# Patient Record
Sex: FEMALE | Race: WHITE | NOT HISPANIC OR LATINO | URBAN - METROPOLITAN AREA
[De-identification: names, ages, dates, MRNs, and addresses within clinical notes are randomized per-mention and may not be internally consistent; named-entity substitution may affect disease eponyms.]

---

## 2017-05-01 ENCOUNTER — OFFICE (AMBULATORY)
Dept: URBAN - METROPOLITAN AREA CLINIC 64 | Facility: CLINIC | Age: 67
End: 2017-05-01
Payer: COMMERCIAL

## 2017-05-01 ENCOUNTER — HOSPITAL ENCOUNTER (OUTPATIENT)
Dept: OTHER | Facility: HOSPITAL | Age: 67
Setting detail: SPECIMEN
Discharge: HOME | End: 2017-05-01
Attending: INTERNAL MEDICINE | Admitting: INTERNAL MEDICINE

## 2017-05-01 ENCOUNTER — ON CAMPUS - OUTPATIENT (AMBULATORY)
Dept: URBAN - METROPOLITAN AREA HOSPITAL 2 | Facility: HOSPITAL | Age: 67
End: 2017-05-01
Payer: COMMERCIAL

## 2017-05-01 VITALS
HEART RATE: 81 BPM | DIASTOLIC BLOOD PRESSURE: 83 MMHG | SYSTOLIC BLOOD PRESSURE: 136 MMHG | SYSTOLIC BLOOD PRESSURE: 140 MMHG | SYSTOLIC BLOOD PRESSURE: 112 MMHG | DIASTOLIC BLOOD PRESSURE: 72 MMHG | TEMPERATURE: 97.5 F | WEIGHT: 125 LBS | SYSTOLIC BLOOD PRESSURE: 116 MMHG | DIASTOLIC BLOOD PRESSURE: 63 MMHG | RESPIRATION RATE: 16 BRPM | HEART RATE: 85 BPM | SYSTOLIC BLOOD PRESSURE: 138 MMHG | DIASTOLIC BLOOD PRESSURE: 59 MMHG | HEIGHT: 63 IN | SYSTOLIC BLOOD PRESSURE: 113 MMHG | DIASTOLIC BLOOD PRESSURE: 71 MMHG | OXYGEN SATURATION: 100 % | OXYGEN SATURATION: 97 % | HEART RATE: 84 BPM | OXYGEN SATURATION: 96 % | HEART RATE: 80 BPM | OXYGEN SATURATION: 98 % | HEART RATE: 87 BPM | HEART RATE: 79 BPM | HEART RATE: 76 BPM | HEART RATE: 82 BPM | SYSTOLIC BLOOD PRESSURE: 91 MMHG | SYSTOLIC BLOOD PRESSURE: 115 MMHG | DIASTOLIC BLOOD PRESSURE: 55 MMHG | OXYGEN SATURATION: 99 % | DIASTOLIC BLOOD PRESSURE: 53 MMHG

## 2017-05-01 DIAGNOSIS — Z12.11 ENCOUNTER FOR SCREENING FOR MALIGNANT NEOPLASM OF COLON: ICD-10-CM

## 2017-05-01 DIAGNOSIS — D12.2 BENIGN NEOPLASM OF ASCENDING COLON: ICD-10-CM

## 2017-05-01 LAB
GI HISTOLOGY: A. UNSPECIFIED: (no result)
GI HISTOLOGY: PDF REPORT: (no result)

## 2017-05-01 PROCEDURE — 88305 TISSUE EXAM BY PATHOLOGIST: CPT | Mod: 26 | Performed by: INTERNAL MEDICINE

## 2017-05-01 PROCEDURE — 45380 COLONOSCOPY AND BIOPSY: CPT | Performed by: INTERNAL MEDICINE

## 2017-05-01 RX ADMIN — PROPOFOL: 10 INJECTION, EMULSION INTRAVENOUS at 16:02

## 2017-07-20 ENCOUNTER — HOSPITAL ENCOUNTER (OUTPATIENT)
Dept: FAMILY MEDICINE CLINIC | Facility: CLINIC | Age: 67
Setting detail: SPECIMEN
Discharge: HOME | End: 2017-07-20
Attending: INTERNAL MEDICINE | Admitting: INTERNAL MEDICINE

## 2017-07-20 LAB
BACTERIA SPEC AEROBE CULT: NORMAL
Lab: NORMAL
MICRO REPORT STATUS: NORMAL
SPECIMEN SOURCE: NORMAL

## 2017-11-20 ENCOUNTER — HOSPITAL ENCOUNTER (OUTPATIENT)
Dept: FAMILY MEDICINE CLINIC | Facility: CLINIC | Age: 67
Setting detail: SPECIMEN
Discharge: HOME | End: 2017-11-20
Attending: FAMILY MEDICINE | Admitting: FAMILY MEDICINE

## 2017-11-20 LAB
ALBUMIN SERPL-MCNC: 4.2 G/DL (ref 3.5–4.8)
ALBUMIN/GLOB SERPL: 1.6 {RATIO} (ref 1–1.7)
ALP SERPL-CCNC: 40 IU/L (ref 32–91)
ALT SERPL-CCNC: 27 IU/L (ref 14–54)
ANION GAP SERPL CALC-SCNC: 9.9 MMOL/L (ref 10–20)
AST SERPL-CCNC: 25 IU/L (ref 15–41)
BASOPHILS # BLD AUTO: 0.1 10*3/UL (ref 0–0.2)
BASOPHILS NFR BLD AUTO: 1 % (ref 0–2)
BILIRUB SERPL-MCNC: 0.6 MG/DL (ref 0.3–1.2)
BILIRUB UR QL STRIP: NEGATIVE MG/DL
BUN SERPL-MCNC: 15 MG/DL (ref 8–20)
BUN/CREAT SERPL: 25 (ref 5.4–26.2)
CALCIUM SERPL-MCNC: 9.2 MG/DL (ref 8.9–10.3)
CASTS URNS QL MICRO: ABNORMAL /[LPF]
CHLORIDE SERPL-SCNC: 101 MMOL/L (ref 101–111)
CHOLEST SERPL-MCNC: 158 MG/DL
CHOLEST/HDLC SERPL: 2.7 {RATIO}
COLOR UR: YELLOW
CONV BACTERIA IN URINE MICRO: NEGATIVE
CONV CLARITY OF URINE: CLEAR
CONV CO2: 28 MMOL/L (ref 22–32)
CONV HYALINE CASTS IN URINE MICRO: 0 /[LPF] (ref 0–5)
CONV LDL CHOLESTEROL DIRECT: 85 MG/DL (ref 0–100)
CONV PROTEIN IN URINE BY AUTOMATED TEST STRIP: NEGATIVE MG/DL
CONV SMALL ROUND CELLS: ABNORMAL /[HPF]
CONV TOTAL PROTEIN: 6.8 G/DL (ref 6.1–7.9)
CONV UROBILINOGEN IN URINE BY AUTOMATED TEST STRIP: 0.2 MG/DL
CREAT UR-MCNC: 0.6 MG/DL (ref 0.4–1)
CULTURE INDICATED?: ABNORMAL
DIFFERENTIAL METHOD BLD: (no result)
EOSINOPHIL # BLD AUTO: 0.2 10*3/UL (ref 0–0.3)
EOSINOPHIL # BLD AUTO: 3 % (ref 0–3)
ERYTHROCYTE [DISTWIDTH] IN BLOOD BY AUTOMATED COUNT: 14.4 % (ref 11.5–14.5)
GLOBULIN UR ELPH-MCNC: 2.6 G/DL (ref 2.5–3.8)
GLUCOSE SERPL-MCNC: 130 MG/DL (ref 65–99)
GLUCOSE UR QL: NEGATIVE MG/DL
HCT VFR BLD AUTO: 38 % (ref 35–49)
HDLC SERPL-MCNC: 59 MG/DL
HGB BLD-MCNC: 12.4 G/DL (ref 12–15)
HGB UR QL STRIP: NEGATIVE
KETONES UR QL STRIP: NEGATIVE MG/DL
LDLC/HDLC SERPL: 1.4 {RATIO}
LEUKOCYTE ESTERASE UR QL STRIP: ABNORMAL
LIPID INTERPRETATION: NORMAL
LYMPHOCYTES # BLD AUTO: 2.8 10*3/UL (ref 0.8–4.8)
LYMPHOCYTES NFR BLD AUTO: 37 % (ref 18–42)
MCH RBC QN AUTO: 28.1 PG (ref 26–32)
MCHC RBC AUTO-ENTMCNC: 32.6 G/DL (ref 32–36)
MCV RBC AUTO: 86.1 FL (ref 80–94)
MONOCYTES # BLD AUTO: 0.7 10*3/UL (ref 0.1–1.3)
MONOCYTES NFR BLD AUTO: 10 % (ref 2–11)
NEUTROPHILS # BLD AUTO: 3.6 10*3/UL (ref 2.3–8.6)
NEUTROPHILS NFR BLD AUTO: 49 % (ref 50–75)
NITRITE UR QL STRIP: NEGATIVE
NRBC BLD AUTO-RTO: 0 /100{WBCS}
NRBC/RBC NFR BLD MANUAL: 0 10*3/UL
PH UR STRIP.AUTO: 6.5 [PH] (ref 4.5–8)
PLATELET # BLD AUTO: 291 10*3/UL (ref 150–450)
PMV BLD AUTO: 9 FL (ref 7.4–10.4)
POTASSIUM SERPL-SCNC: 3.9 MMOL/L (ref 3.6–5.1)
RBC # BLD AUTO: 4.42 10*6/UL (ref 4–5.4)
RBC #/AREA URNS HPF: 2 /[HPF] (ref 0–3)
SODIUM SERPL-SCNC: 135 MMOL/L (ref 136–144)
SP GR UR: 1.01 (ref 1–1.03)
SPERM URNS QL MICRO: ABNORMAL /[HPF]
SQUAMOUS SPT QL MICRO: 0 /[HPF] (ref 0–5)
T4 FREE SERPL-MCNC: 1.07 NG/DL (ref 0.58–1.64)
TRIGL SERPL-MCNC: 115 MG/DL
TSH SERPL-ACNC: 1.52 UIU/ML (ref 0.34–5.6)
UNIDENT CRYS URNS QL MICRO: ABNORMAL /[HPF]
VIT B12 SERPL-MCNC: 135 PG/ML (ref 180–914)
VLDLC SERPL CALC-MCNC: 13.3 MG/DL
WBC # BLD AUTO: 7.4 10*3/UL (ref 4.5–11.5)
WBC #/AREA URNS HPF: 1 /[HPF] (ref 0–5)
YEAST SPEC QL WET PREP: ABNORMAL /[HPF]

## 2018-03-19 ENCOUNTER — HOSPITAL ENCOUNTER (OUTPATIENT)
Dept: FAMILY MEDICINE CLINIC | Facility: CLINIC | Age: 68
Setting detail: SPECIMEN
Discharge: HOME | End: 2018-03-19
Attending: FAMILY MEDICINE | Admitting: FAMILY MEDICINE

## 2018-08-07 ENCOUNTER — HOSPITAL ENCOUNTER (OUTPATIENT)
Dept: FAMILY MEDICINE CLINIC | Facility: CLINIC | Age: 68
Setting detail: SPECIMEN
Discharge: HOME | End: 2018-08-07
Attending: FAMILY MEDICINE | Admitting: FAMILY MEDICINE

## 2018-08-07 LAB
BASOPHILS # BLD AUTO: 0 10*3/UL (ref 0–0.2)
BASOPHILS NFR BLD AUTO: 0 % (ref 0–2)
BILIRUB UR QL STRIP: NEGATIVE MG/DL
CASTS URNS QL MICRO: ABNORMAL /[LPF]
COLOR UR: YELLOW
CONV BACTERIA IN URINE MICRO: NEGATIVE
CONV CLARITY OF URINE: CLEAR
CONV HYALINE CASTS IN URINE MICRO: 0 /[LPF] (ref 0–5)
CONV PROTEIN IN URINE BY AUTOMATED TEST STRIP: NEGATIVE MG/DL
CONV SMALL ROUND CELLS: ABNORMAL /[HPF]
CONV UROBILINOGEN IN URINE BY AUTOMATED TEST STRIP: 0.2 MG/DL
CULTURE INDICATED?: ABNORMAL
DIFFERENTIAL METHOD BLD: (no result)
EOSINOPHIL # BLD AUTO: 0.1 10*3/UL (ref 0–0.3)
EOSINOPHIL # BLD AUTO: 1 % (ref 0–3)
ERYTHROCYTE [DISTWIDTH] IN BLOOD BY AUTOMATED COUNT: 14.3 % (ref 11.5–14.5)
GLUCOSE UR QL: >1000 MG/DL
HCT VFR BLD AUTO: 43.7 % (ref 35–49)
HGB BLD-MCNC: 14.1 G/DL (ref 12–15)
HGB UR QL STRIP: NEGATIVE
KETONES UR QL STRIP: NEGATIVE MG/DL
LEUKOCYTE ESTERASE UR QL STRIP: NEGATIVE
LYMPHOCYTES # BLD AUTO: 2.8 10*3/UL (ref 0.8–4.8)
LYMPHOCYTES NFR BLD AUTO: 34 % (ref 18–42)
MCH RBC QN AUTO: 27.5 PG (ref 26–32)
MCHC RBC AUTO-ENTMCNC: 32.2 G/DL (ref 32–36)
MCV RBC AUTO: 85.4 FL (ref 80–94)
MONOCYTES # BLD AUTO: 0.8 10*3/UL (ref 0.1–1.3)
MONOCYTES NFR BLD AUTO: 10 % (ref 2–11)
NEUTROPHILS # BLD AUTO: 4.6 10*3/UL (ref 2.3–8.6)
NEUTROPHILS NFR BLD AUTO: 55 % (ref 50–75)
NITRITE UR QL STRIP: NEGATIVE
NRBC BLD AUTO-RTO: 0 /100{WBCS}
NRBC/RBC NFR BLD MANUAL: 0 10*3/UL
PH UR STRIP.AUTO: 6 [PH] (ref 4.5–8)
PLATELET # BLD AUTO: 346 10*3/UL (ref 150–450)
PMV BLD AUTO: 9.2 FL (ref 7.4–10.4)
RBC # BLD AUTO: 5.11 10*6/UL (ref 4–5.4)
RBC #/AREA URNS HPF: 2 /[HPF] (ref 0–3)
SP GR UR: 1.03 (ref 1–1.03)
SPERM URNS QL MICRO: ABNORMAL /[HPF]
SQUAMOUS SPT QL MICRO: 1 /[HPF] (ref 0–5)
UNIDENT CRYS URNS QL MICRO: ABNORMAL /[HPF]
WBC # BLD AUTO: 8.4 10*3/UL (ref 4.5–11.5)
WBC #/AREA URNS HPF: 2 /[HPF] (ref 0–5)
YEAST SPEC QL WET PREP: ABNORMAL /[HPF]

## 2018-08-09 ENCOUNTER — HOSPITAL ENCOUNTER (OUTPATIENT)
Dept: CT IMAGING | Facility: HOSPITAL | Age: 68
Discharge: HOME | End: 2018-08-09
Attending: FAMILY MEDICINE | Admitting: FAMILY MEDICINE

## 2018-08-09 LAB — CREAT BLDA-MCNC: 0.7 MG/DL (ref 0.6–1.3)

## 2018-09-18 ENCOUNTER — HOSPITAL ENCOUNTER (OUTPATIENT)
Dept: FAMILY MEDICINE CLINIC | Facility: CLINIC | Age: 68
Setting detail: SPECIMEN
Discharge: HOME | End: 2018-09-18
Attending: FAMILY MEDICINE | Admitting: FAMILY MEDICINE

## 2019-01-14 ENCOUNTER — HOSPITAL ENCOUNTER (OUTPATIENT)
Dept: FAMILY MEDICINE CLINIC | Facility: CLINIC | Age: 69
Setting detail: SPECIMEN
Discharge: HOME | End: 2019-01-14
Attending: FAMILY MEDICINE | Admitting: FAMILY MEDICINE

## 2019-01-14 LAB
ALBUMIN SERPL-MCNC: 3.8 G/DL (ref 3.5–4.8)
ALBUMIN/GLOB SERPL: 1.4 {RATIO} (ref 1–1.7)
ALP SERPL-CCNC: 37 IU/L (ref 32–91)
ALT SERPL-CCNC: 16 IU/L (ref 14–54)
ANION GAP SERPL CALC-SCNC: 13.9 MMOL/L (ref 10–20)
AST SERPL-CCNC: 23 IU/L (ref 15–41)
BASOPHILS # BLD AUTO: 0.1 10*3/UL (ref 0–0.2)
BASOPHILS NFR BLD AUTO: 1 % (ref 0–2)
BILIRUB SERPL-MCNC: 0.6 MG/DL (ref 0.3–1.2)
BILIRUB UR QL STRIP: NEGATIVE MG/DL
BUN SERPL-MCNC: 15 MG/DL (ref 8–20)
BUN/CREAT SERPL: 21.4 (ref 5.4–26.2)
CALCIUM SERPL-MCNC: 8.6 MG/DL (ref 8.9–10.3)
CASTS URNS QL MICRO: ABNORMAL /[LPF]
CHLORIDE SERPL-SCNC: 102 MMOL/L (ref 101–111)
CHOLEST SERPL-MCNC: 163 MG/DL
CHOLEST/HDLC SERPL: 2.5 {RATIO}
COLOR UR: YELLOW
CONV BACTERIA IN URINE MICRO: NEGATIVE
CONV CLARITY OF URINE: ABNORMAL
CONV CO2: 25 MMOL/L (ref 22–32)
CONV HYALINE CASTS IN URINE MICRO: 2 /[LPF] (ref 0–5)
CONV LDL CHOLESTEROL DIRECT: 85 MG/DL (ref 0–100)
CONV PROTEIN IN URINE BY AUTOMATED TEST STRIP: NEGATIVE MG/DL
CONV SMALL ROUND CELLS: ABNORMAL /[HPF]
CONV TOTAL PROTEIN: 6.6 G/DL (ref 6.1–7.9)
CONV UROBILINOGEN IN URINE BY AUTOMATED TEST STRIP: 0.2 MG/DL
CREAT UR-MCNC: 0.7 MG/DL (ref 0.4–1)
CULTURE INDICATED?: ABNORMAL
DIFFERENTIAL METHOD BLD: (no result)
EOSINOPHIL # BLD AUTO: 0.1 10*3/UL (ref 0–0.3)
EOSINOPHIL # BLD AUTO: 2 % (ref 0–3)
ERYTHROCYTE [DISTWIDTH] IN BLOOD BY AUTOMATED COUNT: 15.2 % (ref 11.5–14.5)
GLOBULIN UR ELPH-MCNC: 2.8 G/DL (ref 2.5–3.8)
GLUCOSE SERPL-MCNC: 111 MG/DL (ref 65–99)
GLUCOSE UR QL: >1000 MG/DL
HCT VFR BLD AUTO: 39.9 % (ref 35–49)
HDLC SERPL-MCNC: 66 MG/DL
HGB BLD-MCNC: 12.8 G/DL (ref 12–15)
HGB UR QL STRIP: ABNORMAL
KETONES UR QL STRIP: ABNORMAL MG/DL
LDLC/HDLC SERPL: 1.3 {RATIO}
LEUKOCYTE ESTERASE UR QL STRIP: NEGATIVE
LIPID INTERPRETATION: NORMAL
LYMPHOCYTES # BLD AUTO: 2.5 10*3/UL (ref 0.8–4.8)
LYMPHOCYTES NFR BLD AUTO: 36 % (ref 18–42)
MCH RBC QN AUTO: 28.1 PG (ref 26–32)
MCHC RBC AUTO-ENTMCNC: 32.1 G/DL (ref 32–36)
MCV RBC AUTO: 87.4 FL (ref 80–94)
MONOCYTES # BLD AUTO: 0.6 10*3/UL (ref 0.1–1.3)
MONOCYTES NFR BLD AUTO: 9 % (ref 2–11)
NEUTROPHILS # BLD AUTO: 3.8 10*3/UL (ref 2.3–8.6)
NEUTROPHILS NFR BLD AUTO: 52 % (ref 50–75)
NITRITE UR QL STRIP: NEGATIVE
NRBC BLD AUTO-RTO: 0 /100{WBCS}
NRBC/RBC NFR BLD MANUAL: 0 10*3/UL
PH UR STRIP.AUTO: 5.5 [PH] (ref 4.5–8)
PLATELET # BLD AUTO: 329 10*3/UL (ref 150–450)
PMV BLD AUTO: 9.1 FL (ref 7.4–10.4)
POTASSIUM SERPL-SCNC: 3.9 MMOL/L (ref 3.6–5.1)
RBC # BLD AUTO: 4.57 10*6/UL (ref 4–5.4)
RBC #/AREA URNS HPF: 4 /[HPF] (ref 0–3)
SODIUM SERPL-SCNC: 137 MMOL/L (ref 136–144)
SP GR UR: 1.03 (ref 1–1.03)
SPERM URNS QL MICRO: ABNORMAL /[HPF]
SQUAMOUS SPT QL MICRO: 1 /[HPF] (ref 0–5)
TRIGL SERPL-MCNC: 92 MG/DL
UNIDENT CRYS URNS QL MICRO: ABNORMAL /[HPF]
VLDLC SERPL CALC-MCNC: 12.4 MG/DL
WBC # BLD AUTO: 7.1 10*3/UL (ref 4.5–11.5)
WBC #/AREA URNS HPF: 1 /[HPF] (ref 0–5)
YEAST SPEC QL WET PREP: ABNORMAL /[HPF]

## 2019-06-20 ENCOUNTER — TELEPHONE (OUTPATIENT)
Dept: FAMILY MEDICINE CLINIC | Facility: CLINIC | Age: 69
End: 2019-06-20

## 2019-06-28 RX ORDER — BLOOD SUGAR DIAGNOSTIC, DRUM
STRIP MISCELLANEOUS
Qty: 200 EACH | Refills: 2 | Status: SHIPPED | OUTPATIENT
Start: 2019-06-28 | End: 2020-04-23 | Stop reason: RX

## 2019-07-11 DIAGNOSIS — E11.8 TYPE 2 DIABETES MELLITUS WITH COMPLICATION, UNSPECIFIED WHETHER LONG TERM INSULIN USE: Primary | ICD-10-CM

## 2019-07-15 ENCOUNTER — LAB (OUTPATIENT)
Dept: FAMILY MEDICINE CLINIC | Facility: CLINIC | Age: 69
End: 2019-07-15

## 2019-07-15 DIAGNOSIS — E11.8 TYPE 2 DIABETES MELLITUS WITH COMPLICATION, UNSPECIFIED WHETHER LONG TERM INSULIN USE: ICD-10-CM

## 2019-07-15 LAB
ALBUMIN SERPL-MCNC: 4.1 G/DL (ref 3.5–4.8)
ALBUMIN/GLOB SERPL: 1.4 G/DL (ref 1–1.7)
ALP SERPL-CCNC: 35 U/L (ref 32–91)
ALT SERPL W P-5'-P-CCNC: 16 U/L (ref 14–54)
ANION GAP SERPL CALCULATED.3IONS-SCNC: 17.5 MMOL/L (ref 5–15)
AST SERPL-CCNC: 21 U/L (ref 15–41)
BILIRUB SERPL-MCNC: 0.5 MG/DL (ref 0.3–1.2)
BUN BLD-MCNC: 15 MG/DL (ref 8–20)
BUN/CREAT SERPL: 21.4 (ref 5.4–26.2)
CALCIUM SPEC-SCNC: 9.3 MG/DL (ref 8.9–10.3)
CHLORIDE SERPL-SCNC: 100 MMOL/L (ref 101–111)
CO2 SERPL-SCNC: 24 MMOL/L (ref 22–32)
CREAT BLD-MCNC: 0.7 MG/DL (ref 0.4–1)
GFR SERPL CREATININE-BSD FRML MDRD: 83 ML/MIN/1.73
GLOBULIN UR ELPH-MCNC: 2.9 GM/DL (ref 2.5–3.8)
GLUCOSE BLD-MCNC: 94 MG/DL (ref 65–99)
HBA1C MFR BLD: 7.3 % (ref 3.5–5.6)
POTASSIUM BLD-SCNC: 4.5 MMOL/L (ref 3.6–5.1)
PROT SERPL-MCNC: 7 G/DL (ref 6.1–7.9)
SODIUM BLD-SCNC: 137 MMOL/L (ref 136–144)

## 2019-07-15 PROCEDURE — 83036 HEMOGLOBIN GLYCOSYLATED A1C: CPT | Performed by: FAMILY MEDICINE

## 2019-07-15 PROCEDURE — 80053 COMPREHEN METABOLIC PANEL: CPT | Performed by: FAMILY MEDICINE

## 2019-07-17 ENCOUNTER — TELEPHONE (OUTPATIENT)
Dept: FAMILY MEDICINE CLINIC | Facility: CLINIC | Age: 69
End: 2019-07-17

## 2019-07-22 ENCOUNTER — OFFICE VISIT (OUTPATIENT)
Dept: FAMILY MEDICINE CLINIC | Facility: CLINIC | Age: 69
End: 2019-07-22

## 2019-07-22 VITALS
TEMPERATURE: 98.3 F | DIASTOLIC BLOOD PRESSURE: 78 MMHG | BODY MASS INDEX: 21.35 KG/M2 | SYSTOLIC BLOOD PRESSURE: 122 MMHG | RESPIRATION RATE: 16 BRPM | HEIGHT: 62 IN | HEART RATE: 80 BPM | WEIGHT: 116 LBS

## 2019-07-22 DIAGNOSIS — E11.9 TYPE 2 DIABETES MELLITUS WITHOUT COMPLICATION, WITHOUT LONG-TERM CURRENT USE OF INSULIN (HCC): Primary | ICD-10-CM

## 2019-07-22 DIAGNOSIS — J30.89 ENVIRONMENTAL AND SEASONAL ALLERGIES: ICD-10-CM

## 2019-07-22 PROBLEM — E53.9 VITAMIN B DEFICIENCY: Status: ACTIVE | Noted: 2017-11-27

## 2019-07-22 PROBLEM — M19.90 OSTEOARTHRITIS: Status: ACTIVE | Noted: 2019-07-22

## 2019-07-22 PROCEDURE — 99213 OFFICE O/P EST LOW 20 MIN: CPT | Performed by: FAMILY MEDICINE

## 2019-07-22 RX ORDER — GLIPIZIDE 10 MG/1
TABLET ORAL
COMMUNITY
Start: 2019-06-09 | End: 2019-11-29 | Stop reason: SDUPTHER

## 2019-07-22 RX ORDER — LORATADINE AND PSEUDOEPHEDRINE SULFATE 5; 120 MG/1; MG/1
1 TABLET, EXTENDED RELEASE ORAL 2 TIMES DAILY
COMMUNITY
End: 2019-07-22 | Stop reason: SDUPTHER

## 2019-07-22 RX ORDER — AZELASTINE HYDROCHLORIDE 0.5 MG/ML
SOLUTION/ DROPS OPHTHALMIC
COMMUNITY
Start: 2019-07-03 | End: 2023-02-23

## 2019-07-22 RX ORDER — PRAVASTATIN SODIUM 20 MG
TABLET ORAL
COMMUNITY
Start: 2019-06-09 | End: 2019-11-29 | Stop reason: SDUPTHER

## 2019-07-22 RX ORDER — DAPAGLIFLOZIN 5 MG/1
TABLET, FILM COATED ORAL
COMMUNITY
Start: 2019-06-09 | End: 2020-12-08 | Stop reason: SDUPTHER

## 2019-07-22 RX ORDER — LORATADINE AND PSEUDOEPHEDRINE SULFATE 5; 120 MG/1; MG/1
1 TABLET, EXTENDED RELEASE ORAL 2 TIMES DAILY
Qty: 60 TABLET | Refills: 3 | Status: SHIPPED | OUTPATIENT
Start: 2019-07-22 | End: 2020-12-08

## 2019-07-22 RX ORDER — LISINOPRIL 10 MG/1
TABLET ORAL
COMMUNITY
Start: 2019-05-06 | End: 2019-11-02 | Stop reason: SDUPTHER

## 2019-07-22 NOTE — ASSESSMENT & PLAN NOTE
Diabetes is improving with treatment.   Continue current treatment regimen.  Reminded to bring in blood sugar diary at next visit.  Dietary recommendations for ADA diet.  Regular aerobic exercise.  Discussed ways to avoid symptomatic hypoglycemia.  Diabetes will be reassessed in 6 months.      Overall the patient is doing reasonably well.  Her A1c is close to 7% 9 think for her age and her situation we should probably except that is reasonable.  For her to go lower would take insulin and then I think we would have the risk of hypoglycemia.  She stays very active and if we overtreated with insulin she would almost certainly get too low at times.  Because she is up and active and works a full-time job I think we should except 7% as a reasonable goal for her diabetes control.  She feels well and has really no evidence of endorgan injury.

## 2019-07-22 NOTE — PROGRESS NOTES
Rooming Tab(CC,VS,Pt Hx,Fall Screen)  Chief Complaint   Patient presents with   • Hyperlipidemia   • Hypertension   • Diabetes     6 mo f/u       Subjective    Patient is here for checkup on diabetes.   She is on Farxiga, glipizide, victoza and metformin.      A1c is 7.3%  She is really doing all she can as far as diet and exercise.  She feels fine.  She is active and her weight is stable.  She does the best she can with her diet and she is staying very busy with work.  I think we can except an A1c around 7% for her and not pusher to lower levels.  It would take insulin to do that and I just do not think she wants to do that right now    We also discussed her allergies.  She is having some seasonal flareups right now because of the weather.  Over-the-counter Claritin does seem to control her most of the time though and she really is not interested in further evaluation or treatment.  She is not having any difficulty at night resting.    I have reviewed and updated her medications, medical history and problem list during today's office visit.     Patient Care Team:  Donnie Ng MD as PCP - General  oDnnie Ng MD as PCP - Claims Attributed    Problem List Tab  Medications Tab  Synopsis Tab  Chart Review Tab  Care Everywhere Tab  Immunizations Tab  Patient History Tab    Social History     Tobacco Use   • Smoking status: Never Smoker   Substance Use Topics   • Alcohol use: No     Frequency: Never       Review of Systems   Constitutional: Negative.  Negative for appetite change, diaphoresis, fatigue, fever and unexpected weight loss.   HENT: Negative for congestion, sinus pressure and sore throat.    Eyes: Negative for blurred vision, double vision and itching.   Respiratory: Negative for cough and shortness of breath.    Cardiovascular: Negative for chest pain and palpitations.   Genitourinary: Negative for urinary incontinence, difficulty urinating and frequency.   Musculoskeletal: Negative for  "arthralgias, back pain, joint swelling, myalgias and neck pain.   Skin: Negative for dry skin and rash.   Allergic/Immunologic: Negative for environmental allergies.   Neurological: Negative for dizziness, tremors, syncope, headache and memory problem.   Hematological: Does not bruise/bleed easily.   Psychiatric/Behavioral: Negative for depressed mood. The patient is not nervous/anxious.    All other systems reviewed and are negative.      Objective     Rooming Tab(CC,VS,Pt Hx,Fall Screen)  /78 (BP Location: Left arm, Cuff Size: Adult)   Pulse 80   Temp 98.3 °F (36.8 °C) (Oral)   Resp 16   Ht 157.5 cm (62\")   Wt 52.6 kg (116 lb)   BMI 21.22 kg/m²     Body mass index is 21.22 kg/m².    Physical Exam   Constitutional: She is oriented to person, place, and time. She appears well-developed and well-nourished.   HENT:   Head: Normocephalic and atraumatic.   Right Ear: External ear normal.   Left Ear: External ear normal.   Nose: Nose normal.   Mouth/Throat: Oropharynx is clear and moist.   Eyes: Conjunctivae and EOM are normal. Pupils are equal, round, and reactive to light.   Neck: Normal range of motion. Neck supple.   Cardiovascular: Normal rate, regular rhythm, normal heart sounds and intact distal pulses.   Pulmonary/Chest: Effort normal and breath sounds normal.   Abdominal: Soft. Bowel sounds are normal.   Musculoskeletal: Normal range of motion.   Neurological: She is alert and oriented to person, place, and time.   Skin: Skin is warm and dry.   Psychiatric: She has a normal mood and affect. Her behavior is normal. Judgment and thought content normal.   Nursing note and vitals reviewed.       Statin Choice Calculator  Data Reviewed:               Lab Results   Component Value Date    BUN 15 07/15/2019    CREATININE 0.70 07/15/2019    EGFRIFNONA 83 07/15/2019     07/15/2019    K 4.5 07/15/2019     (L) 07/15/2019    CALCIUM 9.3 07/15/2019    ALBUMIN 4.10 07/15/2019    BILITOT 0.5 07/15/2019 "    ALKPHOS 35 07/15/2019    AST 21 07/15/2019    ALT 16 07/15/2019      Assessment/Plan   Order Review Tab  Health Maintenance Tab  Patient Plan/Order Tab  Diagnoses and all orders for this visit:    1. Type 2 diabetes mellitus without complication, without long-term current use of insulin (CMS/[facility]) (Primary)  Assessment & Plan:  Diabetes is improving with treatment.   Continue current treatment regimen.  Reminded to bring in blood sugar diary at next visit.  Dietary recommendations for ADA diet.  Regular aerobic exercise.  Discussed ways to avoid symptomatic hypoglycemia.  Diabetes will be reassessed in 6 months.      Overall the patient is doing reasonably well.  Her A1c is close to 7% 9 think for her age and her situation we should probably except that is reasonable.  For her to go lower would take insulin and then I think we would have the risk of hypoglycemia.  She stays very active and if we overtreated with insulin she would almost certainly get too low at times.  Because she is up and active and works a full-time job I think we should except 7% as a reasonable goal for her diabetes control.  She feels well and has really no evidence of endorgan injury.        2. Environmental and seasonal allergies  Assessment & Plan:  She does have seasonal allergies but they seem to be reasonably well controlled with over-the-counter medications.  She has no interest in being tested or desensitized.  Her symptoms are not that bad.  She will  Just  continue over-the-counter medicines      Other orders  -     CLARITIN-D 12 HOUR 5-120 MG per 12 hr tablet; Take 1 tablet by mouth 2 (Two) Times a Day.  Dispense: 60 tablet; Refill: 3      Wrapup Tab  Return in about 4 months (around 11/22/2019) for Medicare Wellness.

## 2019-07-23 PROBLEM — J30.89 ENVIRONMENTAL AND SEASONAL ALLERGIES: Status: ACTIVE | Noted: 2019-07-23

## 2019-07-23 NOTE — ASSESSMENT & PLAN NOTE
She does have seasonal allergies but they seem to be reasonably well controlled with over-the-counter medications.  She has no interest in being tested or desensitized.  Her symptoms are not that bad.  She will  Just  continue over-the-counter medicines

## 2019-08-15 ENCOUNTER — OFFICE VISIT (OUTPATIENT)
Dept: FAMILY MEDICINE CLINIC | Facility: CLINIC | Age: 69
End: 2019-08-15

## 2019-08-15 VITALS
HEIGHT: 62 IN | HEART RATE: 80 BPM | WEIGHT: 115 LBS | RESPIRATION RATE: 16 BRPM | SYSTOLIC BLOOD PRESSURE: 118 MMHG | BODY MASS INDEX: 21.16 KG/M2 | TEMPERATURE: 98.2 F | DIASTOLIC BLOOD PRESSURE: 70 MMHG

## 2019-08-15 DIAGNOSIS — J02.9 ACUTE PHARYNGITIS, UNSPECIFIED ETIOLOGY: Primary | ICD-10-CM

## 2019-08-15 LAB
EXPIRATION DATE: NORMAL
INTERNAL CONTROL: NORMAL
Lab: NORMAL
S PYO AG THROAT QL: NEGATIVE

## 2019-08-15 PROCEDURE — 99213 OFFICE O/P EST LOW 20 MIN: CPT | Performed by: NURSE PRACTITIONER

## 2019-08-15 PROCEDURE — 87880 STREP A ASSAY W/OPTIC: CPT | Performed by: NURSE PRACTITIONER

## 2019-08-15 NOTE — PROGRESS NOTES
"Subjective   Janna Flowers is a 69 y.o. female.     Chief Complaint   Patient presents with   • Sore Throat       /70 (BP Location: Right arm, Patient Position: Sitting, Cuff Size: Adult)   Pulse 80   Temp 98.2 °F (36.8 °C) (Oral)   Resp 16   Ht 157.5 cm (62\")   Wt 52.2 kg (115 lb)   BMI 21.03 kg/m²     BP Readings from Last 3 Encounters:   08/15/19 118/70   07/22/19 122/78   03/14/19 132/64       Wt Readings from Last 3 Encounters:   08/15/19 52.2 kg (115 lb)   07/22/19 52.6 kg (116 lb)   03/14/19 (!) 508 kg (1118 lb 15.8 oz)       Pt comes in today with c/o sore throat that started on Monday. Woke up with scratchy throat, sneezing, drainage, and cough.   No fever or chills.   Cough is productive at times, but not necessary new. Struggles with allergies.   Taking claritin D and nasocort daily.   Not taking anything else OTC.   Throat is a little better today.   Had some pain last night when swallowing.          The following portions of the patient's history were reviewed and updated as appropriate: allergies, current medications, past family history, past medical history, past social history, past surgical history and problem list.    Review of Systems   Constitutional: Negative for chills and fever.   HENT: Positive for congestion, postnasal drip, rhinorrhea, sinus pressure, sneezing and sore throat. Negative for ear pain and swollen glands.    Respiratory: Positive for cough. Negative for chest tightness, shortness of breath and wheezing.    Gastrointestinal: Negative for nausea and vomiting.   Neurological: Positive for headache.       Objective   Physical Exam   Constitutional: She is oriented to person, place, and time. She appears well-developed and well-nourished.   HENT:   Nose: Mucosal edema present.   Mouth/Throat: Posterior oropharyngeal erythema present. No oropharyngeal exudate.   Eyes: Pupils are equal, round, and reactive to light.   Cardiovascular: Normal rate and regular rhythm. "   Pulmonary/Chest: Effort normal and breath sounds normal. No respiratory distress. She has no wheezes.   Neurological: She is alert and oriented to person, place, and time.          Diagnoses and all orders for this visit:    1. Acute pharyngitis, unspecified etiology (Primary)  -     POCT rapid strep A        Return if symptoms worsen or fail to improve.   During this office visit, we discussed the pertinent aspects of the visit and treatment recommendations. Pt verbalizes understanding. Follow up was discussed. Patient was given the opportunity to ask questions and discuss other concerns.

## 2019-11-04 RX ORDER — LISINOPRIL 10 MG/1
TABLET ORAL
Qty: 90 TABLET | Refills: 1 | Status: SHIPPED | OUTPATIENT
Start: 2019-11-04 | End: 2020-05-04

## 2019-11-14 RX ORDER — LIRAGLUTIDE 6 MG/ML
INJECTION SUBCUTANEOUS
Qty: 1 PEN | Refills: 2 | Status: SHIPPED | OUTPATIENT
Start: 2019-11-14 | End: 2020-02-19 | Stop reason: SDUPTHER

## 2019-11-15 DIAGNOSIS — M19.90 OSTEOARTHRITIS, UNSPECIFIED OSTEOARTHRITIS TYPE, UNSPECIFIED SITE: ICD-10-CM

## 2019-11-15 DIAGNOSIS — I10 ESSENTIAL HYPERTENSION: ICD-10-CM

## 2019-11-15 DIAGNOSIS — E78.5 HYPERLIPIDEMIA, UNSPECIFIED HYPERLIPIDEMIA TYPE: ICD-10-CM

## 2019-11-15 DIAGNOSIS — E53.9 VITAMIN B DEFICIENCY: ICD-10-CM

## 2019-11-15 DIAGNOSIS — M85.80 OTHER SPECIFIED DISORDERS OF BONE DENSITY AND STRUCTURE, UNSPECIFIED SITE: ICD-10-CM

## 2019-11-15 DIAGNOSIS — E11.9 TYPE 2 DIABETES MELLITUS WITHOUT COMPLICATION, WITHOUT LONG-TERM CURRENT USE OF INSULIN (HCC): Primary | ICD-10-CM

## 2019-11-18 ENCOUNTER — LAB (OUTPATIENT)
Dept: FAMILY MEDICINE CLINIC | Facility: CLINIC | Age: 69
End: 2019-11-18

## 2019-11-18 DIAGNOSIS — E53.9 VITAMIN B DEFICIENCY: ICD-10-CM

## 2019-11-18 DIAGNOSIS — E78.5 HYPERLIPIDEMIA, UNSPECIFIED HYPERLIPIDEMIA TYPE: ICD-10-CM

## 2019-11-18 DIAGNOSIS — M85.80 OTHER SPECIFIED DISORDERS OF BONE DENSITY AND STRUCTURE, UNSPECIFIED SITE: ICD-10-CM

## 2019-11-18 DIAGNOSIS — M19.90 OSTEOARTHRITIS, UNSPECIFIED OSTEOARTHRITIS TYPE, UNSPECIFIED SITE: ICD-10-CM

## 2019-11-18 DIAGNOSIS — E11.9 TYPE 2 DIABETES MELLITUS WITHOUT COMPLICATION, WITHOUT LONG-TERM CURRENT USE OF INSULIN (HCC): ICD-10-CM

## 2019-11-18 DIAGNOSIS — I10 ESSENTIAL HYPERTENSION: ICD-10-CM

## 2019-11-18 LAB
25(OH)D3 SERPL-MCNC: 40.7 NG/ML (ref 30–100)
ALBUMIN SERPL-MCNC: 4.5 G/DL (ref 3.5–5.2)
ALBUMIN/GLOB SERPL: 1.5 G/DL
ALP SERPL-CCNC: 45 U/L (ref 39–117)
ALT SERPL W P-5'-P-CCNC: 13 U/L (ref 1–33)
ANION GAP SERPL CALCULATED.3IONS-SCNC: 18.1 MMOL/L (ref 5–15)
AST SERPL-CCNC: 17 U/L (ref 1–32)
BASOPHILS # BLD AUTO: 0.03 10*3/MM3 (ref 0–0.2)
BASOPHILS NFR BLD AUTO: 0.4 % (ref 0–1.5)
BILIRUB SERPL-MCNC: 0.4 MG/DL (ref 0.2–1.2)
BUN BLD-MCNC: 14 MG/DL (ref 8–23)
BUN/CREAT SERPL: 21.9 (ref 7–25)
CALCIUM SPEC-SCNC: 9.5 MG/DL (ref 8.6–10.5)
CHLORIDE SERPL-SCNC: 103 MMOL/L (ref 98–107)
CHOLEST SERPL-MCNC: 179 MG/DL (ref 0–200)
CO2 SERPL-SCNC: 26.9 MMOL/L (ref 22–29)
CREAT BLD-MCNC: 0.64 MG/DL (ref 0.57–1)
DEPRECATED RDW RBC AUTO: 41.2 FL (ref 37–54)
EOSINOPHIL # BLD AUTO: 0.13 10*3/MM3 (ref 0–0.4)
EOSINOPHIL NFR BLD AUTO: 1.7 % (ref 0.3–6.2)
ERYTHROCYTE [DISTWIDTH] IN BLOOD BY AUTOMATED COUNT: 13.2 % (ref 12.3–15.4)
GFR SERPL CREATININE-BSD FRML MDRD: 92 ML/MIN/1.73
GLOBULIN UR ELPH-MCNC: 3.1 GM/DL
GLUCOSE BLD-MCNC: 164 MG/DL (ref 65–99)
HBA1C MFR BLD: 7.4 % (ref 3.5–5.6)
HCT VFR BLD AUTO: 42.7 % (ref 34–46.6)
HDLC SERPL-MCNC: 74 MG/DL (ref 40–60)
HGB BLD-MCNC: 13.2 G/DL (ref 12–15.9)
IMM GRANULOCYTES # BLD AUTO: 0.02 10*3/MM3 (ref 0–0.05)
IMM GRANULOCYTES NFR BLD AUTO: 0.3 % (ref 0–0.5)
LDLC SERPL CALC-MCNC: 80 MG/DL (ref 0–100)
LDLC/HDLC SERPL: 1.09 {RATIO}
LYMPHOCYTES # BLD AUTO: 2.27 10*3/MM3 (ref 0.7–3.1)
LYMPHOCYTES NFR BLD AUTO: 29.9 % (ref 19.6–45.3)
MCH RBC QN AUTO: 26.6 PG (ref 26.6–33)
MCHC RBC AUTO-ENTMCNC: 30.9 G/DL (ref 31.5–35.7)
MCV RBC AUTO: 86.1 FL (ref 79–97)
MONOCYTES # BLD AUTO: 0.66 10*3/MM3 (ref 0.1–0.9)
MONOCYTES NFR BLD AUTO: 8.7 % (ref 5–12)
NEUTROPHILS # BLD AUTO: 4.48 10*3/MM3 (ref 1.7–7)
NEUTROPHILS NFR BLD AUTO: 59 % (ref 42.7–76)
NRBC BLD AUTO-RTO: 0 /100 WBC (ref 0–0.2)
PLATELET # BLD AUTO: 417 10*3/MM3 (ref 140–450)
PMV BLD AUTO: 10.8 FL (ref 6–12)
POTASSIUM BLD-SCNC: 4.5 MMOL/L (ref 3.5–5.2)
PROT SERPL-MCNC: 7.6 G/DL (ref 6–8.5)
RBC # BLD AUTO: 4.96 10*6/MM3 (ref 3.77–5.28)
SODIUM BLD-SCNC: 148 MMOL/L (ref 136–145)
T4 FREE SERPL-MCNC: 1.37 NG/DL (ref 0.93–1.7)
TRIGL SERPL-MCNC: 123 MG/DL (ref 0–150)
VIT B12 BLD-MCNC: 1110 PG/ML (ref 211–946)
VLDLC SERPL-MCNC: 24.6 MG/DL (ref 5–40)
WBC NRBC COR # BLD: 7.59 10*3/MM3 (ref 3.4–10.8)

## 2019-11-18 PROCEDURE — 85025 COMPLETE CBC W/AUTO DIFF WBC: CPT | Performed by: FAMILY MEDICINE

## 2019-11-18 PROCEDURE — 84439 ASSAY OF FREE THYROXINE: CPT | Performed by: FAMILY MEDICINE

## 2019-11-18 PROCEDURE — 80061 LIPID PANEL: CPT | Performed by: FAMILY MEDICINE

## 2019-11-18 PROCEDURE — 82306 VITAMIN D 25 HYDROXY: CPT | Performed by: FAMILY MEDICINE

## 2019-11-18 PROCEDURE — 82607 VITAMIN B-12: CPT | Performed by: FAMILY MEDICINE

## 2019-11-18 PROCEDURE — 83036 HEMOGLOBIN GLYCOSYLATED A1C: CPT | Performed by: FAMILY MEDICINE

## 2019-11-18 PROCEDURE — 80053 COMPREHEN METABOLIC PANEL: CPT | Performed by: FAMILY MEDICINE

## 2019-11-21 ENCOUNTER — TELEPHONE (OUTPATIENT)
Dept: FAMILY MEDICINE CLINIC | Facility: CLINIC | Age: 69
End: 2019-11-21

## 2019-11-22 NOTE — PROGRESS NOTES
Diabetic foot exam:   Left: Filament test absent   Pulses Dorsalis Pedis:  thready   Reflexes 1+    Vibratory sensation diminished   Proprioception normal   Sharp/dull discrimination diminished       Right: Filament test present   Pulses Dorsalis Pedis:  thready   Reflexes 1+    Vibratory sensation normal   Proprioception normal   Sharp/dull discrimination diminished

## 2019-11-25 ENCOUNTER — OFFICE VISIT (OUTPATIENT)
Dept: FAMILY MEDICINE CLINIC | Facility: CLINIC | Age: 69
End: 2019-11-25

## 2019-11-25 VITALS
HEIGHT: 62 IN | TEMPERATURE: 98.3 F | WEIGHT: 118 LBS | BODY MASS INDEX: 21.71 KG/M2 | RESPIRATION RATE: 16 BRPM | DIASTOLIC BLOOD PRESSURE: 80 MMHG | SYSTOLIC BLOOD PRESSURE: 140 MMHG | OXYGEN SATURATION: 98 % | HEART RATE: 80 BPM

## 2019-11-25 DIAGNOSIS — G63 POLYNEUROPATHY ASSOCIATED WITH UNDERLYING DISEASE (HCC): ICD-10-CM

## 2019-11-25 DIAGNOSIS — E11.9 TYPE 2 DIABETES MELLITUS WITHOUT COMPLICATION, WITHOUT LONG-TERM CURRENT USE OF INSULIN (HCC): ICD-10-CM

## 2019-11-25 DIAGNOSIS — E53.9 VITAMIN B DEFICIENCY: ICD-10-CM

## 2019-11-25 DIAGNOSIS — M19.90 OSTEOARTHRITIS, UNSPECIFIED OSTEOARTHRITIS TYPE, UNSPECIFIED SITE: ICD-10-CM

## 2019-11-25 DIAGNOSIS — I10 ESSENTIAL HYPERTENSION: Primary | ICD-10-CM

## 2019-11-25 DIAGNOSIS — E78.5 HYPERLIPIDEMIA, UNSPECIFIED HYPERLIPIDEMIA TYPE: ICD-10-CM

## 2019-11-25 PROBLEM — H25.813 COMBINED FORMS OF AGE-RELATED CATARACT, BILATERAL: Status: ACTIVE | Noted: 2019-11-25

## 2019-11-25 PROBLEM — H40.013 OPEN ANGLE WITH BORDERLINE FINDINGS, LOW RISK, BILATERAL: Status: ACTIVE | Noted: 2019-11-25

## 2019-11-25 PROCEDURE — 99214 OFFICE O/P EST MOD 30 MIN: CPT | Performed by: FAMILY MEDICINE

## 2019-11-25 RX ORDER — EPINASTINE HCL 0.05 %
DROPS OPHTHALMIC (EYE) EVERY 12 HOURS
COMMUNITY
Start: 2019-03-08 | End: 2020-12-14 | Stop reason: ALTCHOICE

## 2019-11-25 NOTE — PROGRESS NOTES
Rooming Tab(CC,VS,Pt Hx,Fall Screen)  Chief Complaint   Patient presents with   • Diabetes     f/u       Subjective    Patient is here for her 6-month checkup on her diabetes.  Her recent A1c was 7.4%.  Her lipid panel was excellent.  Her CMP was excellent.  Her CBC was excellent.  B12 and vitamin D are excellent.  She does have some burning and tingling in her feet at times.  We did a more thorough foot exam today neurologically and she does have evidence of some loss of pinprick sensation and some light touch difficulty.  Her pulses in her feet are weak.  Her reflexes are adequate other than the ankles have weak reflexes.      I have reviewed and updated her medications, medical history and problem list during today's office visit.     Patient Care Team:  Donnie Ng MD as PCP - General  Donnie Ng MD as PCP - Claims Attributed    Problem List Tab  Medications Tab  Synopsis Tab  Chart Review Tab  Care Everywhere Tab  Immunizations Tab  Patient History Tab    Social History     Tobacco Use   • Smoking status: Never Smoker   • Smokeless tobacco: Never Used   Substance Use Topics   • Alcohol use: No     Frequency: Never       Review of Systems   Constitutional: Negative.  Negative for diaphoresis, fatigue and fever.   HENT: Negative.  Negative for congestion, hearing loss, sinus pressure, tinnitus and trouble swallowing.    Eyes: Negative.    Respiratory: Negative.  Negative for cough, choking, chest tightness, shortness of breath and wheezing.    Cardiovascular: Negative.  Negative for chest pain and palpitations.   Gastrointestinal: Negative.  Negative for abdominal distention, abdominal pain and GERD.   Endocrine: Negative.    Genitourinary: Negative.  Negative for frequency.   Musculoskeletal: Negative.  Negative for arthralgias, gait problem, joint swelling, myalgias and neck stiffness.   Skin: Negative.  Negative for rash.   Allergic/Immunologic: Negative.  Negative for environmental allergies.  "  Neurological: Negative.  Negative for syncope and speech difficulty.   Hematological: Negative.  Negative for adenopathy.   Psychiatric/Behavioral: Negative for stress.   All other systems reviewed and are negative.      Objective     Rooming Tab(CC,VS,Pt Hx,Fall Screen)  /80 (BP Location: Right arm, Cuff Size: Adult)   Pulse 80   Temp 98.3 °F (36.8 °C) (Oral)   Resp 16   Ht 157.5 cm (62\")   Wt 53.5 kg (118 lb)   SpO2 98%   BMI 21.58 kg/m²     Body mass index is 21.58 kg/m².    Physical Exam   Constitutional: She is oriented to person, place, and time. She appears well-developed and well-nourished.   HENT:   Head: Normocephalic and atraumatic.   Right Ear: External ear normal.   Left Ear: External ear normal.   Nose: Nose normal.   Mouth/Throat: Oropharynx is clear and moist. No oropharyngeal exudate.   Eyes: Conjunctivae and EOM are normal. Pupils are equal, round, and reactive to light. Right eye exhibits no discharge. Left eye exhibits no discharge. No scleral icterus.   Neck: Normal range of motion. Neck supple. No JVD present. No thyromegaly present.   Cardiovascular: Normal rate, regular rhythm, normal heart sounds and intact distal pulses.   No murmur heard.  Pulmonary/Chest: Effort normal and breath sounds normal. No respiratory distress. She has no wheezes. She has no rales. She exhibits no tenderness.   Abdominal: Soft. Bowel sounds are normal. She exhibits no distension. There is no tenderness.   Genitourinary: Rectal exam shows guaiac negative stool.   Musculoskeletal: Normal range of motion. She exhibits no edema, tenderness or deformity.    Janna had a diabetic foot exam performed today.   During the foot exam she had a monofilament test performed.    Neurological Sensory Findings -  Unaltered sharp/dull right ankle/foot discrimination and unaltered sharp/dull left ankle/foot discrimination. No right ankle/foot altered proprioception and no left ankle/foot altered " proprioception  Vascular Status -  Her right foot exhibits normal foot vasculature  and no edema. Her left foot exhibits normal foot vasculature  and no edema.  Skin Integrity  -  Her right foot skin is intact.Her left foot skin is intact..  Lymphadenopathy:     She has no cervical adenopathy.   Neurological: She is alert and oriented to person, place, and time.   Skin: Skin is warm and dry.   Psychiatric: She has a normal mood and affect. Her behavior is normal. Judgment and thought content normal.   Vitals reviewed.       Statin Choice Calculator  Data Reviewed:               Lab Results   Component Value Date    BUN 14 11/18/2019    CREATININE 0.64 11/18/2019    EGFRIFNONA 92 11/18/2019     (H) 11/18/2019    K 4.5 11/18/2019     11/18/2019    CALCIUM 9.5 11/18/2019    ALBUMIN 4.50 11/18/2019    BILITOT 0.4 11/18/2019    ALKPHOS 45 11/18/2019    AST 17 11/18/2019    ALT 13 11/18/2019    TRIG 123 11/18/2019    HDL 74 (H) 11/18/2019    VLDL 24.6 11/18/2019    LDL 80 11/18/2019    LDLHDL 1.09 11/18/2019    WBC 7.59 11/18/2019    RBC 4.96 11/18/2019    HCT 42.7 11/18/2019    MCV 86.1 11/18/2019    MCH 26.6 11/18/2019    FREET4 1.37 11/18/2019    AMAQ61FD 40.7 11/18/2019      Assessment/Plan   Order Review Tab  Health Maintenance Tab  Patient Plan/Order Tab  Diagnoses and all orders for this visit:    1. Essential hypertension (Primary)  Assessment & Plan:  Hypertension is improving with treatment.  Continue current treatment regimen.  Dietary sodium restriction.  Weight loss.  Regular aerobic exercise.  Stop smoking.  Continue current medications.  Medication changes per orders.  Blood pressure will be reassessed at the next regular appointment.    Blood pressure is actually very good today.  She is to continue her lisinopril 10 mg every day      2. Hyperlipidemia, unspecified hyperlipidemia type  Assessment & Plan:  Lipid abnormalities are improving with treatment.  Nutritional counseling was provided.  and Pharmacotherapy as ordered.  Lipids will be reassessed in 6 months.    Patient follows a low-carb diet and exercise which covers both her high lipids and her diabetes.  She takes pravastatin 20 mg every day.  She is treating her diabetes with Victoza injection weekly and metformin and glipizide and far [employer].  Good control of her sugar helps bring her lipids under control also      3. Vitamin B deficiency  Assessment & Plan:  We will check her vitamin B 12 level again today and replace if needed      4. Type 2 diabetes mellitus without complication, without long-term current use of insulin (CMS/[facility])  Assessment & Plan:  Diabetes is improving with treatment.   Continue current treatment regimen.  Reminded to bring in blood sugar diary at next visit.  Dietary recommendations for ADA diet.  Regular aerobic exercise.  Diabetes will be reassessed in 3 months.    Patient follows a low-carb diet and she exercises.  She is taking for [employer] 5 mg every day, glipizide 10 mg every day, metformin at thousand milligrams twice daily, and Victoza 18 mg injected weekly      5. Polyneuropathy associated with underlying disease (CMS/[facility])  Assessment & Plan:  Patient beginning to show signs and symptoms of a peripheral neuropathy.  She has some burning and stinging and tingling in her hands and feet.  It comes and goes.  We will work with this by trying to control her sugars well and then she can buy some meat takes over-the-counter which is a Methylfolate derivative that has been shown to help early diabetic peripheral neuropathy.      6. Osteoarthritis, unspecified osteoarthritis type, unspecified site  Assessment & Plan:  Patient has osteoarthritic changes mainly noticeable in her hands elbows shoulders back hips and knees.  She also has some ankle and foot arthritis that is bothersome.  Because of her hypertension and her diabetes we have to be careful with her kidneys as far as using NSAIDs.  She basically does not take anything  for her arthritis and less the pain gets severe and then she takes Tylenol and adds in either hydrocodone or tramadol        Wrapup Tab  Return in about 4 months (around 3/25/2020) for Medicare Wellness.

## 2019-11-25 NOTE — PATIENT INSTRUCTIONS
Peripheral neuro  Diabetic Neuropathy  Diabetic neuropathy refers to nerve damage that is caused by diabetes (diabetes mellitus). Over time, people with diabetes can develop nerve damage throughout the body. There are several types of diabetic neuropathy:  · Peripheral neuropathy. This is the most common type of diabetic neuropathy. It causes damage to nerves that carry signals between the spinal cord and other parts of the body (peripheral nerves). This usually affects nerves in the feet and legs first, and may eventually affect the hands and arms. The damage affects the ability to sense touch or temperature.  · Autonomic neuropathy. This type causes damage to nerves that control involuntary functions (autonomic nerves). These nerves carry signals that control:  ? Heartbeat.  ? Body temperature.  ? Blood pressure.  ? Urination.  ? Digestion.  ? Sweating.  ? Sexual function.  ? Response to changing blood sugar (glucose) levels.  · Focal neuropathy. This type of nerve damage affects one area of the body, such as an arm, a leg, or the face. The injury may involve one nerve or a small group of nerves. Focal neuropathy can be painful and unpredictable, and occurs most often in older adults with diabetes. This often develops suddenly, but usually improves over time and does not cause long-term problems.  · Proximal neuropathy. This type of nerve damage affects the nerves of the thighs, hips, buttocks, or legs. It causes severe pain, weakness, and muscle death (atrophy), usually in the thigh muscles. It is more common among older men and people who have type 2 diabetes. The length of recovery time may vary.  What are the causes?  Peripheral, autonomic, and focal neuropathies are caused by diabetes that is not well controlled with treatment. The cause of proximal neuropathy is not known, but it may be caused by inflammation related to uncontrolled blood glucose levels.  What are the signs or symptoms?  Peripheral  neuropathy  Peripheral neuropathy develops slowly over time. When the nerves of the feet and legs no longer work, you may experience:  · Burning, stabbing, or aching pain in the legs or feet.  · Pain or cramping in the legs or feet.  · Loss of feeling (numbness) and inability to feel pressure or pain in the feet. This can lead to:  ? Thick calluses or sores on areas of constant pressure.  ? Ulcers.  ? Reduced ability to feel temperature changes.  · Foot deformities.  · Muscle weakness.  · Loss of balance or coordination.  Autonomic neuropathy  The symptoms of autonomic neuropathy vary depending on which nerves are affected. Symptoms may include:  · Problems with digestion, such as:  ? Nausea or vomiting.  ? Poor appetite.  ? Bloating.  ? Diarrhea or constipation.  ? Trouble swallowing.  ? Losing weight without trying to.  · Problems with the heart, blood and lungs, such as:  ? Dizziness, especially when standing up.  ? Fainting.  ? Shortness of breath.  ? Irregular heartbeat.  · Bladder problems, such as:  ? Trouble starting or stopping urination.  ? Leaking urine.  ? Trouble emptying the bladder.  ? Urinary tract infections (UTIs).  · Problems with other body functions, such as:  ? Sweat. You may sweat too much or too little.  ? Temperature. You might get hot easily. Or, you might feel cold more than usual.  ? Sexual function. Men may not be able to get or maintain an erection. Women may have vaginal dryness and difficulty with arousal.  Focal neuropathy  Symptoms affect only one area of the body. Common symptoms include:  · Numbness.  · Tingling.  · Burning pain.  · Prickling feeling.  · Very sensitive skin.  · Weakness.  · Inability to move (paralysis).  · Muscle twitching.  · Muscles getting smaller (wasting).  · Poor coordination.  · Double or blurred vision.  Proximal neuropathy  · Sudden, severe pain in the hip, thigh, or buttocks. Pain may spread from the back into the legs (sciatica).  · Pain and numbness  in the arms and legs.  · Tingling.  · Loss of bladder control or bowel control.  · Weakness and wasting of thigh muscles.  · Difficulty getting up from a seated position.  · Abdominal swelling.  · Unexplained weight loss.  How is this diagnosed?  Diagnosis usually involves reviewing your medical history and any symptoms you have. Diagnosis varies depending on the type of neuropathy your health care provider suspects.  Peripheral neuropathy  Your health care provider will check areas that are affected by your nervous system (neurologic exam), such as your reflexes, how you move, and what you can feel. You may have other tests, such as:  · Blood tests.  · Removal and examination of fluid that surrounds the spinal cord (lumbar puncture).  · CT scan.  · MRI.  · A test to check the nerves that control muscles (electromyogram, EMG).  · Tests of how quickly messages pass through your nerves (nerve conduction velocity tests).  · Removal of a small piece of nerve to be examined under a microscope (biopsy).  Autonomic neuropathy  You may have tests, such as:  · Tests to measure your blood pressure and heart rate. This may include monitoring you while you are safely secured to an exam table that moves you from a lying position to an upright position (table tilt test).  · Breathing tests to check your lungs.  · Tests to check how food moves through the digestive system (gastric emptying tests).  · Blood, sweat, or urine tests.  · Ultrasound of your bladder.  · Spinal fluid tests.  Focal neuropathy  This condition may be diagnosed with:  · A neurologic exam.  · CT scan.  · MRI.  · EMG.  · Nerve conduction velocity tests.  Proximal neuropathy  There is no test to diagnose this type of neuropathy. You may have tests to rule out other possible causes of this type of neuropathy. Tests may include:  · X-rays of your spine and lumbar region.  · Lumbar puncture.  · MRI.  How is this treated?  The goal of treatment is to keep nerve  damage from getting worse. The most important part of treatment is keeping your blood glucose level and your A1C level within your target range by following your diabetes management plan. Over time, maintaining lower blood glucose levels helps lessen symptoms. In some cases, you may need prescription pain medicine.  Follow these instructions at home:    Lifestyle    · Do not use any products that contain nicotine or tobacco, such as cigarettes and e-cigarettes. If you need help quitting, ask your health care provider.  · Be physically active every day. Include strength training and balance exercises.  · Follow a healthy meal plan.  · Work with your health care provider to manage your blood pressure.  General instructions  · Follow your diabetes management plan as directed.  ? Check your blood glucose levels as directed by your health care provider.  ? Keep your blood glucose in your target range as directed by your health care provider.  ? Have your A1C level checked at least two times a year, or as often as told by your health care provider.  · Take over the counter and prescription medicines only as told by your health care provider. This includes insulin and diabetes medicine.  · Do not drive or use heavy machinery while taking prescription pain medicines.  · Check your skin and feet every day for cuts, bruises, redness, blisters, or sores.  · Keep all follow up visits as told by your health care provider. This is important.  Contact a health care provider if:  · You have burning, stabbing, or aching pain in your legs or feet.  · You are unable to feel pressure or pain in your feet.  · You develop problems with digestion, such as:  ? Nausea.  ? Vomiting.  ? Bloating.  ? Constipation.  ? Diarrhea.  ? Abdominal pain.  · You have difficulty with urination, such as inability:  ? To control when you urinate (incontinence).  ? To completely empty the bladder (retention).  · You have palpitations.  · You feel dizzy,  weak, or faint when you stand up.  Get help right away if:  · You cannot urinate.  · You have sudden weakness or loss of coordination.  · You have trouble speaking.  · You have pain or pressure in your chest.  · You have an irregular heart beat.  · You have sudden inability to move a part of your body.  Summary  · Diabetic neuropathy refers to nerve damage that is caused by diabetes. It can affect nerves throughout the entire body, causing numbness and pain in the arms, legs, digestive tract, heart, and other body systems.  · Keep your blood glucose level and your blood pressure in your target range, as directed by your health care provider. This can help prevent neuropathy from getting worse.  · Check your skin and feet every day for cuts, bruises, redness, blisters, or sores.  · Do not use any products that contain nicotine or tobacco, such as cigarettes and e-cigarettes. If you need help quitting, ask your health care provider.  This information is not intended to replace advice given to you by your health care provider. Make sure you discuss any questions you have with your health care provider.  Document Released: 02/26/2003 Document Revised: 01/30/2019 Document Reviewed: 01/22/2018  [employer] Patient Education © 2019 [employer]

## 2019-11-28 NOTE — ASSESSMENT & PLAN NOTE
Patient has osteoarthritic changes mainly noticeable in her hands elbows shoulders back hips and knees.  She also has some ankle and foot arthritis that is bothersome.  Because of her hypertension and her diabetes we have to be careful with her kidneys as far as using NSAIDs.  She basically does not take anything for her arthritis and less the pain gets severe and then she takes Tylenol and adds in either hydrocodone or tramadol

## 2019-11-28 NOTE — ASSESSMENT & PLAN NOTE
Hypertension is improving with treatment.  Continue current treatment regimen.  Dietary sodium restriction.  Weight loss.  Regular aerobic exercise.  Stop smoking.  Continue current medications.  Medication changes per orders.  Blood pressure will be reassessed at the next regular appointment.    Blood pressure is actually very good today.  She is to continue her lisinopril 10 mg every day

## 2019-11-28 NOTE — ASSESSMENT & PLAN NOTE
Diabetes is improving with treatment.   Continue current treatment regimen.  Reminded to bring in blood sugar diary at next visit.  Dietary recommendations for ADA diet.  Regular aerobic exercise.  Diabetes will be reassessed in 3 months.    Patient follows a low-carb diet and she exercises.  She is taking for CIGA 5 mg every day, glipizide 10 mg every day, metformin at thousand milligrams twice daily, and Victoza 18 mg injected weekly

## 2019-11-28 NOTE — ASSESSMENT & PLAN NOTE
Lipid abnormalities are improving with treatment.  Nutritional counseling was provided. and Pharmacotherapy as ordered.  Lipids will be reassessed in 6 months.    Patient follows a low-carb diet and exercise which covers both her high lipids and her diabetes.  She takes pravastatin 20 mg every day.  She is treating her diabetes with Victoza injection weekly and metformin and glipizide and far CIGA.  Good control of her sugar helps bring her lipids under control also

## 2019-11-28 NOTE — ASSESSMENT & PLAN NOTE
Patient beginning to show signs and symptoms of a peripheral neuropathy.  She has some burning and stinging and tingling in her hands and feet.  It comes and goes.  We will work with this by trying to control her sugars well and then she can buy some meat takes over-the-counter which is a Methylfolate derivative that has been shown to help early diabetic peripheral neuropathy.

## 2019-12-02 RX ORDER — PRAVASTATIN SODIUM 20 MG
TABLET ORAL
Qty: 90 TABLET | Refills: 2 | Status: SHIPPED | OUTPATIENT
Start: 2019-12-02 | End: 2020-08-24

## 2019-12-02 RX ORDER — GLIPIZIDE 10 MG/1
TABLET ORAL
Qty: 180 TABLET | Refills: 2 | Status: SHIPPED | OUTPATIENT
Start: 2019-12-02 | End: 2020-08-24

## 2020-01-08 DIAGNOSIS — Z12.39 SCREENING FOR BREAST CANCER: Primary | ICD-10-CM

## 2020-02-19 ENCOUNTER — TELEPHONE (OUTPATIENT)
Dept: FAMILY MEDICINE CLINIC | Facility: CLINIC | Age: 70
End: 2020-02-19

## 2020-02-19 DIAGNOSIS — E11.9 TYPE 2 DIABETES MELLITUS WITHOUT COMPLICATION, WITHOUT LONG-TERM CURRENT USE OF INSULIN (HCC): Primary | ICD-10-CM

## 2020-02-21 DIAGNOSIS — E11.9 TYPE 2 DIABETES MELLITUS WITHOUT COMPLICATION, WITHOUT LONG-TERM CURRENT USE OF INSULIN (HCC): ICD-10-CM

## 2020-02-21 RX ORDER — LIRAGLUTIDE 6 MG/ML
INJECTION SUBCUTANEOUS
Qty: 9 PEN | Refills: 1 | Status: SHIPPED | OUTPATIENT
Start: 2020-02-21 | End: 2020-07-20

## 2020-02-27 DIAGNOSIS — Z12.39 SCREENING FOR BREAST CANCER: ICD-10-CM

## 2020-04-23 ENCOUNTER — TELEPHONE (OUTPATIENT)
Dept: FAMILY MEDICINE CLINIC | Facility: CLINIC | Age: 70
End: 2020-04-23

## 2020-05-04 RX ORDER — LISINOPRIL 10 MG/1
TABLET ORAL
Qty: 90 TABLET | Refills: 0 | Status: SHIPPED | OUTPATIENT
Start: 2020-05-04 | End: 2020-07-20

## 2020-06-01 ENCOUNTER — LAB (OUTPATIENT)
Dept: FAMILY MEDICINE CLINIC | Facility: CLINIC | Age: 70
End: 2020-06-01

## 2020-06-01 DIAGNOSIS — E11.9 TYPE 2 DIABETES MELLITUS WITHOUT COMPLICATION, WITHOUT LONG-TERM CURRENT USE OF INSULIN (HCC): ICD-10-CM

## 2020-06-01 DIAGNOSIS — E78.5 HYPERLIPIDEMIA, UNSPECIFIED HYPERLIPIDEMIA TYPE: ICD-10-CM

## 2020-06-01 DIAGNOSIS — E78.5 HYPERLIPIDEMIA, UNSPECIFIED HYPERLIPIDEMIA TYPE: Primary | ICD-10-CM

## 2020-06-01 LAB
ALBUMIN SERPL-MCNC: 4.4 G/DL (ref 3.5–5.2)
ALBUMIN/GLOB SERPL: 1.9 G/DL
ALP SERPL-CCNC: 35 U/L (ref 39–117)
ALT SERPL W P-5'-P-CCNC: 10 U/L (ref 1–33)
ANION GAP SERPL CALCULATED.3IONS-SCNC: 13 MMOL/L (ref 5–15)
AST SERPL-CCNC: 17 U/L (ref 1–32)
BASOPHILS # BLD AUTO: 0.03 10*3/MM3 (ref 0–0.2)
BASOPHILS NFR BLD AUTO: 0.5 % (ref 0–1.5)
BILIRUB SERPL-MCNC: 0.3 MG/DL (ref 0.2–1.2)
BUN BLD-MCNC: 17 MG/DL (ref 8–23)
BUN/CREAT SERPL: 23.9 (ref 7–25)
CALCIUM SPEC-SCNC: 9.3 MG/DL (ref 8.6–10.5)
CHLORIDE SERPL-SCNC: 97 MMOL/L (ref 98–107)
CHOLEST SERPL-MCNC: 132 MG/DL (ref 0–200)
CO2 SERPL-SCNC: 26 MMOL/L (ref 22–29)
CREAT BLD-MCNC: 0.71 MG/DL (ref 0.57–1)
DEPRECATED RDW RBC AUTO: 41.8 FL (ref 37–54)
EOSINOPHIL # BLD AUTO: 0.15 10*3/MM3 (ref 0–0.4)
EOSINOPHIL NFR BLD AUTO: 2.5 % (ref 0.3–6.2)
ERYTHROCYTE [DISTWIDTH] IN BLOOD BY AUTOMATED COUNT: 13.4 % (ref 12.3–15.4)
GFR SERPL CREATININE-BSD FRML MDRD: 81 ML/MIN/1.73
GLOBULIN UR ELPH-MCNC: 2.3 GM/DL
GLUCOSE BLD-MCNC: 151 MG/DL (ref 65–99)
HBA1C MFR BLD: 7.7 % (ref 3.5–5.6)
HCT VFR BLD AUTO: 37.7 % (ref 34–46.6)
HDLC SERPL-MCNC: 59 MG/DL (ref 40–60)
HGB BLD-MCNC: 12.3 G/DL (ref 12–15.9)
IMM GRANULOCYTES # BLD AUTO: 0.02 10*3/MM3 (ref 0–0.05)
IMM GRANULOCYTES NFR BLD AUTO: 0.3 % (ref 0–0.5)
LDLC SERPL CALC-MCNC: 54 MG/DL (ref 0–100)
LDLC/HDLC SERPL: 0.92 {RATIO}
LYMPHOCYTES # BLD AUTO: 2.25 10*3/MM3 (ref 0.7–3.1)
LYMPHOCYTES NFR BLD AUTO: 37.6 % (ref 19.6–45.3)
MCH RBC QN AUTO: 28 PG (ref 26.6–33)
MCHC RBC AUTO-ENTMCNC: 32.6 G/DL (ref 31.5–35.7)
MCV RBC AUTO: 85.7 FL (ref 79–97)
MONOCYTES # BLD AUTO: 0.64 10*3/MM3 (ref 0.1–0.9)
MONOCYTES NFR BLD AUTO: 10.7 % (ref 5–12)
NEUTROPHILS # BLD AUTO: 2.9 10*3/MM3 (ref 1.7–7)
NEUTROPHILS NFR BLD AUTO: 48.4 % (ref 42.7–76)
NRBC BLD AUTO-RTO: 0 /100 WBC (ref 0–0.2)
PLATELET # BLD AUTO: 344 10*3/MM3 (ref 140–450)
PMV BLD AUTO: 11 FL (ref 6–12)
POTASSIUM BLD-SCNC: 4.2 MMOL/L (ref 3.5–5.2)
PROT SERPL-MCNC: 6.7 G/DL (ref 6–8.5)
RBC # BLD AUTO: 4.4 10*6/MM3 (ref 3.77–5.28)
SODIUM BLD-SCNC: 136 MMOL/L (ref 136–145)
TRIGL SERPL-MCNC: 94 MG/DL (ref 0–150)
TSH SERPL DL<=0.05 MIU/L-ACNC: 2.17 UIU/ML (ref 0.27–4.2)
VLDLC SERPL-MCNC: 18.8 MG/DL (ref 5–40)
WBC NRBC COR # BLD: 5.99 10*3/MM3 (ref 3.4–10.8)

## 2020-06-01 PROCEDURE — 84443 ASSAY THYROID STIM HORMONE: CPT | Performed by: FAMILY MEDICINE

## 2020-06-01 PROCEDURE — 85025 COMPLETE CBC W/AUTO DIFF WBC: CPT | Performed by: FAMILY MEDICINE

## 2020-06-01 PROCEDURE — 80053 COMPREHEN METABOLIC PANEL: CPT | Performed by: FAMILY MEDICINE

## 2020-06-01 PROCEDURE — 80061 LIPID PANEL: CPT | Performed by: FAMILY MEDICINE

## 2020-06-01 PROCEDURE — 83036 HEMOGLOBIN GLYCOSYLATED A1C: CPT | Performed by: FAMILY MEDICINE

## 2020-06-01 NOTE — PROGRESS NOTES
Tell Janna to increase her Farxiga to 10 mg a day.  All other medicines the same.  Everything is looking better.  Blood sugars are improving and almost to where we want them.  Keep up the good work.

## 2020-06-05 NOTE — PROGRESS NOTES
The ABCs of the Annual Wellness Visit  Subsequent Medicare Wellness Visit    Chief Complaint   Patient presents with   • Medicare Wellness-subsequent       Subjective   History of Present Illness:  Janna Flowers is a 70 y.o. female who presents for a Subsequent Medicare Wellness Visit.  This visit is her wellness visit and she had the health risk assessment below.  Nothing on the risk assessment came up showing any concern either new or old.  Screening tests recommended:    Colonoscopy -up-to-date  Mammogram-up to date  DEXA--needs to recheck this may be this summer  PAP/ Pelvic--aged out  Diabetic eye exam- up to date  Diabetic foot exam--today        Immunization:  Influenza--up to date  Prevnar-up to date  Pneumovax-up to date  Tetanus--needs  Shingles vaccine-needs to get  Hepatitis  -will get                                Patient is here for a physical and review of her ongoing medical problems.  We will her in the office for hypertension, hyperlipidemia, diabetes type 2, peripheral neuropathy, degenerative joint disease, and environmental allergies.  She takes medications and treatments for these problems which we will review today after her exam and she will have lab tests done and reviewed today.  We will make adjustment in her medicine depending on the results of that testing.                HEALTH RISK ASSESSMENT    Recent Hospitalizations:  No hospitalization(s) within the last year.    Current Medical Providers:  Patient Care Team:  Donnie Ng MD as PCP - General  Donnie Ng MD as PCP - Claims Attributed    Smoking Status:  Social History     Tobacco Use   Smoking Status Never Smoker   Smokeless Tobacco Never Used       Alcohol Consumption:  Social History     Substance and Sexual Activity   Alcohol Use No   • Frequency: Never       Depression Screen:   PHQ-2/PHQ-9 Depression Screening 6/8/2020   Little interest or pleasure in doing things 0   Feeling down, depressed, or hopeless 0    Total Score 0       Fall Risk Screen:  ELISAJANNA Fall Risk Assessment has not been completed.    Health Habits and Functional and Cognitive Screening:  Functional & Cognitive Status 6/8/2020   Do you have difficulty preparing food and eating? No   Do you have difficulty bathing yourself, getting dressed or grooming yourself? No   Do you have difficulty using the toilet? No   Do you have difficulty moving around from place to place? No   Do you have trouble with steps or getting out of a bed or a chair? No   Current Diet Diabetic Diet   Dental Exam Up to date   Eye Exam Up to date   Exercise (times per week) 7 times per week   Current Exercise Activities Include Walking   Do you need help using the phone?  No   Are you deaf or do you have serious difficulty hearing?  No   Do you need help with transportation? No   Do you need help shopping? No   Do you need help preparing meals?  No   Do you need help with housework?  No   Do you need help with laundry? No   Do you need help taking your medications? No   Do you need help managing money? No   Do you ever drive or ride in a car without wearing a seat belt? No         Does the patient have evidence of cognitive impairment? No    Asprin use counseling:Does not need ASA (and currently is not on it)    Age-appropriate Screening Schedule:  Refer to the list below for future screening recommendations based on patient's age, sex and/or medical conditions. Orders for these recommended tests are listed in the plan section. The patient has been provided with a written plan.    Health Maintenance   Topic Date Due   • URINE MICROALBUMIN  1950   • TDAP/TD VACCINES (1 - Tdap) 02/24/1961   • ZOSTER VACCINE (1 of 2) 02/24/2000   • INFLUENZA VACCINE  08/01/2020   • DIABETIC FOOT EXAM  11/25/2020   • HEMOGLOBIN A1C  12/01/2020   • DIABETIC EYE EXAM  02/17/2021   • LIPID PANEL  06/01/2021   • MAMMOGRAM  02/24/2022   • COLONOSCOPY  05/01/2027          The following portions of the  patient's history were reviewed and updated as appropriate: allergies, current medications, past family history, past medical history, past social history, past surgical history and problem list.    Outpatient Medications Prior to Visit   Medication Sig Dispense Refill   • azelastine (OPTIVAR) 0.05 % ophthalmic solution      • Blood Glucose Monitoring Suppl (ACCU-CHEK AVINASH) device Needs to new machine  Dx e11.9     (any brand is ok ) 1 each 0   • Blood Glucose Monitoring Suppl (ACCU-CHEK COMPACT CARE KIT) kit ACCU-CHEK COMPACT PLUS CARE KIT     • CLARITIN-D 12 HOUR 5-120 MG per 12 hr tablet Take 1 tablet by mouth 2 (Two) Times a Day. 60 tablet 3   • Epinastine HCl 0.05 % ophthalmic solution Apply  to eye(s) as directed by provider Every 12 (Twelve) Hours.     • FARXIGA 5 MG tablet tablet      • glipizide (GLUCOTROL) 10 MG tablet TAKE ONE TABLET BY MOUTH TWICE A  tablet 2   • glucose blood (Accu-Chek Avinash) test strip Use bid  Dx e11.9 200 each 12   • lisinopril (PRINIVIL,ZESTRIL) 10 MG tablet TAKE ONE TABLET BY MOUTH DAILY 90 tablet 0   • metFORMIN (GLUCOPHAGE) 1000 MG tablet TAKE ONE TABLET BY MOUTH TWICE A  tablet 2   • pravastatin (PRAVACHOL) 20 MG tablet TAKE ONE TABLET BY MOUTH DAILY 90 tablet 2   • VICTOZA 18 MG/3ML solution pen-injector injection DIAL AND INJECT SUBCUTANEOUSLY 1.8MG DAILY 9 pen 1     No facility-administered medications prior to visit.        Patient Active Problem List   Diagnosis   • Diabetes mellitus, type II (CMS/[facility])   • Essential hypertension   • Hay fever   • Hyperlipidemia   • Osteoarthritis   • Peripheral neuropathy   • Vitamin B deficiency   • Environmental and seasonal allergies   • Open angle with borderline findings, low risk, bilateral   • Presbyopia   • Combined forms of age-related cataract, bilateral   • Vitreous degeneration   • Combined form of senile cataract   • Medicare annual wellness visit, subsequent   • Osteopenia of multiple sites       Advanced Care  "Planning:  ACP discussion was held with the patient during this visit. Patient does not have an advance directive, information provided.    Review of Systems   Constitutional: Negative.  Negative for fatigue.   HENT: Negative for congestion, ear pain, postnasal drip, rhinorrhea, sore throat, trouble swallowing and voice change.    Eyes: Negative.  Negative for redness and visual disturbance.   Respiratory: Negative.  Negative for cough, chest tightness and shortness of breath.    Cardiovascular: Negative for chest pain and palpitations.   Gastrointestinal: Negative.  Negative for abdominal distention and nausea.   Endocrine: Negative.  Negative for heat intolerance and polydipsia.   Genitourinary: Negative for decreased urine volume, difficulty urinating, flank pain and pelvic pain.   Musculoskeletal: Negative for arthralgias, back pain, myalgias and neck stiffness.   Skin: Negative.  Negative for rash.   Allergic/Immunologic: Negative.  Negative for environmental allergies and food allergies.   Neurological: Negative.  Negative for syncope, speech difficulty, weakness and light-headedness.   Hematological: Negative.  Negative for adenopathy.   Psychiatric/Behavioral: Negative.  Negative for behavioral problems, confusion and dysphoric mood. The patient is not nervous/anxious.    All other systems reviewed and are negative.      Compared to one year ago, the patient feels her physical health is the same.  Compared to one year ago, the patient feels her mental health is the same.    Reviewed chart for potential of high risk medication in the elderly: yes  Reviewed chart for potential of harmful drug interactions in the elderly:yes    Objective         Vitals:    06/08/20 0933   BP: 150/76   Pulse: (!) 8   Resp: 8   Temp: 99.1 °F (37.3 °C)   TempSrc: Temporal   SpO2: 97%   Weight: 54 kg (119 lb)   Height: 157.5 cm (62\")       Body mass index is 21.77 kg/m².  Discussed the patient's BMI with her. The BMI is in the " acceptable range.    Physical Exam   Constitutional: She is oriented to person, place, and time. She appears well-developed and well-nourished.   HENT:   Head: Normocephalic.   Right Ear: External ear normal.   Left Ear: External ear normal.   Nose: Nose normal.   Mouth/Throat: Oropharynx is clear and moist. No oropharyngeal exudate.   Eyes: Pupils are equal, round, and reactive to light. Conjunctivae and EOM are normal.   Neck: Normal range of motion. Neck supple.   Cardiovascular: Normal rate, regular rhythm, normal heart sounds and intact distal pulses.   Pulmonary/Chest: Effort normal and breath sounds normal.   Abdominal: Soft. Bowel sounds are normal.   Musculoskeletal: Normal range of motion.   Neurological: She is alert and oriented to person, place, and time.   Skin: Skin is warm and dry.   Psychiatric: She has a normal mood and affect. Her behavior is normal. Judgment and thought content normal.       Lab Results   Component Value Date    TRIG 94 06/01/2020    HDL 59 06/01/2020    LDL 54 06/01/2020    VLDL 18.8 06/01/2020    HGBA1C 7.7 (H) 06/01/2020        Assessment/Plan   Medicare Risks and Personalized Health Plan  CMS Preventative Services Quick Reference  Advance Directive Discussion  Breast Cancer/Mammogram Screening  Colon Cancer Screening  Glaucoma Risk  Immunizations Discussed/Encouraged (specific immunizations; Td and Shingrix )  Osteoprorosis Risk    The above risks/problems have been discussed with the patient.  Pertinent information has been shared with the patient in the After Visit Summary.  Follow up plans and orders are seen below in the Assessment/Plan Section.    Diagnoses and all orders for this visit:    1. Medicare annual wellness visit, subsequent (Primary)  -     DEXA Bone Density Axial; Future    2. Osteopenia of multiple sites  -     DEXA Bone Density Axial; Future    3. Essential hypertension  Assessment & Plan:  Hypertension is improving with treatment.  Continue current  treatment regimen.  Dietary sodium restriction.  Weight loss.  Regular aerobic exercise.  Continue current medications.  Blood pressure will be reassessed at the next regular appointment.    Her home readings and work readings are ok.      4. Mixed hyperlipidemia  Assessment & Plan:  Lipid abnormalities are improving with treatment.  Nutritional counseling was provided. and Pharmacotherapy as ordered.  Lipids will be reassessed in 1 year.    Patient is on Pravastatin 20mg her lipid panel is excellent.        5. Type 2 diabetes mellitus without complication, without long-term current use of insulin (CMS/[facility])  Assessment & Plan:  Diabetes is improving with treatment.   Continue current treatment regimen.  Reminded to bring in blood sugar diary at next visit.  Dietary recommendations for ADA diet.  Regular aerobic exercise.  Diabetes will be reassessed in 1 year.    Patient A1c is 7.7  She is on Farxiga 10mg , victoza 18 q week,  [id number] bid, glipizide 10 bid      6. Polyneuropathy associated with underlying disease (CMS/[facility])  Assessment & Plan:  Consider  metanx      7. Environmental and seasonal allergies  Assessment & Plan:  Optivar, claritin D,       8. Osteoarthritis, unspecified osteoarthritis type, unspecified site  Assessment & Plan:  DJD in back and knees and shoulders.      Follow Up:  No follow-ups on file.     An After Visit Summary and PPPS were given to the patient.

## 2020-06-08 ENCOUNTER — OFFICE VISIT (OUTPATIENT)
Dept: FAMILY MEDICINE CLINIC | Facility: CLINIC | Age: 70
End: 2020-06-08

## 2020-06-08 ENCOUNTER — TELEPHONE (OUTPATIENT)
Dept: FAMILY MEDICINE CLINIC | Facility: CLINIC | Age: 70
End: 2020-06-08

## 2020-06-08 VITALS
TEMPERATURE: 99.1 F | OXYGEN SATURATION: 97 % | WEIGHT: 119 LBS | RESPIRATION RATE: 8 BRPM | HEIGHT: 62 IN | BODY MASS INDEX: 21.9 KG/M2 | SYSTOLIC BLOOD PRESSURE: 150 MMHG | DIASTOLIC BLOOD PRESSURE: 76 MMHG | HEART RATE: 8 BPM

## 2020-06-08 DIAGNOSIS — M85.89 OSTEOPENIA OF MULTIPLE SITES: ICD-10-CM

## 2020-06-08 DIAGNOSIS — G63 POLYNEUROPATHY ASSOCIATED WITH UNDERLYING DISEASE (HCC): ICD-10-CM

## 2020-06-08 DIAGNOSIS — J30.89 ENVIRONMENTAL AND SEASONAL ALLERGIES: ICD-10-CM

## 2020-06-08 DIAGNOSIS — Z00.00 MEDICARE ANNUAL WELLNESS VISIT, SUBSEQUENT: Primary | ICD-10-CM

## 2020-06-08 DIAGNOSIS — M19.90 OSTEOARTHRITIS, UNSPECIFIED OSTEOARTHRITIS TYPE, UNSPECIFIED SITE: ICD-10-CM

## 2020-06-08 DIAGNOSIS — E11.9 TYPE 2 DIABETES MELLITUS WITHOUT COMPLICATION, WITHOUT LONG-TERM CURRENT USE OF INSULIN (HCC): ICD-10-CM

## 2020-06-08 DIAGNOSIS — I10 ESSENTIAL HYPERTENSION: ICD-10-CM

## 2020-06-08 DIAGNOSIS — E78.2 MIXED HYPERLIPIDEMIA: ICD-10-CM

## 2020-06-08 PROCEDURE — G0439 PPPS, SUBSEQ VISIT: HCPCS | Performed by: FAMILY MEDICINE

## 2020-06-08 PROCEDURE — 99214 OFFICE O/P EST MOD 30 MIN: CPT | Performed by: FAMILY MEDICINE

## 2020-06-08 NOTE — PATIENT INSTRUCTIONS
Medicare Wellness  Personal Prevention Plan of Service     Date of Office Visit:  2020  Encounter Provider:  Donnie Ng MD  Place of Service:  [facility]  Patient Name: Janna Flowers  :  1950    As part of the Medicare Wellness portion of your visit today, we are providing you with this personalized preventive plan of services (PPPS). This plan is based upon recommendations of the [place] Preventive Services Task Force (USPSTF) and the Advisory Committee on Immunization Practices (ACIP).    This lists the preventive care services that should be considered, and provides dates of when you are due. Items listed as completed are up-to-date and do not require any further intervention.    Health Maintenance   Topic Date Due   • URINE MICROALBUMIN  1950   • TDAP/TD VACCINES (1 - Tdap) 1961   • ZOSTER VACCINE (1 of 2) 2000   • HEPATITIS C SCREENING  2019   • MEDICARE ANNUAL WELLNESS  2019   • INFLUENZA VACCINE  2020   • DIABETIC FOOT EXAM  2020   • HEMOGLOBIN A1C  2020   • DIABETIC EYE EXAM  2021   • LIPID PANEL  2021   • MAMMOGRAM  2022   • COLONOSCOPY  2027   • Pneumococcal Vaccine Once at 65 Years Old  Completed       Orders Placed This Encounter   Procedures   • DEXA Bone Density Axial     Standing Status:   Future     Standing Expiration Date:   2021     Order Specific Question:   Reason for Exam:     Answer:   screening for osteopenia     Order Specific Question:   Does this patient have a diabetic monitoring/medication delivering device on?     Answer:   No       No follow-ups on file.

## 2020-06-08 NOTE — ASSESSMENT & PLAN NOTE
Lipid abnormalities are improving with treatment.  Nutritional counseling was provided. and Pharmacotherapy as ordered.  Lipids will be reassessed in 1 year.    Patient is on Pravastatin 20mg her lipid panel is excellent.

## 2020-06-08 NOTE — ASSESSMENT & PLAN NOTE
Hypertension is improving with treatment.  Continue current treatment regimen.  Dietary sodium restriction.  Weight loss.  Regular aerobic exercise.  Continue current medications.  Blood pressure will be reassessed at the next regular appointment.    Her home readings and work readings are ok.

## 2020-06-08 NOTE — ASSESSMENT & PLAN NOTE
Diabetes is improving with treatment.   Continue current treatment regimen.  Reminded to bring in blood sugar diary at next visit.  Dietary recommendations for ADA diet.  Regular aerobic exercise.  Diabetes will be reassessed in 1 year.    Patient A1c is 7.7  She is on Farxiga 10mg , victoza 18 q week,  [id number] bid, glipizide 10 bid

## 2020-06-11 ENCOUNTER — TELEPHONE (OUTPATIENT)
Dept: FAMILY MEDICINE CLINIC | Facility: CLINIC | Age: 70
End: 2020-06-11

## 2020-06-15 DIAGNOSIS — Z00.00 MEDICARE ANNUAL WELLNESS VISIT, SUBSEQUENT: ICD-10-CM

## 2020-06-15 DIAGNOSIS — M85.89 OSTEOPENIA OF MULTIPLE SITES: ICD-10-CM

## 2020-07-19 DIAGNOSIS — E11.9 TYPE 2 DIABETES MELLITUS WITHOUT COMPLICATION, WITHOUT LONG-TERM CURRENT USE OF INSULIN (HCC): ICD-10-CM

## 2020-07-20 ENCOUNTER — TELEPHONE (OUTPATIENT)
Dept: FAMILY MEDICINE CLINIC | Facility: CLINIC | Age: 70
End: 2020-07-20

## 2020-07-20 RX ORDER — LISINOPRIL 10 MG/1
TABLET ORAL
Qty: 90 TABLET | Refills: 0 | Status: SHIPPED | OUTPATIENT
Start: 2020-07-20 | End: 2020-10-22

## 2020-07-20 RX ORDER — LIRAGLUTIDE 6 MG/ML
INJECTION SUBCUTANEOUS
Qty: 3 ML | Refills: 3 | Status: SHIPPED | OUTPATIENT
Start: 2020-07-20 | End: 2020-11-25

## 2020-07-20 NOTE — TELEPHONE ENCOUNTER
Spoke with pt and she stated she was in on June and needed refills on medication and wants to see if you could fill what you can for her. thanks

## 2020-08-24 RX ORDER — PRAVASTATIN SODIUM 20 MG
TABLET ORAL
Qty: 90 TABLET | Refills: 1 | Status: SHIPPED | OUTPATIENT
Start: 2020-08-24 | End: 2021-02-26

## 2020-08-24 RX ORDER — GLIPIZIDE 10 MG/1
TABLET ORAL
Qty: 180 TABLET | Refills: 1 | Status: SHIPPED | OUTPATIENT
Start: 2020-08-24 | End: 2021-02-26

## 2020-08-31 ENCOUNTER — TELEPHONE (OUTPATIENT)
Dept: FAMILY MEDICINE CLINIC | Facility: CLINIC | Age: 70
End: 2020-08-31

## 2020-08-31 NOTE — TELEPHONE ENCOUNTER
PATIENT CALLED TO REQUEST SOME SAMPLES OF FARXIGA 10MG.  PLEASE CALL IF THERE IS ANY SHE CAN COME .    [phone]

## 2020-10-06 ENCOUNTER — TELEPHONE (OUTPATIENT)
Dept: FAMILY MEDICINE CLINIC | Facility: CLINIC | Age: 70
End: 2020-10-06

## 2020-10-22 RX ORDER — LISINOPRIL 10 MG/1
TABLET ORAL
Qty: 90 TABLET | Refills: 0 | Status: SHIPPED | OUTPATIENT
Start: 2020-10-22 | End: 2020-10-25

## 2020-10-25 RX ORDER — LISINOPRIL 10 MG/1
TABLET ORAL
Qty: 90 TABLET | Refills: 0 | Status: SHIPPED | OUTPATIENT
Start: 2020-10-25 | End: 2021-04-22

## 2020-11-12 ENCOUNTER — TELEPHONE (OUTPATIENT)
Dept: FAMILY MEDICINE CLINIC | Facility: CLINIC | Age: 70
End: 2020-11-12

## 2020-11-12 NOTE — TELEPHONE ENCOUNTER
PATIENT IS CALLING TO SEE IF THERE OFFICE HAS SAMPLES ON MEDICATION  FARXIGA 10MG    PLEASE CONTACT PATIENT @[phone]

## 2020-11-21 DIAGNOSIS — E11.9 TYPE 2 DIABETES MELLITUS WITHOUT COMPLICATION, WITHOUT LONG-TERM CURRENT USE OF INSULIN (HCC): ICD-10-CM

## 2020-11-25 ENCOUNTER — TELEPHONE (OUTPATIENT)
Dept: FAMILY MEDICINE CLINIC | Facility: CLINIC | Age: 70
End: 2020-11-25

## 2020-11-25 RX ORDER — LIRAGLUTIDE 6 MG/ML
INJECTION SUBCUTANEOUS
Qty: 3 PEN | Refills: 2 | Status: SHIPPED | OUTPATIENT
Start: 2020-11-25 | End: 2021-02-18

## 2020-11-25 NOTE — TELEPHONE ENCOUNTER
PATIENT CALLED REQUESTING VARCEGA 10 MG SAMPLES    PLEASE CB AND LVM REGARDING SAMPLES    CB#: [phone]

## 2020-12-04 DIAGNOSIS — E78.2 MIXED HYPERLIPIDEMIA: ICD-10-CM

## 2020-12-04 DIAGNOSIS — E11.9 TYPE 2 DIABETES MELLITUS WITHOUT COMPLICATION, WITHOUT LONG-TERM CURRENT USE OF INSULIN (HCC): Primary | ICD-10-CM

## 2020-12-07 ENCOUNTER — LAB (OUTPATIENT)
Dept: FAMILY MEDICINE CLINIC | Facility: CLINIC | Age: 70
End: 2020-12-07

## 2020-12-07 DIAGNOSIS — E78.2 MIXED HYPERLIPIDEMIA: ICD-10-CM

## 2020-12-07 DIAGNOSIS — E11.9 TYPE 2 DIABETES MELLITUS WITHOUT COMPLICATION, WITHOUT LONG-TERM CURRENT USE OF INSULIN (HCC): ICD-10-CM

## 2020-12-07 LAB
ALBUMIN SERPL-MCNC: 4.6 G/DL (ref 3.5–5.2)
ALBUMIN/GLOB SERPL: 1.8 G/DL
ALP SERPL-CCNC: 46 U/L (ref 39–117)
ALT SERPL W P-5'-P-CCNC: 10 U/L (ref 1–33)
ANION GAP SERPL CALCULATED.3IONS-SCNC: 9.5 MMOL/L (ref 5–15)
AST SERPL-CCNC: 17 U/L (ref 1–32)
BASOPHILS # BLD AUTO: 0.04 10*3/MM3 (ref 0–0.2)
BASOPHILS NFR BLD AUTO: 0.7 % (ref 0–1.5)
BILIRUB SERPL-MCNC: 0.2 MG/DL (ref 0–1.2)
BUN SERPL-MCNC: 13 MG/DL (ref 8–23)
BUN/CREAT SERPL: 19.7 (ref 7–25)
CALCIUM SPEC-SCNC: 9.4 MG/DL (ref 8.6–10.5)
CHLORIDE SERPL-SCNC: 100 MMOL/L (ref 98–107)
CHOLEST SERPL-MCNC: 170 MG/DL (ref 0–200)
CO2 SERPL-SCNC: 27.5 MMOL/L (ref 22–29)
CREAT SERPL-MCNC: 0.66 MG/DL (ref 0.57–1)
DEPRECATED RDW RBC AUTO: 42.1 FL (ref 37–54)
EOSINOPHIL # BLD AUTO: 0.16 10*3/MM3 (ref 0–0.4)
EOSINOPHIL NFR BLD AUTO: 2.6 % (ref 0.3–6.2)
ERYTHROCYTE [DISTWIDTH] IN BLOOD BY AUTOMATED COUNT: 13.7 % (ref 12.3–15.4)
GFR SERPL CREATININE-BSD FRML MDRD: 89 ML/MIN/1.73
GLOBULIN UR ELPH-MCNC: 2.5 GM/DL
GLUCOSE SERPL-MCNC: 131 MG/DL (ref 65–99)
HBA1C MFR BLD: 7.8 % (ref 3.5–5.6)
HCT VFR BLD AUTO: 39.3 % (ref 34–46.6)
HDLC SERPL-MCNC: 62 MG/DL (ref 40–60)
HGB BLD-MCNC: 13 G/DL (ref 12–15.9)
IMM GRANULOCYTES # BLD AUTO: 0.02 10*3/MM3 (ref 0–0.05)
IMM GRANULOCYTES NFR BLD AUTO: 0.3 % (ref 0–0.5)
LDLC SERPL CALC-MCNC: 88 MG/DL (ref 0–100)
LDLC/HDLC SERPL: 1.37 {RATIO}
LYMPHOCYTES # BLD AUTO: 2.29 10*3/MM3 (ref 0.7–3.1)
LYMPHOCYTES NFR BLD AUTO: 37.4 % (ref 19.6–45.3)
MCH RBC QN AUTO: 28.3 PG (ref 26.6–33)
MCHC RBC AUTO-ENTMCNC: 33.1 G/DL (ref 31.5–35.7)
MCV RBC AUTO: 85.4 FL (ref 79–97)
MONOCYTES # BLD AUTO: 0.51 10*3/MM3 (ref 0.1–0.9)
MONOCYTES NFR BLD AUTO: 8.3 % (ref 5–12)
NEUTROPHILS NFR BLD AUTO: 3.11 10*3/MM3 (ref 1.7–7)
NEUTROPHILS NFR BLD AUTO: 50.7 % (ref 42.7–76)
NRBC BLD AUTO-RTO: 0 /100 WBC (ref 0–0.2)
PLATELET # BLD AUTO: 357 10*3/MM3 (ref 140–450)
PMV BLD AUTO: 10.9 FL (ref 6–12)
POTASSIUM SERPL-SCNC: 4.7 MMOL/L (ref 3.5–5.2)
PROT SERPL-MCNC: 7.1 G/DL (ref 6–8.5)
RBC # BLD AUTO: 4.6 10*6/MM3 (ref 3.77–5.28)
SODIUM SERPL-SCNC: 137 MMOL/L (ref 136–145)
TRIGL SERPL-MCNC: 114 MG/DL (ref 0–150)
VLDLC SERPL-MCNC: 20 MG/DL (ref 5–40)
WBC # BLD AUTO: 6.13 10*3/MM3 (ref 3.4–10.8)

## 2020-12-07 PROCEDURE — 85025 COMPLETE CBC W/AUTO DIFF WBC: CPT | Performed by: FAMILY MEDICINE

## 2020-12-07 PROCEDURE — 80053 COMPREHEN METABOLIC PANEL: CPT | Performed by: FAMILY MEDICINE

## 2020-12-07 PROCEDURE — 80061 LIPID PANEL: CPT | Performed by: FAMILY MEDICINE

## 2020-12-07 PROCEDURE — 83036 HEMOGLOBIN GLYCOSYLATED A1C: CPT | Performed by: FAMILY MEDICINE

## 2020-12-08 RX ORDER — DAPAGLIFLOZIN 10 MG/1
10 TABLET, FILM COATED ORAL DAILY
Qty: 30 TABLET | Refills: 6 | Status: SHIPPED | OUTPATIENT
Start: 2020-12-08 | End: 2021-10-25

## 2020-12-08 RX ORDER — LORATADINE 10 MG/1
TABLET ORAL
COMMUNITY

## 2020-12-08 RX ORDER — DAPAGLIFLOZIN 5 MG/1
10 TABLET, FILM COATED ORAL DAILY
Qty: 30 TABLET | Refills: 1 | Status: SHIPPED | OUTPATIENT
Start: 2020-12-08 | End: 2020-12-08

## 2020-12-08 NOTE — PROGRESS NOTES
Tell Janna to increase her Farxiga to 10 mg a day.  When she runs out let us know and we will call in a 10 mg tablet for her

## 2020-12-14 ENCOUNTER — OFFICE VISIT (OUTPATIENT)
Dept: FAMILY MEDICINE CLINIC | Facility: CLINIC | Age: 70
End: 2020-12-14

## 2020-12-14 ENCOUNTER — LAB (OUTPATIENT)
Dept: FAMILY MEDICINE CLINIC | Facility: CLINIC | Age: 70
End: 2020-12-14

## 2020-12-14 VITALS
TEMPERATURE: 97.3 F | DIASTOLIC BLOOD PRESSURE: 80 MMHG | RESPIRATION RATE: 16 BRPM | HEART RATE: 94 BPM | SYSTOLIC BLOOD PRESSURE: 132 MMHG | OXYGEN SATURATION: 98 % | BODY MASS INDEX: 23.19 KG/M2 | WEIGHT: 126 LBS | HEIGHT: 62 IN

## 2020-12-14 DIAGNOSIS — E11.9 TYPE 2 DIABETES MELLITUS WITHOUT COMPLICATION, WITHOUT LONG-TERM CURRENT USE OF INSULIN (HCC): Primary | ICD-10-CM

## 2020-12-14 LAB — ALBUMIN UR-MCNC: <1.2 MG/DL

## 2020-12-14 PROCEDURE — 99213 OFFICE O/P EST LOW 20 MIN: CPT | Performed by: FAMILY MEDICINE

## 2020-12-14 PROCEDURE — 82043 UR ALBUMIN QUANTITATIVE: CPT | Performed by: FAMILY MEDICINE

## 2021-02-18 DIAGNOSIS — E11.9 TYPE 2 DIABETES MELLITUS WITHOUT COMPLICATION, WITHOUT LONG-TERM CURRENT USE OF INSULIN (HCC): ICD-10-CM

## 2021-02-18 RX ORDER — LIRAGLUTIDE 6 MG/ML
INJECTION SUBCUTANEOUS
Qty: 3 PEN | Refills: 11 | Status: SHIPPED | OUTPATIENT
Start: 2021-02-18 | End: 2021-09-20

## 2021-02-24 ENCOUNTER — TELEPHONE (OUTPATIENT)
Dept: FAMILY MEDICINE CLINIC | Facility: CLINIC | Age: 71
End: 2021-02-24

## 2021-02-24 DIAGNOSIS — Z12.31 ENCOUNTER FOR SCREENING MAMMOGRAM FOR MALIGNANT NEOPLASM OF BREAST: Primary | ICD-10-CM

## 2021-02-24 NOTE — TELEPHONE ENCOUNTER
PATIENT CALLED AND STATED SHE NEEDS A REFERRAL FOR HER MAMMOGRAM AT [facility] ON [address]. SHE NEEDS THIS TO BE SCHEDULE, HER YEARLY DATE IS 2/25/21    PLEASE ADVISE    CALL BACK -[phone]

## 2021-02-26 RX ORDER — GLIPIZIDE 10 MG/1
TABLET ORAL
Qty: 180 TABLET | Refills: 0 | Status: SHIPPED | OUTPATIENT
Start: 2021-02-26 | End: 2021-05-19

## 2021-02-26 RX ORDER — PRAVASTATIN SODIUM 20 MG
TABLET ORAL
Qty: 90 TABLET | Refills: 0 | Status: SHIPPED | OUTPATIENT
Start: 2021-02-26 | End: 2021-05-19

## 2021-04-13 DIAGNOSIS — Z12.31 ENCOUNTER FOR SCREENING MAMMOGRAM FOR MALIGNANT NEOPLASM OF BREAST: ICD-10-CM

## 2021-04-22 RX ORDER — LISINOPRIL 10 MG/1
TABLET ORAL
Qty: 90 TABLET | Refills: 0 | Status: SHIPPED | OUTPATIENT
Start: 2021-04-22 | End: 2021-07-27

## 2021-05-19 RX ORDER — GLIPIZIDE 10 MG/1
TABLET ORAL
Qty: 180 TABLET | Refills: 0 | Status: SHIPPED | OUTPATIENT
Start: 2021-05-19 | End: 2021-08-26 | Stop reason: SDUPTHER

## 2021-05-19 RX ORDER — PRAVASTATIN SODIUM 20 MG
TABLET ORAL
Qty: 90 TABLET | Refills: 0 | Status: SHIPPED | OUTPATIENT
Start: 2021-05-19 | End: 2021-08-27

## 2021-07-02 DIAGNOSIS — E11.9 TYPE 2 DIABETES MELLITUS WITHOUT COMPLICATION, WITHOUT LONG-TERM CURRENT USE OF INSULIN (HCC): Primary | ICD-10-CM

## 2021-07-02 DIAGNOSIS — I10 ESSENTIAL HYPERTENSION: ICD-10-CM

## 2021-07-02 DIAGNOSIS — Z11.59 ENCOUNTER FOR HEPATITIS C SCREENING TEST FOR LOW RISK PATIENT: ICD-10-CM

## 2021-07-02 DIAGNOSIS — E78.2 MIXED HYPERLIPIDEMIA: ICD-10-CM

## 2021-07-06 ENCOUNTER — LAB (OUTPATIENT)
Dept: FAMILY MEDICINE CLINIC | Facility: CLINIC | Age: 71
End: 2021-07-06

## 2021-07-06 DIAGNOSIS — E11.9 TYPE 2 DIABETES MELLITUS WITHOUT COMPLICATION, WITHOUT LONG-TERM CURRENT USE OF INSULIN (HCC): ICD-10-CM

## 2021-07-06 DIAGNOSIS — E78.2 MIXED HYPERLIPIDEMIA: ICD-10-CM

## 2021-07-06 DIAGNOSIS — Z11.59 ENCOUNTER FOR HEPATITIS C SCREENING TEST FOR LOW RISK PATIENT: ICD-10-CM

## 2021-07-06 DIAGNOSIS — I10 ESSENTIAL HYPERTENSION: ICD-10-CM

## 2021-07-06 LAB
ALBUMIN SERPL-MCNC: 4.3 G/DL (ref 3.5–5.2)
ALBUMIN UR-MCNC: <1.2 MG/DL
ALBUMIN/GLOB SERPL: 1.8 G/DL
ALP SERPL-CCNC: 43 U/L (ref 39–117)
ALT SERPL W P-5'-P-CCNC: 20 U/L (ref 1–33)
ANION GAP SERPL CALCULATED.3IONS-SCNC: 10.7 MMOL/L (ref 5–15)
AST SERPL-CCNC: 19 U/L (ref 1–32)
BASOPHILS # BLD AUTO: 0.04 10*3/MM3 (ref 0–0.2)
BASOPHILS NFR BLD AUTO: 0.6 % (ref 0–1.5)
BILIRUB SERPL-MCNC: 0.2 MG/DL (ref 0–1.2)
BILIRUB UR QL STRIP: NEGATIVE
BUN SERPL-MCNC: 19 MG/DL (ref 8–23)
BUN/CREAT SERPL: 28.4 (ref 7–25)
CALCIUM SPEC-SCNC: 8.9 MG/DL (ref 8.6–10.5)
CHLORIDE SERPL-SCNC: 101 MMOL/L (ref 98–107)
CHOLEST SERPL-MCNC: 163 MG/DL (ref 0–200)
CLARITY UR: CLEAR
CO2 SERPL-SCNC: 26.3 MMOL/L (ref 22–29)
COLOR UR: YELLOW
CREAT SERPL-MCNC: 0.67 MG/DL (ref 0.57–1)
DEPRECATED RDW RBC AUTO: 42.6 FL (ref 37–54)
EOSINOPHIL # BLD AUTO: 0.25 10*3/MM3 (ref 0–0.4)
EOSINOPHIL NFR BLD AUTO: 3.7 % (ref 0.3–6.2)
ERYTHROCYTE [DISTWIDTH] IN BLOOD BY AUTOMATED COUNT: 13.6 % (ref 12.3–15.4)
GFR SERPL CREATININE-BSD FRML MDRD: 87 ML/MIN/1.73
GLOBULIN UR ELPH-MCNC: 2.4 GM/DL
GLUCOSE SERPL-MCNC: 153 MG/DL (ref 65–99)
GLUCOSE UR STRIP-MCNC: ABNORMAL MG/DL
HBA1C MFR BLD: 8.2 % (ref 3.5–5.6)
HCT VFR BLD AUTO: 38.6 % (ref 34–46.6)
HCV AB SER DONR QL: NORMAL
HDLC SERPL-MCNC: 60 MG/DL (ref 40–60)
HGB BLD-MCNC: 12.3 G/DL (ref 12–15.9)
HGB UR QL STRIP.AUTO: NEGATIVE
IMM GRANULOCYTES # BLD AUTO: 0.03 10*3/MM3 (ref 0–0.05)
IMM GRANULOCYTES NFR BLD AUTO: 0.4 % (ref 0–0.5)
KETONES UR QL STRIP: NEGATIVE
LDLC SERPL CALC-MCNC: 80 MG/DL (ref 0–100)
LDLC/HDLC SERPL: 1.29 {RATIO}
LEUKOCYTE ESTERASE UR QL STRIP.AUTO: NEGATIVE
LYMPHOCYTES # BLD AUTO: 2.68 10*3/MM3 (ref 0.7–3.1)
LYMPHOCYTES NFR BLD AUTO: 39.5 % (ref 19.6–45.3)
MCH RBC QN AUTO: 27.5 PG (ref 26.6–33)
MCHC RBC AUTO-ENTMCNC: 31.9 G/DL (ref 31.5–35.7)
MCV RBC AUTO: 86.4 FL (ref 79–97)
MONOCYTES # BLD AUTO: 0.68 10*3/MM3 (ref 0.1–0.9)
MONOCYTES NFR BLD AUTO: 10 % (ref 5–12)
NEUTROPHILS NFR BLD AUTO: 3.11 10*3/MM3 (ref 1.7–7)
NEUTROPHILS NFR BLD AUTO: 45.8 % (ref 42.7–76)
NITRITE UR QL STRIP: NEGATIVE
NRBC BLD AUTO-RTO: 0 /100 WBC (ref 0–0.2)
PH UR STRIP.AUTO: 7 [PH] (ref 5–8)
PLATELET # BLD AUTO: 305 10*3/MM3 (ref 140–450)
PMV BLD AUTO: 11 FL (ref 6–12)
POTASSIUM SERPL-SCNC: 5.2 MMOL/L (ref 3.5–5.2)
PROT SERPL-MCNC: 6.7 G/DL (ref 6–8.5)
PROT UR QL STRIP: NEGATIVE
RBC # BLD AUTO: 4.47 10*6/MM3 (ref 3.77–5.28)
SODIUM SERPL-SCNC: 138 MMOL/L (ref 136–145)
SP GR UR STRIP: >=1.03 (ref 1–1.03)
TRIGL SERPL-MCNC: 129 MG/DL (ref 0–150)
TSH SERPL DL<=0.05 MIU/L-ACNC: 1.83 UIU/ML (ref 0.27–4.2)
UROBILINOGEN UR QL STRIP: ABNORMAL
VLDLC SERPL-MCNC: 23 MG/DL (ref 5–40)
WBC # BLD AUTO: 6.79 10*3/MM3 (ref 3.4–10.8)

## 2021-07-06 PROCEDURE — 82043 UR ALBUMIN QUANTITATIVE: CPT | Performed by: FAMILY MEDICINE

## 2021-07-06 PROCEDURE — 86803 HEPATITIS C AB TEST: CPT | Performed by: FAMILY MEDICINE

## 2021-07-06 PROCEDURE — 36415 COLL VENOUS BLD VENIPUNCTURE: CPT

## 2021-07-06 PROCEDURE — 85025 COMPLETE CBC W/AUTO DIFF WBC: CPT | Performed by: FAMILY MEDICINE

## 2021-07-06 PROCEDURE — 80061 LIPID PANEL: CPT | Performed by: FAMILY MEDICINE

## 2021-07-06 PROCEDURE — 84443 ASSAY THYROID STIM HORMONE: CPT | Performed by: FAMILY MEDICINE

## 2021-07-06 PROCEDURE — 81003 URINALYSIS AUTO W/O SCOPE: CPT | Performed by: FAMILY MEDICINE

## 2021-07-06 PROCEDURE — 83036 HEMOGLOBIN GLYCOSYLATED A1C: CPT | Performed by: FAMILY MEDICINE

## 2021-07-06 PROCEDURE — 80053 COMPREHEN METABOLIC PANEL: CPT | Performed by: FAMILY MEDICINE

## 2021-07-09 ENCOUNTER — OFFICE VISIT (OUTPATIENT)
Dept: FAMILY MEDICINE CLINIC | Facility: CLINIC | Age: 71
End: 2021-07-09

## 2021-07-09 VITALS
RESPIRATION RATE: 16 BRPM | SYSTOLIC BLOOD PRESSURE: 120 MMHG | BODY MASS INDEX: 23.19 KG/M2 | HEART RATE: 87 BPM | DIASTOLIC BLOOD PRESSURE: 62 MMHG | WEIGHT: 126 LBS | HEIGHT: 62 IN | OXYGEN SATURATION: 96 %

## 2021-07-09 DIAGNOSIS — I10 ESSENTIAL HYPERTENSION: ICD-10-CM

## 2021-07-09 DIAGNOSIS — E78.2 MIXED HYPERLIPIDEMIA: ICD-10-CM

## 2021-07-09 DIAGNOSIS — E11.9 TYPE 2 DIABETES MELLITUS WITHOUT COMPLICATION, WITHOUT LONG-TERM CURRENT USE OF INSULIN (HCC): Primary | ICD-10-CM

## 2021-07-09 PROCEDURE — G0439 PPPS, SUBSEQ VISIT: HCPCS | Performed by: FAMILY MEDICINE

## 2021-07-09 PROCEDURE — 99213 OFFICE O/P EST LOW 20 MIN: CPT | Performed by: FAMILY MEDICINE

## 2021-07-09 NOTE — PROGRESS NOTES
"Chief Complaint  Medicare Wellness-subsequent    Subjective          Janna Flowers presents to [facility]  Janna is a 71-year-old white female who is here today to discuss her diabetes.  She is a type II diabetic who is on Metformin, sulfonylurea, a GLP-1 agonist, and SGLT2  Inhib.   Despite these medications and following her diet and exercising her A1c is gone up to 8%.  Rest of her examination was normal review of systems was normal.  I am afraid that Janna is probably converting from a type II diabetic and to a type 1.5 diabetes were sure pancreas is no longer producing any insulin.      Objective   Vital Signs:   /62   Pulse 87   Resp 16   Ht 157.5 cm (62\")   Wt 57.2 kg (126 lb)   SpO2 96%   BMI 23.05 kg/m²     Physical Exam  Constitutional:       Appearance: Normal appearance. She is well-developed and normal weight.   HENT:      Head: Normocephalic and atraumatic.      Right Ear: Tympanic membrane, ear canal and external ear normal.      Left Ear: Tympanic membrane, ear canal and external ear normal.      Nose: Nose normal.      Mouth/Throat:      Mouth: Mucous membranes are moist.      Pharynx: Oropharynx is clear. No oropharyngeal exudate.   Eyes:      Extraocular Movements: Extraocular movements intact.      Conjunctiva/sclera: Conjunctivae normal.      Pupils: Pupils are equal, round, and reactive to light.   Cardiovascular:      Rate and Rhythm: Normal rate and regular rhythm.      Pulses: Normal pulses.      Heart sounds: Normal heart sounds.   Pulmonary:      Effort: Pulmonary effort is normal.      Breath sounds: Normal breath sounds.   Abdominal:      General: Bowel sounds are normal.      Palpations: Abdomen is soft.   Musculoskeletal:         General: Normal range of motion.      Cervical back: Normal range of motion and neck supple.   Skin:     General: Skin is warm and dry.   Neurological:      General: No focal deficit present.      Mental " Status: She is alert and oriented to person, place, and time. Mental status is at baseline.   Psychiatric:         Mood and Affect: Mood normal.         Behavior: Behavior normal.         Thought Content: Thought content normal.         Judgment: Judgment normal.        Result Review :                 Assessment and Plan    Diagnoses and all orders for this visit:    1. Type 2 diabetes mellitus without complication, without long-term current use of insulin (CMS/[facility]) (Primary)  Assessment & Plan:  Patient's diabetes is not well controlled despite 4 medicines.  I am going to have her finish up her Victoza and then stop it and were going to start a long-acting low-dose basal insulin after that.  I think her pancreas is likely not producing any insulin so it makes some of these medications impotent.  I think if we start low-dose insulin and she continues her diet and exercise we should see improvement.      2. Essential hypertension  Assessment & Plan:  Patient's blood pressure today is excellent.  She should continue her current blood pressure meds      3. Mixed hyperlipidemia  Assessment & Plan:  Patient's the lipid panel is excellent.  She is on pravastatin and she should continue this medication        Follow Up   Return in about 2 months (around 9/9/2021) for Recheck--recheck in 8 weeks when she has been on insulin.  Patient was given instructions and counseling regarding her condition or for health maintenance advice. Please see specific information pulled into the AVS if appropriate.

## 2021-07-09 NOTE — ASSESSMENT & PLAN NOTE
Patient's the lipid panel is excellent.  She is on pravastatin and she should continue this medication

## 2021-07-26 ENCOUNTER — TELEPHONE (OUTPATIENT)
Dept: FAMILY MEDICINE CLINIC | Facility: CLINIC | Age: 71
End: 2021-07-26

## 2021-07-26 NOTE — TELEPHONE ENCOUNTER
Caller: Janna Flowers    Relationship: Self    Best call back number: [phone]    What medications are you currently taking:   Current Outpatient Medications on File Prior to Visit   Medication Sig Dispense Refill   • azelastine (OPTIVAR) 0.05 % ophthalmic solution      • Blood Glucose Monitoring Suppl (ACCU-CHEK AVINASH) device Needs to new machine  Dx e11.9     (any brand is ok ) 1 each 0   • Blood Glucose Monitoring Suppl (ACCU-CHEK COMPACT CARE KIT) kit ACCU-CHEK COMPACT PLUS CARE KIT     • Dapagliflozin Propanediol (Farxiga) 10 MG tablet Take 10 mg by mouth Daily for 30 days. 30 tablet 6   • glipizide (GLUCOTROL) 10 MG tablet TAKE ONE TABLET BY MOUTH TWICE A  tablet 0   • glucose blood (Accu-Chek Avinash) test strip Use bid  Dx e11.9 200 each 12   • lisinopril (PRINIVIL,ZESTRIL) 10 MG tablet TAKE ONE TABLET BY MOUTH DAILY 90 tablet 0   • loratadine (Claritin) 10 MG tablet      • metFORMIN (GLUCOPHAGE) 1000 MG tablet TAKE ONE TABLET BY MOUTH TWICE A  tablet 0   • pravastatin (PRAVACHOL) 20 MG tablet TAKE ONE TABLET BY MOUTH DAILY 90 tablet 0   • Victoza 18 MG/3ML solution pen-injector injection DIAL AND INJECT UNDER THE SKIN 1.8 MG DAILY 3 pen 11     No current facility-administered medications on file prior to visit.        When did you start taking these medications: 07/27/21, NOT STARTED YET    Which medication are you concerned about: DIANE    Who prescribed you this medication: DR. WOLFE    What are your concerns: SHE SAID SHE IS SUPPOSED TO START TAKING 100 UNITS A DAY TOMORROW, 07/27/21 AND SHE IS NOT SURE HOW TO DIAL UP THE CORRECT DOSE ON THIS PEN, SHE SAID IT WAS GIVEN TO HER A AS A SAMPLE    SHE IS ALSO WANTING TO KNOW HOW MANY UNITS ARE IN EACH PEN    AND SHE IS WANTING TO SEE IF SHE NEEDS TO CONTINUE TAKING OTHER INSULINS, SHE SAID INSURANCE SAID ANOTHER MEDICATION WAS AVAILABLE AT A LOWER COST     How long have you been taking these medications: N/A    How long have you had  these concerns: N/A

## 2021-07-26 NOTE — TELEPHONE ENCOUNTER
I spoke to Janna.  We carefully went over the Tresiba pen.  She is on the U1 100 pen and I want her to start on just 10 units a day.  We went over how to dial the pen in for 10 units.   She will be checking her sugars fasting in the morning and then again before her evening meal.  She will call me in a week to 10 days with a few days worth of readings and we will adjust her insulin then

## 2021-07-27 RX ORDER — BLOOD SUGAR DIAGNOSTIC
STRIP MISCELLANEOUS
Qty: 200 EACH | Refills: 5 | Status: SHIPPED | OUTPATIENT
Start: 2021-07-27 | End: 2022-08-26

## 2021-07-27 RX ORDER — LISINOPRIL 10 MG/1
TABLET ORAL
Qty: 90 TABLET | Refills: 0 | Status: SHIPPED | OUTPATIENT
Start: 2021-07-27 | End: 2021-10-25

## 2021-08-09 ENCOUNTER — TELEPHONE (OUTPATIENT)
Dept: FAMILY MEDICINE CLINIC | Facility: CLINIC | Age: 71
End: 2021-08-09

## 2021-08-09 NOTE — TELEPHONE ENCOUNTER
Caller: Janna Flowers    Relationship to patient: Self    Best call back number: [phone]    Patient is needing: PATIENT IS TAKING 10 UNITS OF TRISEBA     CALLED TO REPORT BLOOD GLUCOSE READINGS    07/27/21:  MORNING - 154  EVENING - 121     07/28/21  MORNING - 86  EVENING - 144    07/29/21  MORNING - 81  EVENING - 197    07/30/21  MORNING - 74  EVENING - 254 AFTER DINNER     07/31/21   MORNING - 73  EVENING - 138 BEFORE DINNER     08/01/21  MORNING - 59  EVENING - 135 BEFORE DINNER    08/02/21   MORNING - 95  EVENING - 219 BEFORE DINNER    08/03/21  MORNING - MORNING   EVENING - 161 BEFORE DINNER     08/04/21  MORNING - 87  EVENING - 82 BEFORE DINNER     08/05/21   MORNING - 68   EVENING - 284     08/06/21  MORNING - 83  EVENING - 261    08/07/21  MORNING - 102  EVENING - 200    08/08/21  MORNING - 87  EVENING - 154     08/09/21  MORNING - 88

## 2021-08-10 NOTE — TELEPHONE ENCOUNTER
Spoke to pt she has not had any sweats since starting insulin, she did with the victoza.    She will take the early readings and let you know.

## 2021-08-10 NOTE — TELEPHONE ENCOUNTER
Mariluz I wonder if Janna may be getting low early in the morning when she is still asleep and then rebounding high when she wakes up and checks her blood sugar. First ask her if she thinks she has sweating at night. Does she wake up wet  or does she feel like she may have sweat some during The night. Then asked her if she would mind setting the alarm for about 10 or 3 in the morning and getting up long enough just to check her sugars to see if she is getting low about that time. Anywhere from 3 to 5 AM would do fine. If she could do that once or twice that would be helpful for us to make sure she is not getting too low on the middle of the morning.

## 2021-08-11 NOTE — TELEPHONE ENCOUNTER
Caller: Janna Flowers    Relationship to patient: Self    Best call back number: [phone]    Patient is needing: PATIENT CALLED AND SAID SHE WOKE UP AT 4 AM TODAY AND CHECKED HER BLOOD GLUCOSE, IT WAS 83, SHE CHECKED IT AGAIN AT 6 AM AND IT WAS 80    SHE TOOK HER INSULIN SHOT AT 8 AM

## 2021-08-12 RX ORDER — PEN NEEDLE, DIABETIC 31 GX5/16"
1 NEEDLE, DISPOSABLE MISCELLANEOUS DAILY
Qty: 30 EACH | Refills: 11 | Status: SHIPPED | OUTPATIENT
Start: 2021-08-12 | End: 2022-08-26

## 2021-08-12 NOTE — TELEPHONE ENCOUNTER
Gave message to patient at 10:48am when she called in.  Needs an rx sent in for Tresiba 100 units - takes up to 10 units once daily AND will need the needles for that pen as well.

## 2021-08-12 NOTE — TELEPHONE ENCOUNTER
Mariluz tell Janna thank you for getting those readings for us.  Lets just stay on the same dose of insulin for now and see what her A1c does in a few more weeks.

## 2021-08-26 ENCOUNTER — TELEPHONE (OUTPATIENT)
Dept: FAMILY MEDICINE CLINIC | Facility: CLINIC | Age: 71
End: 2021-08-26

## 2021-08-26 NOTE — TELEPHONE ENCOUNTER
Relationship to patient: Self    Best call back number: [phone]     Patient is needing:     MS HE WOULD LIKE TO KNOW IF DR. WOLFE WOULD LIKE HER TO STAY ON GLIPIZIDE,SINCE SHE IS ON THE METFORMIN

## 2021-08-27 RX ORDER — PRAVASTATIN SODIUM 20 MG
TABLET ORAL
Qty: 90 TABLET | Refills: 0 | Status: SHIPPED | OUTPATIENT
Start: 2021-08-27 | End: 2021-11-22

## 2021-08-27 RX ORDER — GLIPIZIDE 10 MG/1
10 TABLET ORAL 2 TIMES DAILY
Qty: 180 TABLET | Refills: 3 | Status: SHIPPED | OUTPATIENT
Start: 2021-08-27 | End: 2022-07-11

## 2021-08-28 NOTE — TELEPHONE ENCOUNTER
Yes absolutely I want her on the glipizide and the metformin and  all the other diabetic meds  that have been ordered.

## 2021-08-30 DIAGNOSIS — E11.9 TYPE 2 DIABETES MELLITUS WITHOUT COMPLICATION, WITHOUT LONG-TERM CURRENT USE OF INSULIN (HCC): Primary | ICD-10-CM

## 2021-09-20 ENCOUNTER — OFFICE VISIT (OUTPATIENT)
Dept: FAMILY MEDICINE CLINIC | Facility: CLINIC | Age: 71
End: 2021-09-20

## 2021-09-20 ENCOUNTER — LAB (OUTPATIENT)
Dept: FAMILY MEDICINE CLINIC | Facility: CLINIC | Age: 71
End: 2021-09-20

## 2021-09-20 VITALS
RESPIRATION RATE: 16 BRPM | WEIGHT: 133 LBS | TEMPERATURE: 97.1 F | HEIGHT: 62 IN | BODY MASS INDEX: 24.48 KG/M2 | DIASTOLIC BLOOD PRESSURE: 80 MMHG | HEART RATE: 97 BPM | OXYGEN SATURATION: 98 % | SYSTOLIC BLOOD PRESSURE: 156 MMHG

## 2021-09-20 DIAGNOSIS — E11.9 TYPE 2 DIABETES MELLITUS WITHOUT COMPLICATION, WITHOUT LONG-TERM CURRENT USE OF INSULIN (HCC): Primary | ICD-10-CM

## 2021-09-20 DIAGNOSIS — H61.22 IMPACTED CERUMEN OF LEFT EAR: ICD-10-CM

## 2021-09-20 PROBLEM — J30.89 ENVIRONMENTAL AND SEASONAL ALLERGIES: Status: RESOLVED | Noted: 2019-07-23 | Resolved: 2021-09-20

## 2021-09-20 PROCEDURE — 99213 OFFICE O/P EST LOW 20 MIN: CPT | Performed by: FAMILY MEDICINE

## 2021-09-20 PROCEDURE — 36415 COLL VENOUS BLD VENIPUNCTURE: CPT | Performed by: FAMILY MEDICINE

## 2021-09-20 PROCEDURE — 83036 HEMOGLOBIN GLYCOSYLATED A1C: CPT | Performed by: FAMILY MEDICINE

## 2021-09-20 RX ORDER — INSULIN DEGLUDEC INJECTION 100 U/ML
INJECTION, SOLUTION SUBCUTANEOUS
COMMUNITY
End: 2022-01-27 | Stop reason: SDUPTHER

## 2021-09-20 NOTE — ASSESSMENT & PLAN NOTE
Patients FBS levels are excellent.    Evening sugars elevated.   We may need to use a 12hour insulin and split the dose.  Wait on A1c

## 2021-09-20 NOTE — PROGRESS NOTES
"Chief Complaint  Diabetes    Subjective          Janna Flowers presents to [facility]  For follow up on DM. Has questions on insulin and what sugars should read.  Her FBS are excellent.    Diabetes        Objective   Vital Signs:   /80 (BP Location: Right arm)   Pulse 97   Temp 97.1 °F (36.2 °C) (Infrared)   Resp 16   Ht 157.5 cm (62\")   Wt 60.3 kg (133 lb)   SpO2 98%   BMI 24.33 kg/m²     Physical Exam  Vitals reviewed.   Constitutional:       Appearance: Normal appearance. She is well-developed and normal weight.   HENT:      Head: Normocephalic and atraumatic.      Right Ear: Tympanic membrane, ear canal and external ear normal.      Left Ear: Tympanic membrane, ear canal and external ear normal.      Nose: Nose normal.      Mouth/Throat:      Mouth: Mucous membranes are moist.      Pharynx: Oropharynx is clear. No oropharyngeal exudate.   Eyes:      Extraocular Movements: Extraocular movements intact.      Conjunctiva/sclera: Conjunctivae normal.      Pupils: Pupils are equal, round, and reactive to light.   Cardiovascular:      Rate and Rhythm: Normal rate and regular rhythm.      Pulses: Normal pulses.      Heart sounds: Normal heart sounds.   Pulmonary:      Effort: Pulmonary effort is normal.      Breath sounds: Normal breath sounds.   Abdominal:      General: Bowel sounds are normal.      Palpations: Abdomen is soft.   Musculoskeletal:         General: Normal range of motion.      Cervical back: Normal range of motion and neck supple.   Skin:     General: Skin is warm and dry.   Neurological:      General: No focal deficit present.      Mental Status: She is alert and oriented to person, place, and time. Mental status is at baseline.   Psychiatric:         Mood and Affect: Mood normal.         Behavior: Behavior normal.         Thought Content: Thought content normal.         Judgment: Judgment normal.        Result Review :                 Assessment and " Plan    Diagnoses and all orders for this visit:    1. Type 2 diabetes mellitus without complication, without long-term current use of insulin (CMS/[facility]) (Primary)  Assessment & Plan:  Patients FBS levels are excellent.    Evening sugars elevated.   We may need to use a 12hour insulin and split the dose.  Wait on A1c      2. Impacted cerumen of left ear      Follow Up   Return in about 4 months (around 1/20/2022) for Recheck-DM with labs the week before.  Patient was given instructions and counseling regarding her condition or for health maintenance advice. Please see specific information pulled into the AVS if appropriate.

## 2021-09-21 LAB — HBA1C MFR BLD: 8.2 % (ref 3.5–5.6)

## 2021-09-21 NOTE — PROGRESS NOTES
Tell Janna to increase her insulin by 5 units.  I think that will take her up to 15 units every morning.  Call me with some readings in about 4 weeks (fasting blood sugar--4PM sugars)

## 2021-10-21 ENCOUNTER — TELEPHONE (OUTPATIENT)
Dept: FAMILY MEDICINE CLINIC | Facility: CLINIC | Age: 71
End: 2021-10-21

## 2021-10-21 NOTE — TELEPHONE ENCOUNTER
Caller: Janna Flowers    Relationship to patient: Self    Best call back number: [phone]    PATIENT CALLED TO REPORT FOLLOWING BLOOD GLUCOSE READINGS    September 28TH-October 21ST    AM PM  102     177  103     200  637     153  653 196  114 73  81 150  78 144  64 133  68 135  89 175  92 168  90 --  104 119  67 188  82 174  63 222  80 197  87 92  86 119  86 67  93 205  63 97  69 150  100 216  76 154  96 63  76 83  59 201  81 216

## 2021-10-25 RX ORDER — LISINOPRIL 10 MG/1
TABLET ORAL
Qty: 90 TABLET | Refills: 0 | Status: SHIPPED | OUTPATIENT
Start: 2021-10-25 | End: 2022-01-19

## 2021-10-25 RX ORDER — DAPAGLIFLOZIN 10 MG/1
TABLET, FILM COATED ORAL
Qty: 30 TABLET | Refills: 6 | Status: SHIPPED | OUTPATIENT
Start: 2021-10-25 | End: 2022-06-09

## 2021-11-21 DIAGNOSIS — E11.9 TYPE 2 DIABETES MELLITUS WITHOUT COMPLICATION, WITHOUT LONG-TERM CURRENT USE OF INSULIN (HCC): ICD-10-CM

## 2021-11-22 RX ORDER — PRAVASTATIN SODIUM 20 MG
TABLET ORAL
Qty: 90 TABLET | Refills: 0 | Status: SHIPPED | OUTPATIENT
Start: 2021-11-22 | End: 2022-02-17

## 2022-01-11 DIAGNOSIS — E11.9 TYPE 2 DIABETES MELLITUS WITHOUT COMPLICATION, WITHOUT LONG-TERM CURRENT USE OF INSULIN: Primary | ICD-10-CM

## 2022-01-13 ENCOUNTER — LAB (OUTPATIENT)
Dept: FAMILY MEDICINE CLINIC | Facility: CLINIC | Age: 72
End: 2022-01-13

## 2022-01-13 DIAGNOSIS — E11.9 TYPE 2 DIABETES MELLITUS WITHOUT COMPLICATION, WITHOUT LONG-TERM CURRENT USE OF INSULIN: ICD-10-CM

## 2022-01-13 LAB
ALBUMIN SERPL-MCNC: 4.6 G/DL (ref 3.5–5.2)
ALBUMIN/GLOB SERPL: 1.6 G/DL
ALP SERPL-CCNC: 43 U/L (ref 39–117)
ALT SERPL W P-5'-P-CCNC: 13 U/L (ref 1–33)
ANION GAP SERPL CALCULATED.3IONS-SCNC: 11.1 MMOL/L (ref 5–15)
AST SERPL-CCNC: 22 U/L (ref 1–32)
BILIRUB SERPL-MCNC: 0.3 MG/DL (ref 0–1.2)
BUN SERPL-MCNC: 16 MG/DL (ref 8–23)
BUN/CREAT SERPL: 20.8 (ref 7–25)
CALCIUM SPEC-SCNC: 9.5 MG/DL (ref 8.6–10.5)
CHLORIDE SERPL-SCNC: 102 MMOL/L (ref 98–107)
CO2 SERPL-SCNC: 25.9 MMOL/L (ref 22–29)
CREAT SERPL-MCNC: 0.77 MG/DL (ref 0.57–1)
GFR SERPL CREATININE-BSD FRML MDRD: 74 ML/MIN/1.73
GLOBULIN UR ELPH-MCNC: 2.8 GM/DL
GLUCOSE SERPL-MCNC: 113 MG/DL (ref 65–99)
HBA1C MFR BLD: 7.6 % (ref 3.5–5.6)
POTASSIUM SERPL-SCNC: 4.8 MMOL/L (ref 3.5–5.2)
PROT SERPL-MCNC: 7.4 G/DL (ref 6–8.5)
SODIUM SERPL-SCNC: 139 MMOL/L (ref 136–145)

## 2022-01-13 PROCEDURE — 36415 COLL VENOUS BLD VENIPUNCTURE: CPT

## 2022-01-13 PROCEDURE — 83036 HEMOGLOBIN GLYCOSYLATED A1C: CPT | Performed by: FAMILY MEDICINE

## 2022-01-13 PROCEDURE — 80053 COMPREHEN METABOLIC PANEL: CPT | Performed by: FAMILY MEDICINE

## 2022-01-19 RX ORDER — LISINOPRIL 10 MG/1
TABLET ORAL
Qty: 90 TABLET | Refills: 0 | Status: SHIPPED | OUTPATIENT
Start: 2022-01-19 | End: 2022-04-20

## 2022-01-27 ENCOUNTER — OFFICE VISIT (OUTPATIENT)
Dept: FAMILY MEDICINE CLINIC | Facility: CLINIC | Age: 72
End: 2022-01-27

## 2022-01-27 VITALS
RESPIRATION RATE: 18 BRPM | DIASTOLIC BLOOD PRESSURE: 76 MMHG | HEIGHT: 62 IN | OXYGEN SATURATION: 97 % | BODY MASS INDEX: 25.4 KG/M2 | WEIGHT: 138 LBS | SYSTOLIC BLOOD PRESSURE: 120 MMHG | HEART RATE: 88 BPM | TEMPERATURE: 97.3 F

## 2022-01-27 DIAGNOSIS — E11.9 TYPE 2 DIABETES MELLITUS WITHOUT COMPLICATION, WITHOUT LONG-TERM CURRENT USE OF INSULIN: Primary | ICD-10-CM

## 2022-01-27 DIAGNOSIS — E78.2 MIXED HYPERLIPIDEMIA: ICD-10-CM

## 2022-01-27 DIAGNOSIS — I10 ESSENTIAL HYPERTENSION: ICD-10-CM

## 2022-01-27 PROCEDURE — 99213 OFFICE O/P EST LOW 20 MIN: CPT | Performed by: FAMILY MEDICINE

## 2022-01-27 RX ORDER — INSULIN DEGLUDEC INJECTION 100 U/ML
15 INJECTION, SOLUTION SUBCUTANEOUS DAILY
Qty: 4.5 ML | Refills: 0 | Status: SHIPPED | OUTPATIENT
Start: 2022-01-27 | End: 2022-03-24 | Stop reason: SDUPTHER

## 2022-01-27 NOTE — PROGRESS NOTES
"Chief Complaint  Diabetes    Subjective          Janna Flowers presents to [facility]  History of Present Illness    The patient has consented to being recorded using [employer].    The patient presents today for a follow-up.    The patient states that she is taking 15 units of Tresiba once a day in the morning and 10 mg of Farxiga daily. She notes she has been checking her blood glucose once a day and it was 90 mg/dL last night, 01/26/2022. She notes she is interested in obtaining a Dexcom glucose monitor but is concerned about the cost. The patient reports bloating and states she has gained weight. She states she feels as if she does not eat anymore. She partakes in rowing exercises 5 days a week. She notes depression associated with this. Her most recent HbA1c was 7.6, which is decreased from a previous result of 8.2.     The patient states she will be due for a mammogram in 04/2022. She states that she received her COVID-19 vaccine. She received her shingles vaccine. The patient notes she will be due for a colonoscopy in 05/2022 as it will be 5 years since her last colonoscopy. She reports her last colonoscopy in 05/2017 revealed a small polyp. She states she thought did not have to obtain another colonoscopy for 10 years but her report instructed her to return in 5 years.     The patient states she is currently laid off from work. She remains active by exercising 5 days a week and babysitting her grandchildren.     The patient states she is scheduled for her wellness visit on 07/11/2022.     The patient reports a history of breast cancer in his mother and sister.    Objective   Vital Signs:   /76 (BP Location: Left arm, Patient Position: Sitting, Cuff Size: Adult)   Pulse 88   Temp 97.3 °F (36.3 °C) (Temporal)   Resp 18   Ht 157.5 cm (62\")   Wt 62.6 kg (138 lb)   SpO2 97%   BMI 25.24 kg/m²     Physical Exam  Constitutional:       Appearance: Normal appearance. She is " well-developed and normal weight.   HENT:      Head: Normocephalic and atraumatic.      Right Ear: Tympanic membrane, ear canal and external ear normal.      Left Ear: Tympanic membrane, ear canal and external ear normal.      Nose: Nose normal.      Mouth/Throat:      Mouth: Mucous membranes are moist.      Pharynx: Oropharynx is clear. No oropharyngeal exudate.   Eyes:      Extraocular Movements: Extraocular movements intact.      Conjunctiva/sclera: Conjunctivae normal.      Pupils: Pupils are equal, round, and reactive to light.   Cardiovascular:      Rate and Rhythm: Normal rate and regular rhythm.      Pulses: Normal pulses.      Heart sounds: Normal heart sounds.   Pulmonary:      Effort: Pulmonary effort is normal.      Breath sounds: Normal breath sounds.   Abdominal:      General: Bowel sounds are normal.      Palpations: Abdomen is soft.   Musculoskeletal:         General: Normal range of motion.      Cervical back: Normal range of motion and neck supple.   Skin:     General: Skin is warm and dry.   Neurological:      General: No focal deficit present.      Mental Status: She is alert and oriented to person, place, and time. Mental status is at baseline.   Psychiatric:         Mood and Affect: Mood normal.         Behavior: Behavior normal.         Thought Content: Thought content normal.         Judgment: Judgment normal.        Result Review :                 Assessment and Plan    Diagnoses and all orders for this visit:    1. Type 2 diabetes mellitus without complication, without long-term current use of insulin ([facility]) (Primary)        - The patient is taking Tresiba 15 units a day and Farxiga 10 mg a day. Her most recent HbA1c was 7.6 percent and we will repeat that again in approximately 4 months and see if it is not coming down even more. She follows her diet carefully and is taking her insulin appropriately. We will continue her on 15 units of Tresiba a day.    2. Essential hypertension         - The patient's blood pressure today was 120/76 mmHg and she will continue her lisinopril 10 mg a day.    3. Mixed hyperlipidemia        - We will recheck her lipid panel in approximately 3 months when we recheck her HbA1c. She is on pravastatin 20 mg at night and she tries to follow a low-carb diet as best she can. She has been doing well and her last lipid panel back in 07/2021 was actually very good with a total cholesterol of 163, triglycerides 129, HDL 60 and LDL 80.    Other orders  -     Insulin Degludec (Tresiba) 100 UNIT/ML solution injection; Inject 15 Units under the skin into the appropriate area as directed Daily for 30 days.  Dispense: 4.5 mL; Refill: 0        Follow Up Return in about 6 months (around 7/27/2022).  Patient was given instructions and counseling regarding her condition or for health maintenance advice. Please see specific information pulled into the AVS if appropriate.

## 2022-02-17 DIAGNOSIS — E11.9 TYPE 2 DIABETES MELLITUS WITHOUT COMPLICATION, WITHOUT LONG-TERM CURRENT USE OF INSULIN: ICD-10-CM

## 2022-02-17 RX ORDER — PRAVASTATIN SODIUM 20 MG
TABLET ORAL
Qty: 90 TABLET | Refills: 0 | Status: SHIPPED | OUTPATIENT
Start: 2022-02-17 | End: 2022-05-24

## 2022-03-24 ENCOUNTER — TELEPHONE (OUTPATIENT)
Dept: FAMILY MEDICINE CLINIC | Facility: CLINIC | Age: 72
End: 2022-03-24

## 2022-03-24 DIAGNOSIS — Z12.31 ENCOUNTER FOR SCREENING MAMMOGRAM FOR MALIGNANT NEOPLASM OF BREAST: Primary | ICD-10-CM

## 2022-03-24 RX ORDER — INSULIN DEGLUDEC INJECTION 100 U/ML
15 INJECTION, SOLUTION SUBCUTANEOUS DAILY
Qty: 4.5 ML | Refills: 6 | Status: SHIPPED | OUTPATIENT
Start: 2022-03-24 | End: 2022-03-24

## 2022-03-25 NOTE — TELEPHONE ENCOUNTER
Spoke with patient on the phone, she said they wouldn't have her needles until Monday so she would get it then

## 2022-04-13 DIAGNOSIS — Z12.31 ENCOUNTER FOR SCREENING MAMMOGRAM FOR MALIGNANT NEOPLASM OF BREAST: ICD-10-CM

## 2022-04-15 DIAGNOSIS — E11.9 TYPE 2 DIABETES MELLITUS WITHOUT COMPLICATION, WITHOUT LONG-TERM CURRENT USE OF INSULIN: Primary | ICD-10-CM

## 2022-04-18 ENCOUNTER — TELEPHONE (OUTPATIENT)
Dept: FAMILY MEDICINE CLINIC | Facility: CLINIC | Age: 72
End: 2022-04-18

## 2022-04-18 RX ORDER — MECLIZINE HYDROCHLORIDE 25 MG/1
25-50 TABLET ORAL EVERY 6 HOURS PRN
Qty: 60 TABLET | Refills: 1 | Status: SHIPPED | OUTPATIENT
Start: 2022-04-18 | End: 2022-04-19 | Stop reason: SDUPTHER

## 2022-04-18 NOTE — TELEPHONE ENCOUNTER
Caller: Janna Flowers    Relationship: Self    Best call back number: [phone]    What medication are you requesting: MECLAZINE FOR NAUSEA FROM VERTIGO    What are your current symptoms: NAUSEA FROM VERTIGO     How long have you been experiencing symptoms: 1-2 DAYS    Have you had these symptoms before:    [x] Yes  [] No    Have you been treated for these symptoms before:   [x] Yes  [] No    If a prescription is needed, what is your preferred [facility] and phone number[facility] [place] [place] [facility] [address] [phone] [facility] [phone]      Additional notes:

## 2022-04-19 RX ORDER — MECLIZINE HYDROCHLORIDE 25 MG/1
25-50 TABLET ORAL EVERY 6 HOURS PRN
Qty: 60 TABLET | Refills: 1 | Status: SHIPPED | OUTPATIENT
Start: 2022-04-19 | End: 2022-05-04

## 2022-04-19 NOTE — TELEPHONE ENCOUNTER
Caller: Janna Flowers    Relationship: Self    Best call back number:[phone]    PATIENT CALLED TO CHECK ON MEDICATION REQUEST. PLEASE UPDATE JANNA

## 2022-04-20 RX ORDER — LISINOPRIL 10 MG/1
TABLET ORAL
Qty: 90 TABLET | Refills: 0 | Status: SHIPPED | OUTPATIENT
Start: 2022-04-20 | End: 2022-07-21

## 2022-04-25 LAB — GLUCOSE BLDC GLUCOMTR-MCNC: 127 MG/DL (ref 70–105)

## 2022-04-25 PROCEDURE — 99282 EMERGENCY DEPT VISIT SF MDM: CPT

## 2022-04-25 PROCEDURE — 82962 GLUCOSE BLOOD TEST: CPT

## 2022-04-25 PROCEDURE — 96375 TX/PRO/DX INJ NEW DRUG ADDON: CPT

## 2022-04-25 PROCEDURE — 96374 THER/PROPH/DIAG INJ IV PUSH: CPT

## 2022-04-25 PROCEDURE — 99283 EMERGENCY DEPT VISIT LOW MDM: CPT

## 2022-04-26 ENCOUNTER — HOSPITAL ENCOUNTER (EMERGENCY)
Facility: HOSPITAL | Age: 72
Discharge: HOME | End: 2022-04-26
Attending: EMERGENCY MEDICINE | Admitting: EMERGENCY MEDICINE

## 2022-04-26 ENCOUNTER — LAB (OUTPATIENT)
Dept: FAMILY MEDICINE CLINIC | Facility: CLINIC | Age: 72
End: 2022-04-26

## 2022-04-26 ENCOUNTER — TELEPHONE (OUTPATIENT)
Dept: FAMILY MEDICINE CLINIC | Facility: CLINIC | Age: 72
End: 2022-04-26

## 2022-04-26 VITALS
HEIGHT: 62 IN | RESPIRATION RATE: 17 BRPM | DIASTOLIC BLOOD PRESSURE: 76 MMHG | SYSTOLIC BLOOD PRESSURE: 127 MMHG | WEIGHT: 136 LBS | HEART RATE: 68 BPM | TEMPERATURE: 98.1 F | OXYGEN SATURATION: 96 % | BODY MASS INDEX: 25.03 KG/M2

## 2022-04-26 DIAGNOSIS — R42 VERTIGO: Primary | ICD-10-CM

## 2022-04-26 DIAGNOSIS — R11.2 NAUSEA AND VOMITING, UNSPECIFIED VOMITING TYPE: ICD-10-CM

## 2022-04-26 DIAGNOSIS — E11.9 TYPE 2 DIABETES MELLITUS WITHOUT COMPLICATION, WITHOUT LONG-TERM CURRENT USE OF INSULIN: ICD-10-CM

## 2022-04-26 LAB
ALBUMIN SERPL-MCNC: 4.7 G/DL (ref 3.5–5.2)
ALBUMIN/GLOB SERPL: 1.4 G/DL
ALP SERPL-CCNC: 46 U/L (ref 39–117)
ALT SERPL W P-5'-P-CCNC: 16 U/L (ref 1–33)
ANION GAP SERPL CALCULATED.3IONS-SCNC: 15 MMOL/L (ref 5–15)
ANION GAP SERPL CALCULATED.3IONS-SCNC: 16.9 MMOL/L (ref 5–15)
AST SERPL-CCNC: 18 U/L (ref 1–32)
BASOPHILS # BLD AUTO: 0.04 10*3/MM3 (ref 0–0.2)
BASOPHILS # BLD AUTO: 0.1 10*3/MM3 (ref 0–0.2)
BASOPHILS NFR BLD AUTO: 0.4 % (ref 0–1.5)
BASOPHILS NFR BLD AUTO: 0.4 % (ref 0–1.5)
BILIRUB SERPL-MCNC: 0.5 MG/DL (ref 0–1.2)
BUN SERPL-MCNC: 20 MG/DL (ref 8–23)
BUN SERPL-MCNC: 25 MG/DL (ref 8–23)
BUN/CREAT SERPL: 23.8 (ref 7–25)
BUN/CREAT SERPL: 32.3 (ref 7–25)
CALCIUM SPEC-SCNC: 10.2 MG/DL (ref 8.6–10.5)
CALCIUM SPEC-SCNC: 9.7 MG/DL (ref 8.6–10.5)
CHLORIDE SERPL-SCNC: 97 MMOL/L (ref 98–107)
CHLORIDE SERPL-SCNC: 99 MMOL/L (ref 98–107)
CO2 SERPL-SCNC: 23 MMOL/L (ref 22–29)
CO2 SERPL-SCNC: 24.1 MMOL/L (ref 22–29)
CREAT SERPL-MCNC: 0.62 MG/DL (ref 0.57–1)
CREAT SERPL-MCNC: 1.05 MG/DL (ref 0.57–1)
DEPRECATED RDW RBC AUTO: 43.3 FL (ref 37–54)
DEPRECATED RDW RBC AUTO: 45.1 FL (ref 37–54)
EGFRCR SERPLBLD CKD-EPI 2021: 56.6 ML/MIN/1.73
EGFRCR SERPLBLD CKD-EPI 2021: 94.8 ML/MIN/1.73
EOSINOPHIL # BLD AUTO: 0 10*3/MM3 (ref 0–0.4)
EOSINOPHIL # BLD AUTO: 0.1 10*3/MM3 (ref 0–0.4)
EOSINOPHIL NFR BLD AUTO: 0.1 % (ref 0.3–6.2)
EOSINOPHIL NFR BLD AUTO: 1 % (ref 0.3–6.2)
ERYTHROCYTE [DISTWIDTH] IN BLOOD BY AUTOMATED COUNT: 14.3 % (ref 12.3–15.4)
ERYTHROCYTE [DISTWIDTH] IN BLOOD BY AUTOMATED COUNT: 15.5 % (ref 12.3–15.4)
GLOBULIN UR ELPH-MCNC: 3.3 GM/DL
GLUCOSE SERPL-MCNC: 152 MG/DL (ref 65–99)
GLUCOSE SERPL-MCNC: 160 MG/DL (ref 65–99)
HBA1C MFR BLD: 7.5 % (ref 3.5–5.6)
HCT VFR BLD AUTO: 45.2 % (ref 34–46.6)
HCT VFR BLD AUTO: 45.5 % (ref 34–46.6)
HGB BLD-MCNC: 14.3 G/DL (ref 12–15.9)
HGB BLD-MCNC: 14.4 G/DL (ref 12–15.9)
IMM GRANULOCYTES # BLD AUTO: 0.02 10*3/MM3 (ref 0–0.05)
IMM GRANULOCYTES NFR BLD AUTO: 0.2 % (ref 0–0.5)
LYMPHOCYTES # BLD AUTO: 2 10*3/MM3 (ref 0.7–3.1)
LYMPHOCYTES # BLD AUTO: 3.7 10*3/MM3 (ref 0.7–3.1)
LYMPHOCYTES NFR BLD AUTO: 14.7 % (ref 19.6–45.3)
LYMPHOCYTES NFR BLD AUTO: 38.7 % (ref 19.6–45.3)
MCH RBC QN AUTO: 26.2 PG (ref 26.6–33)
MCH RBC QN AUTO: 26.9 PG (ref 26.6–33)
MCHC RBC AUTO-ENTMCNC: 31.6 G/DL (ref 31.5–35.7)
MCHC RBC AUTO-ENTMCNC: 31.6 G/DL (ref 31.5–35.7)
MCV RBC AUTO: 83.1 FL (ref 79–97)
MCV RBC AUTO: 84.9 FL (ref 79–97)
MONOCYTES # BLD AUTO: 0.7 10*3/MM3 (ref 0.1–0.9)
MONOCYTES # BLD AUTO: 0.75 10*3/MM3 (ref 0.1–0.9)
MONOCYTES NFR BLD AUTO: 5.1 % (ref 5–12)
MONOCYTES NFR BLD AUTO: 7.8 % (ref 5–12)
NEUTROPHILS NFR BLD AUTO: 11 10*3/MM3 (ref 1.7–7)
NEUTROPHILS NFR BLD AUTO: 4.95 10*3/MM3 (ref 1.7–7)
NEUTROPHILS NFR BLD AUTO: 51.9 % (ref 42.7–76)
NEUTROPHILS NFR BLD AUTO: 79.7 % (ref 42.7–76)
NRBC BLD AUTO-RTO: 0 /100 WBC (ref 0–0.2)
NRBC BLD AUTO-RTO: 0 /100 WBC (ref 0–0.2)
PLATELET # BLD AUTO: 355 10*3/MM3 (ref 140–450)
PLATELET # BLD AUTO: 415 10*3/MM3 (ref 140–450)
PMV BLD AUTO: 11.1 FL (ref 6–12)
PMV BLD AUTO: 8.6 FL (ref 6–12)
POTASSIUM SERPL-SCNC: 4.5 MMOL/L (ref 3.5–5.2)
POTASSIUM SERPL-SCNC: 5.3 MMOL/L (ref 3.5–5.2)
PROT SERPL-MCNC: 8 G/DL (ref 6–8.5)
RBC # BLD AUTO: 5.36 10*6/MM3 (ref 3.77–5.28)
RBC # BLD AUTO: 5.43 10*6/MM3 (ref 3.77–5.28)
SODIUM SERPL-SCNC: 137 MMOL/L (ref 136–145)
SODIUM SERPL-SCNC: 138 MMOL/L (ref 136–145)
WBC NRBC COR # BLD: 13.8 10*3/MM3 (ref 3.4–10.8)
WBC NRBC COR # BLD: 9.56 10*3/MM3 (ref 3.4–10.8)

## 2022-04-26 PROCEDURE — 25010000002 ONDANSETRON PER 1 MG: Performed by: EMERGENCY MEDICINE

## 2022-04-26 PROCEDURE — 93005 ELECTROCARDIOGRAM TRACING: CPT | Performed by: EMERGENCY MEDICINE

## 2022-04-26 PROCEDURE — 36415 COLL VENOUS BLD VENIPUNCTURE: CPT

## 2022-04-26 PROCEDURE — 80053 COMPREHEN METABOLIC PANEL: CPT | Performed by: EMERGENCY MEDICINE

## 2022-04-26 PROCEDURE — 85025 COMPLETE CBC W/AUTO DIFF WBC: CPT | Performed by: FAMILY MEDICINE

## 2022-04-26 PROCEDURE — 96375 TX/PRO/DX INJ NEW DRUG ADDON: CPT

## 2022-04-26 PROCEDURE — 96374 THER/PROPH/DIAG INJ IV PUSH: CPT

## 2022-04-26 PROCEDURE — 85025 COMPLETE CBC W/AUTO DIFF WBC: CPT | Performed by: EMERGENCY MEDICINE

## 2022-04-26 PROCEDURE — 25010000002 LORAZEPAM PER 2 MG: Performed by: EMERGENCY MEDICINE

## 2022-04-26 PROCEDURE — 83036 HEMOGLOBIN GLYCOSYLATED A1C: CPT | Performed by: FAMILY MEDICINE

## 2022-04-26 RX ORDER — SODIUM CHLORIDE 0.9 % (FLUSH) 0.9 %
10 SYRINGE (ML) INJECTION AS NEEDED
Status: DISCONTINUED | OUTPATIENT
Start: 2022-04-26 | End: 2022-04-26 | Stop reason: HOSPADM

## 2022-04-26 RX ORDER — ONDANSETRON 2 MG/ML
4 INJECTION INTRAMUSCULAR; INTRAVENOUS ONCE
Status: COMPLETED | OUTPATIENT
Start: 2022-04-26 | End: 2022-04-26

## 2022-04-26 RX ORDER — LORAZEPAM 0.5 MG/1
0.5 TABLET ORAL 2 TIMES DAILY
Qty: 14 TABLET | Refills: 0 | Status: SHIPPED | OUTPATIENT
Start: 2022-04-26

## 2022-04-26 RX ORDER — LORAZEPAM 2 MG/ML
0.5 INJECTION INTRAMUSCULAR ONCE
Status: COMPLETED | OUTPATIENT
Start: 2022-04-26 | End: 2022-04-26

## 2022-04-26 RX ADMIN — ONDANSETRON 4 MG: 2 INJECTION INTRAMUSCULAR; INTRAVENOUS at 00:52

## 2022-04-26 RX ADMIN — LORAZEPAM 0.5 MG: 2 INJECTION INTRAMUSCULAR; INTRAVENOUS at 00:52

## 2022-04-26 NOTE — TELEPHONE ENCOUNTER
Caller: JOE HE    Relationship: Emergency Contact    Best call back number: [phone]    What is the best time to reach you: ANY     Who are you requesting to speak with (clinical staff, provider,  specific staff member): CLINICAL     Do you know the name of the person who called:JOE    What was the call regarding: JOE WOULD LIKE TO KNOW IF DR. WOLFE CAN USE LAB RESULTS FROM [facility] EMERGENCY ROOM VISIT 4/25/2022, SHE HAS A LAB APPOINTMENT THIS MORNING, HE IS WANTING TO MOVE THIS APPOINTMENT TO LATER IN THE AFTERNOON 4/26/2022    Do you require a callback: YES

## 2022-04-26 NOTE — TELEPHONE ENCOUNTER
Gave message to patient's  at 9:01am and [facility]

## 2022-04-26 NOTE — TELEPHONE ENCOUNTER
They did not do the A1c or liver function it does not need to be fasting so can come in later in the day.

## 2022-04-27 LAB — QT INTERVAL: 406 MS

## 2022-04-29 ENCOUNTER — OFFICE VISIT (OUTPATIENT)
Dept: FAMILY MEDICINE CLINIC | Facility: CLINIC | Age: 72
End: 2022-04-29

## 2022-04-29 VITALS
DIASTOLIC BLOOD PRESSURE: 80 MMHG | HEART RATE: 88 BPM | BODY MASS INDEX: 24.84 KG/M2 | SYSTOLIC BLOOD PRESSURE: 120 MMHG | WEIGHT: 135 LBS | HEIGHT: 62 IN | RESPIRATION RATE: 16 BRPM | OXYGEN SATURATION: 99 %

## 2022-04-29 DIAGNOSIS — R42 VERTIGO: ICD-10-CM

## 2022-04-29 DIAGNOSIS — E11.9 TYPE 2 DIABETES MELLITUS WITHOUT COMPLICATION, WITHOUT LONG-TERM CURRENT USE OF INSULIN: Primary | ICD-10-CM

## 2022-04-29 PROCEDURE — 99213 OFFICE O/P EST LOW 20 MIN: CPT | Performed by: FAMILY MEDICINE

## 2022-05-08 NOTE — PATIENT INSTRUCTIONS
Medicare Wellness  Personal Prevention Plan of Service     Date of Office Visit:    Encounter Provider:  Donnie Ng MD  Place of Service:  [facility]  Patient Name: Janna Flowers  :  1950    As part of the Medicare Wellness portion of your visit today, we are providing you with this personalized preventive plan of services (PPPS). This plan is based upon recommendations of the [place] Preventive Services Task Force (USPSTF) and the Advisory Committee on Immunization Practices (ACIP).    This lists the preventive care services that should be considered, and provides dates of when you are due. Items listed as completed are up-to-date and do not require any further intervention.    Health Maintenance   Topic Date Due    DIABETIC FOOT EXAM  2020    DIABETIC EYE EXAM  2021    DXA SCAN  06/15/2022    LIPID PANEL  2022    URINE MICROALBUMIN  2022    ANNUAL WELLNESS VISIT  2022    INFLUENZA VACCINE  2022    HEMOGLOBIN A1C  10/26/2022    MAMMOGRAM  2024    COLORECTAL CANCER SCREENING  2027    TDAP/TD VACCINES (2 - Td or Tdap) 10/04/2030    HEPATITIS C SCREENING  Completed    COVID-19 Vaccine  Completed    Pneumococcal Vaccine 65+  Completed    ZOSTER VACCINE  Completed       No orders of the defined types were placed in this encounter.      Return in about 2 months (around 2022) for Medicare Wellness.

## 2022-05-12 ENCOUNTER — TELEPHONE (OUTPATIENT)
Dept: FAMILY MEDICINE CLINIC | Facility: CLINIC | Age: 72
End: 2022-05-12

## 2022-05-12 NOTE — TELEPHONE ENCOUNTER
Spoke with patient.  She said she no longer takes her blood pressure so she doesn't know what it is running.  Said these episodes happen during the night in the early morning before she gets up.  It hit her today at 4am.  At 5:15am her sugar was 72.  She took two glucose tablets and at 7:30am it was 136. At 10am after breakfast it was 243/.  She took her Tresiba at 11:18am today.  Said the episodes last between 2-4 hours.  Last night she drank a Glucerna before bed.  Call with suggestions.

## 2022-05-12 NOTE — TELEPHONE ENCOUNTER
Caller: Janna Flowers    Relationship to patient: Self    Best call back number: [phone]    Where are you experiencing symptoms: SWEATS, BLURRED VISION, AND DIZZINESS IN THE MORNING    How long have you been experiencing symptoms: 2 DAYS    Is it the symptoms constant or intermittent: [] Constant  [x] Intermittent      If a prescription is needed, what is your preferred pharmacy:   [facility] [address] [place] [address] [place] - [address] [facility] - [phone]  - [phone] FX  [address] [facility] [place] [address]  Phone: [phone] Fax: [phone]

## 2022-05-12 NOTE — TELEPHONE ENCOUNTER
Need to know glucose reading and blood pressure when feeling like this. Does this go away quickly? Does it happen throughout the day or last all day? Does it only happen when transitioning from sitting to standing?

## 2022-05-12 NOTE — TELEPHONE ENCOUNTER
Spoke with Janna.  She is going to stop glipizide and cut the Tresiba back to 10 units.  Continue Farxiga and continue metformin.  I am pretty sure she is having hypoglycemia.

## 2022-05-24 RX ORDER — PRAVASTATIN SODIUM 20 MG
TABLET ORAL
Qty: 90 TABLET | Refills: 0 | Status: SHIPPED | OUTPATIENT
Start: 2022-05-24 | End: 2022-08-26

## 2022-05-27 NOTE — TELEPHONE ENCOUNTER
Caller: Janna Flowers    Relationship to patient: Self    Best call back number: [phone] OR CELL: [phone]    Patient is needing: PATIENT CALLED TO REPORT HER RECENT BLOOD GLUCOSE READINGS     05/13 - 99 AT 8 AM, 239 AT 10 PM     05/14 - 109 AT 7:48 AM, 247 AT 10:15     05/15 - 133 AT 8 AM , 281 AT 10:27 05/16 - 114 AT 8:25, 173 AT 10:30 05/17 - 123 AT 6:41, 236 AT 10:21 05/`8 - 111 AT 6:46, 155 AT 10:12 05/19 - 157 AT 6:36 - 224 AT 10:00    05/20 - 149 AT 7:00, 216 AT 10:30 05/21 - 130 AT 7:53, 236 AT 10:00 05/22 - 134 AT 7:00, 184 AT 10:30 05/23 - 148 AT 8:00, 269 AT 10:00 05/24 - 137 AT 7:00, 198 AT 10:30 05/25 - 126 AT 6:30, 130 AT 10:00 05/26 - 140 AT 8 AM, 188 AT 10:00 05/27 - 133 AT 8 AM     SHE WANTS TO KNOW IF SHE NEEDS TO GO BACK ON HER GLIMEPIRIDE

## 2022-05-27 NOTE — TELEPHONE ENCOUNTER
Stay off glipizide.   Increase tresiba to 15u a day.    In two weeks send me a list of BS readings FBS and some right before her evening meal.

## 2022-06-09 DIAGNOSIS — E11.9 TYPE 2 DIABETES MELLITUS WITHOUT COMPLICATION, WITHOUT LONG-TERM CURRENT USE OF INSULIN: ICD-10-CM

## 2022-06-09 RX ORDER — DAPAGLIFLOZIN 10 MG/1
TABLET, FILM COATED ORAL
Qty: 30 TABLET | Refills: 6 | Status: SHIPPED | OUTPATIENT
Start: 2022-06-09 | End: 2022-08-26 | Stop reason: SDUPTHER

## 2022-06-29 DIAGNOSIS — E78.2 MIXED HYPERLIPIDEMIA: ICD-10-CM

## 2022-06-29 DIAGNOSIS — E53.9 VITAMIN B DEFICIENCY: Primary | ICD-10-CM

## 2022-06-29 DIAGNOSIS — E11.9 TYPE 2 DIABETES MELLITUS WITHOUT COMPLICATION, WITHOUT LONG-TERM CURRENT USE OF INSULIN: ICD-10-CM

## 2022-06-29 DIAGNOSIS — M85.89 OSTEOPENIA OF MULTIPLE SITES: ICD-10-CM

## 2022-07-05 ENCOUNTER — LAB (OUTPATIENT)
Dept: FAMILY MEDICINE CLINIC | Facility: CLINIC | Age: 72
End: 2022-07-05

## 2022-07-05 DIAGNOSIS — M85.89 OSTEOPENIA OF MULTIPLE SITES: ICD-10-CM

## 2022-07-05 DIAGNOSIS — E78.2 MIXED HYPERLIPIDEMIA: ICD-10-CM

## 2022-07-05 DIAGNOSIS — E11.9 TYPE 2 DIABETES MELLITUS WITHOUT COMPLICATION, WITHOUT LONG-TERM CURRENT USE OF INSULIN: ICD-10-CM

## 2022-07-05 DIAGNOSIS — E53.9 VITAMIN B DEFICIENCY: ICD-10-CM

## 2022-07-05 LAB
25(OH)D3 SERPL-MCNC: 60.9 NG/ML (ref 30–100)
ALBUMIN SERPL-MCNC: 4.5 G/DL (ref 3.5–5.2)
ALBUMIN UR-MCNC: 1.5 MG/DL
ALBUMIN/GLOB SERPL: 2 G/DL
ALP SERPL-CCNC: 42 U/L (ref 39–117)
ALT SERPL W P-5'-P-CCNC: 14 U/L (ref 1–33)
ANION GAP SERPL CALCULATED.3IONS-SCNC: 12.6 MMOL/L (ref 5–15)
AST SERPL-CCNC: 16 U/L (ref 1–32)
BACTERIA UR QL AUTO: NORMAL /HPF
BASOPHILS # BLD AUTO: 0.03 10*3/MM3 (ref 0–0.2)
BASOPHILS NFR BLD AUTO: 0.5 % (ref 0–1.5)
BILIRUB SERPL-MCNC: 0.4 MG/DL (ref 0–1.2)
BILIRUB UR QL STRIP: NEGATIVE
BUN SERPL-MCNC: 16 MG/DL (ref 8–23)
BUN/CREAT SERPL: 22.5 (ref 7–25)
CALCIUM SPEC-SCNC: 9.2 MG/DL (ref 8.6–10.5)
CHLORIDE SERPL-SCNC: 99 MMOL/L (ref 98–107)
CHOLEST SERPL-MCNC: 160 MG/DL (ref 0–200)
CLARITY UR: CLEAR
CO2 SERPL-SCNC: 27.4 MMOL/L (ref 22–29)
COLOR UR: YELLOW
CREAT SERPL-MCNC: 0.71 MG/DL (ref 0.57–1)
DEPRECATED RDW RBC AUTO: 42.4 FL (ref 37–54)
EGFRCR SERPLBLD CKD-EPI 2021: 90.5 ML/MIN/1.73
EOSINOPHIL # BLD AUTO: 0.18 10*3/MM3 (ref 0–0.4)
EOSINOPHIL NFR BLD AUTO: 3 % (ref 0.3–6.2)
ERYTHROCYTE [DISTWIDTH] IN BLOOD BY AUTOMATED COUNT: 13.9 % (ref 12.3–15.4)
GLOBULIN UR ELPH-MCNC: 2.3 GM/DL
GLUCOSE SERPL-MCNC: 107 MG/DL (ref 65–99)
GLUCOSE UR STRIP-MCNC: ABNORMAL MG/DL
HBA1C MFR BLD: 7.7 % (ref 3.5–5.6)
HCT VFR BLD AUTO: 40.6 % (ref 34–46.6)
HDLC SERPL-MCNC: 62 MG/DL (ref 40–60)
HGB BLD-MCNC: 12.8 G/DL (ref 12–15.9)
HGB UR QL STRIP.AUTO: NEGATIVE
HYALINE CASTS UR QL AUTO: NORMAL /LPF
IMM GRANULOCYTES # BLD AUTO: 0.01 10*3/MM3 (ref 0–0.05)
IMM GRANULOCYTES NFR BLD AUTO: 0.2 % (ref 0–0.5)
KETONES UR QL STRIP: ABNORMAL
LDLC SERPL CALC-MCNC: 80 MG/DL (ref 0–100)
LDLC/HDLC SERPL: 1.25 {RATIO}
LEUKOCYTE ESTERASE UR QL STRIP.AUTO: ABNORMAL
LYMPHOCYTES # BLD AUTO: 2.56 10*3/MM3 (ref 0.7–3.1)
LYMPHOCYTES NFR BLD AUTO: 42 % (ref 19.6–45.3)
MCH RBC QN AUTO: 27.1 PG (ref 26.6–33)
MCHC RBC AUTO-ENTMCNC: 31.5 G/DL (ref 31.5–35.7)
MCV RBC AUTO: 85.8 FL (ref 79–97)
MONOCYTES # BLD AUTO: 0.6 10*3/MM3 (ref 0.1–0.9)
MONOCYTES NFR BLD AUTO: 9.9 % (ref 5–12)
NEUTROPHILS NFR BLD AUTO: 2.71 10*3/MM3 (ref 1.7–7)
NEUTROPHILS NFR BLD AUTO: 44.4 % (ref 42.7–76)
NITRITE UR QL STRIP: NEGATIVE
NRBC BLD AUTO-RTO: 0 /100 WBC (ref 0–0.2)
PH UR STRIP.AUTO: 6 [PH] (ref 5–8)
PLATELET # BLD AUTO: 328 10*3/MM3 (ref 140–450)
PMV BLD AUTO: 11.1 FL (ref 6–12)
POTASSIUM SERPL-SCNC: 4.2 MMOL/L (ref 3.5–5.2)
PROT SERPL-MCNC: 6.8 G/DL (ref 6–8.5)
PROT UR QL STRIP: ABNORMAL
RBC # BLD AUTO: 4.73 10*6/MM3 (ref 3.77–5.28)
RBC # UR STRIP: NORMAL /HPF
REF LAB TEST METHOD: NORMAL
SODIUM SERPL-SCNC: 139 MMOL/L (ref 136–145)
SP GR UR STRIP: >=1.03 (ref 1–1.03)
SQUAMOUS #/AREA URNS HPF: NORMAL /HPF
T4 FREE SERPL-MCNC: 1.49 NG/DL (ref 0.93–1.7)
TRIGL SERPL-MCNC: 101 MG/DL (ref 0–150)
TSH SERPL DL<=0.05 MIU/L-ACNC: 2.22 UIU/ML (ref 0.27–4.2)
UROBILINOGEN UR QL STRIP: ABNORMAL
VIT B12 BLD-MCNC: 462 PG/ML (ref 211–946)
VLDLC SERPL-MCNC: 18 MG/DL (ref 5–40)
WBC # UR STRIP: NORMAL /HPF
WBC NRBC COR # BLD: 6.09 10*3/MM3 (ref 3.4–10.8)

## 2022-07-05 PROCEDURE — 36415 COLL VENOUS BLD VENIPUNCTURE: CPT

## 2022-07-05 PROCEDURE — 84443 ASSAY THYROID STIM HORMONE: CPT | Performed by: FAMILY MEDICINE

## 2022-07-05 PROCEDURE — 85025 COMPLETE CBC W/AUTO DIFF WBC: CPT | Performed by: FAMILY MEDICINE

## 2022-07-05 PROCEDURE — 82306 VITAMIN D 25 HYDROXY: CPT | Performed by: FAMILY MEDICINE

## 2022-07-05 PROCEDURE — 80061 LIPID PANEL: CPT | Performed by: FAMILY MEDICINE

## 2022-07-05 PROCEDURE — 84439 ASSAY OF FREE THYROXINE: CPT | Performed by: FAMILY MEDICINE

## 2022-07-05 PROCEDURE — 82043 UR ALBUMIN QUANTITATIVE: CPT | Performed by: FAMILY MEDICINE

## 2022-07-05 PROCEDURE — 80053 COMPREHEN METABOLIC PANEL: CPT | Performed by: FAMILY MEDICINE

## 2022-07-05 PROCEDURE — 82607 VITAMIN B-12: CPT | Performed by: FAMILY MEDICINE

## 2022-07-05 PROCEDURE — 83036 HEMOGLOBIN GLYCOSYLATED A1C: CPT | Performed by: FAMILY MEDICINE

## 2022-07-05 PROCEDURE — 81001 URINALYSIS AUTO W/SCOPE: CPT | Performed by: FAMILY MEDICINE

## 2022-07-11 ENCOUNTER — OFFICE VISIT (OUTPATIENT)
Dept: FAMILY MEDICINE CLINIC | Facility: CLINIC | Age: 72
End: 2022-07-11

## 2022-07-11 VITALS
HEIGHT: 63 IN | WEIGHT: 128 LBS | RESPIRATION RATE: 16 BRPM | HEART RATE: 102 BPM | SYSTOLIC BLOOD PRESSURE: 120 MMHG | OXYGEN SATURATION: 96 % | TEMPERATURE: 97.6 F | BODY MASS INDEX: 22.68 KG/M2 | DIASTOLIC BLOOD PRESSURE: 70 MMHG

## 2022-07-11 DIAGNOSIS — E11.9 TYPE 2 DIABETES MELLITUS WITHOUT COMPLICATION, WITHOUT LONG-TERM CURRENT USE OF INSULIN: ICD-10-CM

## 2022-07-11 DIAGNOSIS — Z00.00 MEDICARE ANNUAL WELLNESS VISIT, SUBSEQUENT: Primary | ICD-10-CM

## 2022-07-11 DIAGNOSIS — M19.90 OSTEOARTHRITIS, UNSPECIFIED OSTEOARTHRITIS TYPE, UNSPECIFIED SITE: ICD-10-CM

## 2022-07-11 DIAGNOSIS — I10 ESSENTIAL HYPERTENSION: ICD-10-CM

## 2022-07-11 DIAGNOSIS — E78.2 MIXED HYPERLIPIDEMIA: ICD-10-CM

## 2022-07-11 DIAGNOSIS — M85.89 OSTEOPENIA OF MULTIPLE SITES: ICD-10-CM

## 2022-07-11 DIAGNOSIS — G63 POLYNEUROPATHY ASSOCIATED WITH UNDERLYING DISEASE: ICD-10-CM

## 2022-07-11 PROBLEM — H40.013 OPEN ANGLE WITH BORDERLINE FINDINGS, LOW RISK, BILATERAL: Status: RESOLVED | Noted: 2019-11-25 | Resolved: 2022-07-11

## 2022-07-11 PROBLEM — H25.813 COMBINED FORMS OF AGE-RELATED CATARACT, BILATERAL: Status: RESOLVED | Noted: 2019-11-25 | Resolved: 2022-07-11

## 2022-07-11 PROCEDURE — G0439 PPPS, SUBSEQ VISIT: HCPCS | Performed by: FAMILY MEDICINE

## 2022-07-11 PROCEDURE — 1160F RVW MEDS BY RX/DR IN RCRD: CPT | Performed by: FAMILY MEDICINE

## 2022-07-11 PROCEDURE — 99213 OFFICE O/P EST LOW 20 MIN: CPT | Performed by: FAMILY MEDICINE

## 2022-07-11 PROCEDURE — 1170F FXNL STATUS ASSESSED: CPT | Performed by: FAMILY MEDICINE

## 2022-07-11 NOTE — PROGRESS NOTES
The ABCs of the Annual Wellness Visit  Subsequent Medicare Wellness Visit    Chief Complaint   Patient presents with   • Medicare Wellness-subsequent      Subjective    History of Present Illness:  Janna Flowers is a 72 y.o. female who presents for a Subsequent Medicare Wellness Visit.  Upon arrival to the room the patient underwent the Medicare health risk assessment.  Neither the questions themselves or the answers that were given prompted any major concern on the part of the patient or by the medical staff that gave the assessment.  As far as the preventative care examinations and the preventative care immunizations that this patient requires they are as listed below.     The patient state she has not received her second COVID-19 booster vaccine. She reports she is scheduled for a colonoscopy on 07/25/2022. She notes she only takes half of her dose of insulin that morning. She admits she has not had a DEXA scan in a couple of years. She adds she has never had an abnormal Pap smear. She states  she gets her eye exam every 6 months. She reports she does not get her feet checked by a podiatrist. She notes she has neuropathy. She admits she does not want to take anything. She adds she does not see a dermatologist. She states she does not smoke, and drinks a glass of wine occasionally. She reports she has a living will. She adds she is retired. She notes she is all up to date on immunizations.       Screening tests recommended:    Colonoscopy UTD  Mammogram  UTD  DEXA-Will schedule.  PAP/ Pelvic--aged out  Diabetic eye exam--up to date  Diabetic foot exam--will send to [facility]  Dentist- q 6mos  Derm- will see Dr. Zhu.        Immunization:  Influenza UTD  Prevnar 13 UTD  Pneumovax UTD  Tetanus UTD  Shingles vaccine UTD  Hepatitis -up to date  Covid -up to date                   The following portions of the patient's history were reviewed and   updated as appropriate: allergies, current medications, past  "family history, past medical history, past social history, past surgical history and problem list.    Compared to one year ago, the patient feels her physical   health is the same.    Compared to one year ago, the patient feels her mental   health is the same.    Recent Hospitalizations:  She was not admitted to the hospital during the last year.       Current Medical Providers:  Patient Care Team:  Donnie Ng MD as PCP - General    Outpatient Medications Prior to Visit   Medication Sig Dispense Refill   • Accu-Chek Guide test strip USE TO CHECK BLOOD SUGAR TWO TIMES A  each 5   • azelastine (OPTIVAR) 0.05 % ophthalmic solution      • Blood Glucose Monitoring Suppl (ACCU-CHEK COMPACT CARE KIT) kit ACCU-CHEK COMPACT PLUS CARE KIT     • Farxiga 10 MG tablet TAKE ONE TABLET BY MOUTH DAILY 30 tablet 6   • Insulin Pen Needle (Pen Needles 5/16\") 31G X 8 MM misc 1 Device Daily. 30 each 11   • lisinopril (PRINIVIL,ZESTRIL) 10 MG tablet TAKE ONE TABLET BY MOUTH DAILY 90 tablet 0   • loratadine (CLARITIN) 10 MG tablet      • LORazepam (ATIVAN) 0.5 MG tablet Take 1 tablet by mouth 2 (Two) Times a Day. 14 tablet 0   • metFORMIN (GLUCOPHAGE) 1000 MG tablet TAKE ONE TABLET BY MOUTH TWICE A  tablet 0   • pravastatin (PRAVACHOL) 20 MG tablet TAKE ONE TABLET BY MOUTH DAILY 90 tablet 0   • insulin degludec (TRESIBA FLEXTOUCH) 100 UNIT/ML solution pen-injector injection Inject 15 Units under the skin into the appropriate area as directed Daily for 60 days. 3 pen 6   • Blood Glucose Monitoring Suppl (ACCU-CHEK AVINASH) device Needs to new machine  Dx e11.9     (any brand is ok ) 1 each 0   • glipizide (GLUCOTROL) 10 MG tablet Take 1 tablet by mouth 2 (Two) Times a Day for 90 days. 180 tablet 3     No facility-administered medications prior to visit.       No opioid medication identified on active medication list. I have reviewed chart for other potential  high risk medication/s and harmful drug interactions in the " elderly.          Aspirin is not on active medication list.  Aspirin use is not indicated based on review of current medical condition/s. Risk of harm outweighs potential benefits.  .    Patient Active Problem List   Diagnosis   • Diabetes mellitus, type II (HCC)   • Essential hypertension   • Hyperlipidemia   • Osteoarthritis   • Peripheral neuropathy   • Vitamin B deficiency   • Presbyopia   • Vitreous degeneration   • Combined form of senile cataract   • Medicare annual wellness visit, subsequent   • Osteopenia of multiple sites   • Vertigo     Advance Care Planning  Advance Directive is not on file.  ACP discussion was held with the patient during this visit. Patient has an advance directive (not in EMR), copy requested.    Review of Systems   Constitutional: Negative.  Negative for fatigue and fever.   HENT: Negative.  Negative for congestion, sore throat, trouble swallowing and voice change.    Eyes: Negative.  Negative for redness and visual disturbance.   Respiratory: Negative.  Negative for cough, chest tightness, shortness of breath and wheezing.    Cardiovascular: Negative.  Negative for chest pain, palpitations and leg swelling.   Gastrointestinal: Negative.  Negative for abdominal distention, abdominal pain, anal bleeding, diarrhea and nausea.   Endocrine: Negative.  Negative for cold intolerance and heat intolerance.   Genitourinary: Negative.  Negative for difficulty urinating, dysuria, pelvic pain and vaginal bleeding.   Musculoskeletal: Negative.  Negative for arthralgias.   Skin: Negative.  Negative for rash.   Allergic/Immunologic: Negative.  Negative for environmental allergies.   Neurological: Negative.  Negative for weakness and light-headedness.   Hematological: Negative.  Does not bruise/bleed easily.   Psychiatric/Behavioral: Negative.  Negative for agitation, decreased concentration and sleep disturbance. The patient is not hyperactive.    All other systems reviewed and are negative.      "  Objective    Vitals:    07/11/22 1522   BP: 120/70   BP Location: Left arm   Pulse: 102   Resp: 16   Temp: 97.6 °F (36.4 °C)   TempSrc: Infrared   SpO2: 96%   Weight: 58.1 kg (128 lb)   Height: 160 cm (63\")     Estimated body mass index is 22.67 kg/m² as calculated from the following:    Height as of this encounter: 160 cm (63\").    Weight as of this encounter: 58.1 kg (128 lb).    BMI is within normal parameters. No other follow-up for BMI required.      Does the patient have evidence of cognitive impairment? No    Physical Exam  Constitutional:       Appearance: Normal appearance. She is well-developed and normal weight.   HENT:      Head: Normocephalic and atraumatic.      Right Ear: Tympanic membrane, ear canal and external ear normal.      Left Ear: Tympanic membrane, ear canal and external ear normal.      Nose: Nose normal.      Mouth/Throat:      Mouth: Mucous membranes are moist.      Pharynx: Oropharynx is clear. No oropharyngeal exudate.   Eyes:      Extraocular Movements: Extraocular movements intact.      Conjunctiva/sclera: Conjunctivae normal.      Pupils: Pupils are equal, round, and reactive to light.   Cardiovascular:      Rate and Rhythm: Normal rate and regular rhythm.      Pulses: Normal pulses.      Heart sounds: Normal heart sounds.   Pulmonary:      Effort: Pulmonary effort is normal.      Breath sounds: Normal breath sounds.   Abdominal:      General: Bowel sounds are normal.      Palpations: Abdomen is soft.   Musculoskeletal:         General: Normal range of motion.      Cervical back: Normal range of motion and neck supple.   Skin:     General: Skin is warm and dry.   Neurological:      General: No focal deficit present.      Mental Status: She is alert and oriented to person, place, and time. Mental status is at baseline.   Psychiatric:         Mood and Affect: Mood normal.         Behavior: Behavior normal.         Thought Content: Thought content normal.         Judgment: Judgment " normal.       Lab Results   Component Value Date    TRIG 101 07/05/2022    HDL 62 (H) 07/05/2022    LDL 80 07/05/2022    VLDL 18 07/05/2022    HGBA1C 7.7 (H) 07/05/2022            HEALTH RISK ASSESSMENT    Smoking Status:  Social History     Tobacco Use   Smoking Status Never Smoker   Smokeless Tobacco Never Used     Alcohol Consumption:  Social History     Substance and Sexual Activity   Alcohol Use No     Fall Risk Screen:    STEADI Fall Risk Assessment was completed, and patient is at LOW risk for falls.Assessment completed on:7/11/2022    Depression Screening:  PHQ-2/PHQ-9 Depression Screening 7/11/2022   Retired PHQ-9 Total Score -   Retired Total Score -   Little Interest or Pleasure in Doing Things 0-->not at all   Feeling Down, Depressed or Hopeless 0-->not at all   PHQ-9: Brief Depression Severity Measure Score 0       Health Habits and Functional and Cognitive Screening:  Functional & Cognitive Status 7/11/2022   Do you have difficulty preparing food and eating? No   Do you have difficulty bathing yourself, getting dressed or grooming yourself? No   Do you have difficulty using the toilet? No   Do you have difficulty moving around from place to place? No   Do you have trouble with steps or getting out of a bed or a chair? No   Current Diet Well Balanced Diet   Dental Exam Up to date   Eye Exam Up to date   Exercise (times per week) 5 times per week   Current Exercises Include (No Data)        Exercise Comment rowing   Current Exercise Activities Include -   Do you need help using the phone?  No   Are you deaf or do you have serious difficulty hearing?  No   Do you need help with transportation? No   Do you need help shopping? No   Do you need help preparing meals?  No   Do you need help with housework?  No   Do you need help with laundry? No   Do you need help taking your medications? No   Do you need help managing money? No   Do you ever drive or ride in a car without wearing a seat belt? No   Have you  felt unusual stress, anger or loneliness in the last month? No   Who do you live with? Spouse   If you need help, do you have trouble finding someone available to you? No   Have you been bothered in the last four weeks by sexual problems? -   Do you have difficulty concentrating, remembering or making decisions? No       Age-appropriate Screening Schedule:  Refer to the list below for future screening recommendations based on patient's age, sex and/or medical conditions. Orders for these recommended tests are listed in the plan section. The patient has been provided with a written plan.    Health Maintenance   Topic Date Due   • DIABETIC FOOT EXAM  11/25/2020   • DIABETIC EYE EXAM  08/11/2021   • DXA SCAN  06/15/2022   • INFLUENZA VACCINE  10/01/2022   • HEMOGLOBIN A1C  01/05/2023   • LIPID PANEL  07/05/2023   • URINE MICROALBUMIN  07/05/2023   • MAMMOGRAM  04/13/2024   • TDAP/TD VACCINES (2 - Td or Tdap) 10/04/2030   • ZOSTER VACCINE  Completed              Assessment & Plan   CMS Preventative Services Quick Reference  Risk Factors Identified During Encounter  None Identified  The above risks/problems have been discussed with the patient.  Follow up actions/plans if indicated are seen below in the Assessment/Plan Section.  Pertinent information has been shared with the patient in the After Visit Summary.    #1--Medicare annual wellness visit, subsequent  Upon arrival to the room the patient underwent the Medicare health risk assessment.  Neither the questions themselves or the answers that were given prompted any major concern on the part of the patient or by the medical staff that gave the assessment.  As far as the preventative care examinations and the preventative care immunizations that this patient requires they are as listed below.   Screening tests recommended:    Colonoscopy UTD  Mammogram  UTD  DEXA-Will schedule.  PAP/ Pelvic--aged out  Diabetic eye exam--up to date  Diabetic foot exam--will send to [facility]  Dentist- q 6mos  Derm- will see Dr. Zhu.  Immunization:  Influenza UTD  Prevnar 13 UTD  Pneumovax UTD  Tetanus UTD  Shingles vaccine UTD  Hepatitis -up to date  Covid -up to date                   #2 essential hypertension.   Patient has hypertension and is under treatment for it. her blood pressure today was 120/70. Her home readings are excellent, so she will continue current medications.     #3 hyperlipidemia, unspecified hyperlipidemia type  The patient's most lipid panel was excellent. It shows great control of both, total cholesterol, LDL cholesterol, and HDL cholesterol. She will continue her diet and pravastatin 20 mg daily.     #4 type 2 diabetes insulin dependent.  The patient has been on Tresiba 15 units per day. Her A1c was 7.7 percent, so I'm going to increaser her Tresiba to 20 units per day. She got overall good control; however, she can improve 7.7 percent A1c and I feel she can do better than that, down to at or below 7. She will continue Metformin and Farxiga.     #5 osteoarthritis.   Most of her arthritis is felt in her lower back, sometimes in her hips, knees, and hands. For the most part, she has some stiffness in the morning and achiness if she overdoes it. When it does bother her, she can take some over-the-counter NSAID's as well.     #6 polyneuropathy.  Patient has polyneuropathy in her feet and toes. It burns, tingles, and stings at times. It doesn't seem to bother her too much; however, I have advised her to stay active, and to make sure she is taking a good multivitamin with folic acid. If it gets too bothersome, she can start on a vitamin called Metanx, and she can get that through [employer].  She will let me know if she feels she needs more in this area.         Follow Up:   Return in about 1 year (around 7/11/2023) for Medicare Wellness.     An After Visit Summary and PPPS were made available to the patient.        Transcribed from ambient dictation for Donnie Ng MD  by RENEE CISNEROS.  07/11/22   18:17 EDT    Patient verbalized consent to the visit recording.

## 2022-07-13 ENCOUNTER — TELEPHONE (OUTPATIENT)
Dept: FAMILY MEDICINE CLINIC | Facility: CLINIC | Age: 72
End: 2022-07-13

## 2022-07-13 NOTE — TELEPHONE ENCOUNTER
Caller: Janna Flowers    Relationship: Self    Best call back number: [phone]    What orders are you requesting (i.e. lab or imaging): DEXA SCAN     In what timeframe would the patient need to come in: ASA[    Where will you receive your lab/imaging services: [facility]     Additional notes: PATIENT CALLED AND SAID SHE TRIED TO SCHEDULE HER DEXA SCAN WITH [facility] AND THEY DID NOT HAVE THE ORDER

## 2022-07-13 NOTE — TELEPHONE ENCOUNTER
Incoming Refill Request      Medication requested (name and dose):   insulin degludec (TRESIBA FLEXTOUCH) 100 UNIT/ML solution pen-injector injection  20 Units, Daily         Pharmacy where request should be sent: [facility] [place], [place] - [address] [place] [address] - [phone]  - [phone] FX  [phone]    Additional details provided by patient: NONE    Best call back number: [phone]    Does the patient have less than a 3 day supply:  [x] Yes  [] No    Edgar Conrad Rep  07/13/22, 09:01 EDT

## 2022-07-21 RX ORDER — LISINOPRIL 10 MG/1
TABLET ORAL
Qty: 90 TABLET | Refills: 0 | Status: SHIPPED | OUTPATIENT
Start: 2022-07-21 | End: 2022-10-18

## 2022-07-25 ENCOUNTER — ON CAMPUS - OUTPATIENT (AMBULATORY)
Dept: URBAN - METROPOLITAN AREA HOSPITAL 2 | Facility: HOSPITAL | Age: 72
End: 2022-07-25
Payer: COMMERCIAL

## 2022-07-25 ENCOUNTER — OFFICE (AMBULATORY)
Dept: URBAN - METROPOLITAN AREA PATHOLOGY 4 | Facility: PATHOLOGY | Age: 72
End: 2022-07-25
Payer: COMMERCIAL

## 2022-07-25 VITALS
DIASTOLIC BLOOD PRESSURE: 59 MMHG | RESPIRATION RATE: 16 BRPM | SYSTOLIC BLOOD PRESSURE: 110 MMHG | OXYGEN SATURATION: 97 % | DIASTOLIC BLOOD PRESSURE: 67 MMHG | HEART RATE: 83 BPM | HEART RATE: 92 BPM | SYSTOLIC BLOOD PRESSURE: 112 MMHG | SYSTOLIC BLOOD PRESSURE: 100 MMHG | OXYGEN SATURATION: 94 % | OXYGEN SATURATION: 98 % | OXYGEN SATURATION: 100 % | DIASTOLIC BLOOD PRESSURE: 70 MMHG | WEIGHT: 123 LBS | TEMPERATURE: 97.1 F | DIASTOLIC BLOOD PRESSURE: 84 MMHG | SYSTOLIC BLOOD PRESSURE: 139 MMHG | HEIGHT: 63 IN | DIASTOLIC BLOOD PRESSURE: 69 MMHG | SYSTOLIC BLOOD PRESSURE: 146 MMHG | DIASTOLIC BLOOD PRESSURE: 56 MMHG | HEART RATE: 82 BPM | DIASTOLIC BLOOD PRESSURE: 89 MMHG | HEART RATE: 85 BPM | OXYGEN SATURATION: 99 % | SYSTOLIC BLOOD PRESSURE: 120 MMHG | HEART RATE: 81 BPM

## 2022-07-25 DIAGNOSIS — Z86.010 PERSONAL HISTORY OF COLONIC POLYPS: ICD-10-CM

## 2022-07-25 DIAGNOSIS — D12.3 BENIGN NEOPLASM OF TRANSVERSE COLON: ICD-10-CM

## 2022-07-25 PROBLEM — K63.5 POLYP OF COLON: Status: ACTIVE | Noted: 2022-07-25

## 2022-07-25 LAB
GI HISTOLOGY: A. UNSPECIFIED: (no result)
GI HISTOLOGY: PDF REPORT: (no result)

## 2022-07-25 PROCEDURE — 45385 COLONOSCOPY W/LESION REMOVAL: CPT | Mod: PT | Performed by: INTERNAL MEDICINE

## 2022-07-25 PROCEDURE — 88305 TISSUE EXAM BY PATHOLOGIST: CPT | Mod: 26 | Performed by: INTERNAL MEDICINE

## 2022-08-26 ENCOUNTER — TELEPHONE (OUTPATIENT)
Dept: FAMILY MEDICINE CLINIC | Facility: CLINIC | Age: 72
End: 2022-08-26

## 2022-08-26 RX ORDER — DAPAGLIFLOZIN 10 MG/1
1 TABLET, FILM COATED ORAL DAILY
Qty: 90 TABLET | Refills: 2 | Status: SHIPPED | OUTPATIENT
Start: 2022-08-26

## 2022-08-26 RX ORDER — BLOOD SUGAR DIAGNOSTIC
STRIP MISCELLANEOUS
Qty: 300 EACH | Refills: 1 | Status: SHIPPED | OUTPATIENT
Start: 2022-08-26

## 2022-08-26 RX ORDER — PRAVASTATIN SODIUM 20 MG
TABLET ORAL
Qty: 90 TABLET | Refills: 0 | Status: SHIPPED | OUTPATIENT
Start: 2022-08-26 | End: 2022-11-22

## 2022-08-26 RX ORDER — PEN NEEDLE, DIABETIC 31 GX5/16"
NEEDLE, DISPOSABLE MISCELLANEOUS
Qty: 100 EACH | Refills: 1 | Status: SHIPPED | OUTPATIENT
Start: 2022-08-26

## 2022-08-26 NOTE — TELEPHONE ENCOUNTER
Incoming Refill Request      Medication requested (name and dose):   Farxiga 10 MG tablet   6 ordered         Pharmacy where request should be sent: [facility] [place] [facility] [place], [place] - [address] [facility] - [phone]  - [phone]   [phone]    Additional details provided by patient: PATIENT RWQUESTED A THREE MONTH SUPPLY WITH REFILLS     Best call back number: [phone]    Does the patient have less than a 3 day supply:  [x] Yes  [] No    Edgar Conrad Rep  08/26/22, 09:28 EDT

## 2022-09-10 DIAGNOSIS — E11.9 TYPE 2 DIABETES MELLITUS WITHOUT COMPLICATION, WITHOUT LONG-TERM CURRENT USE OF INSULIN: ICD-10-CM

## 2022-10-10 ENCOUNTER — TELEPHONE (OUTPATIENT)
Dept: FAMILY MEDICINE CLINIC | Facility: CLINIC | Age: 72
End: 2022-10-10

## 2022-10-10 NOTE — TELEPHONE ENCOUNTER
Caller: Janna Flowers    Relationship to patient: Self    Best call back number: [phone]    Patient is needing: PATIENT IS WANTING TO KNOW IF THERE ANY SAMPLES OF dapagliflozin Propanediol (Farxiga) 10 MG tablet FOR HER.REQUESITNG A CALL BACK FOR UPDATES.

## 2022-10-10 NOTE — TELEPHONE ENCOUNTER
Left message on patient's voice mail at 12:30pm that we do have samples for her.    HUB TO SHARE    We do have samples up front for her.

## 2022-10-18 RX ORDER — LISINOPRIL 10 MG/1
TABLET ORAL
Qty: 90 TABLET | Refills: 0 | Status: SHIPPED | OUTPATIENT
Start: 2022-10-18 | End: 2023-01-08

## 2022-11-04 DIAGNOSIS — E11.9 TYPE 2 DIABETES MELLITUS WITHOUT COMPLICATION, WITHOUT LONG-TERM CURRENT USE OF INSULIN: Primary | ICD-10-CM

## 2022-11-04 DIAGNOSIS — E78.2 MIXED HYPERLIPIDEMIA: ICD-10-CM

## 2022-11-07 ENCOUNTER — LAB (OUTPATIENT)
Dept: FAMILY MEDICINE CLINIC | Facility: CLINIC | Age: 72
End: 2022-11-07

## 2022-11-07 DIAGNOSIS — E11.9 TYPE 2 DIABETES MELLITUS WITHOUT COMPLICATION, WITHOUT LONG-TERM CURRENT USE OF INSULIN: ICD-10-CM

## 2022-11-07 DIAGNOSIS — E78.2 MIXED HYPERLIPIDEMIA: ICD-10-CM

## 2022-11-07 LAB
ALBUMIN SERPL-MCNC: 4 G/DL (ref 3.5–5.2)
ALBUMIN/GLOB SERPL: 1.4 G/DL
ALP SERPL-CCNC: 41 U/L (ref 39–117)
ALT SERPL W P-5'-P-CCNC: 10 U/L (ref 1–33)
ANION GAP SERPL CALCULATED.3IONS-SCNC: 7.9 MMOL/L (ref 5–15)
AST SERPL-CCNC: 19 U/L (ref 1–32)
BASOPHILS # BLD AUTO: 0.04 10*3/MM3 (ref 0–0.2)
BASOPHILS NFR BLD AUTO: 0.5 % (ref 0–1.5)
BILIRUB SERPL-MCNC: 0.3 MG/DL (ref 0–1.2)
BUN SERPL-MCNC: 14 MG/DL (ref 8–23)
BUN/CREAT SERPL: 22.6 (ref 7–25)
CALCIUM SPEC-SCNC: 9.2 MG/DL (ref 8.6–10.5)
CHLORIDE SERPL-SCNC: 103 MMOL/L (ref 98–107)
CHOLEST SERPL-MCNC: 159 MG/DL (ref 0–200)
CO2 SERPL-SCNC: 27.1 MMOL/L (ref 22–29)
CREAT SERPL-MCNC: 0.62 MG/DL (ref 0.57–1)
DEPRECATED RDW RBC AUTO: 42.3 FL (ref 37–54)
EGFRCR SERPLBLD CKD-EPI 2021: 94.8 ML/MIN/1.73
EOSINOPHIL # BLD AUTO: 0.19 10*3/MM3 (ref 0–0.4)
EOSINOPHIL NFR BLD AUTO: 2.6 % (ref 0.3–6.2)
ERYTHROCYTE [DISTWIDTH] IN BLOOD BY AUTOMATED COUNT: 13.5 % (ref 12.3–15.4)
GLOBULIN UR ELPH-MCNC: 2.9 GM/DL
GLUCOSE SERPL-MCNC: 87 MG/DL (ref 65–99)
HBA1C MFR BLD: 7.1 % (ref 3.5–5.6)
HCT VFR BLD AUTO: 39.2 % (ref 34–46.6)
HDLC SERPL-MCNC: 59 MG/DL (ref 40–60)
HGB BLD-MCNC: 12.6 G/DL (ref 12–15.9)
IMM GRANULOCYTES # BLD AUTO: 0.02 10*3/MM3 (ref 0–0.05)
IMM GRANULOCYTES NFR BLD AUTO: 0.3 % (ref 0–0.5)
LDLC SERPL CALC-MCNC: 84 MG/DL (ref 0–100)
LDLC/HDLC SERPL: 1.41 {RATIO}
LYMPHOCYTES # BLD AUTO: 2.9 10*3/MM3 (ref 0.7–3.1)
LYMPHOCYTES NFR BLD AUTO: 39.7 % (ref 19.6–45.3)
MCH RBC QN AUTO: 27.7 PG (ref 26.6–33)
MCHC RBC AUTO-ENTMCNC: 32.1 G/DL (ref 31.5–35.7)
MCV RBC AUTO: 86.2 FL (ref 79–97)
MONOCYTES # BLD AUTO: 0.62 10*3/MM3 (ref 0.1–0.9)
MONOCYTES NFR BLD AUTO: 8.5 % (ref 5–12)
NEUTROPHILS NFR BLD AUTO: 3.54 10*3/MM3 (ref 1.7–7)
NEUTROPHILS NFR BLD AUTO: 48.4 % (ref 42.7–76)
NRBC BLD AUTO-RTO: 0 /100 WBC (ref 0–0.2)
PLATELET # BLD AUTO: 321 10*3/MM3 (ref 140–450)
PMV BLD AUTO: 11.2 FL (ref 6–12)
POTASSIUM SERPL-SCNC: 4.6 MMOL/L (ref 3.5–5.2)
PROT SERPL-MCNC: 6.9 G/DL (ref 6–8.5)
RBC # BLD AUTO: 4.55 10*6/MM3 (ref 3.77–5.28)
SODIUM SERPL-SCNC: 138 MMOL/L (ref 136–145)
TRIGL SERPL-MCNC: 85 MG/DL (ref 0–150)
VLDLC SERPL-MCNC: 16 MG/DL (ref 5–40)
WBC NRBC COR # BLD: 7.31 10*3/MM3 (ref 3.4–10.8)

## 2022-11-07 PROCEDURE — 80053 COMPREHEN METABOLIC PANEL: CPT | Performed by: FAMILY MEDICINE

## 2022-11-07 PROCEDURE — 80061 LIPID PANEL: CPT | Performed by: FAMILY MEDICINE

## 2022-11-07 PROCEDURE — 36415 COLL VENOUS BLD VENIPUNCTURE: CPT

## 2022-11-07 PROCEDURE — 83036 HEMOGLOBIN GLYCOSYLATED A1C: CPT | Performed by: FAMILY MEDICINE

## 2022-11-07 PROCEDURE — 85025 COMPLETE CBC W/AUTO DIFF WBC: CPT | Performed by: FAMILY MEDICINE

## 2022-11-22 RX ORDER — PRAVASTATIN SODIUM 20 MG
TABLET ORAL
Qty: 90 TABLET | Refills: 0 | Status: SHIPPED | OUTPATIENT
Start: 2022-11-22 | End: 2023-01-06

## 2022-12-08 DIAGNOSIS — E11.9 TYPE 2 DIABETES MELLITUS WITHOUT COMPLICATION, WITHOUT LONG-TERM CURRENT USE OF INSULIN: ICD-10-CM

## 2023-01-06 RX ORDER — PRAVASTATIN SODIUM 20 MG
TABLET ORAL
Qty: 90 TABLET | Refills: 0 | Status: SHIPPED | OUTPATIENT
Start: 2023-01-06

## 2023-01-08 RX ORDER — LISINOPRIL 10 MG/1
TABLET ORAL
Qty: 90 TABLET | Refills: 0 | Status: SHIPPED | OUTPATIENT
Start: 2023-01-08 | End: 2023-04-04

## 2023-01-16 ENCOUNTER — TELEPHONE (OUTPATIENT)
Dept: FAMILY MEDICINE CLINIC | Facility: CLINIC | Age: 73
End: 2023-01-16
Payer: MEDICARE

## 2023-02-23 ENCOUNTER — TELEPHONE (OUTPATIENT)
Dept: FAMILY MEDICINE CLINIC | Facility: CLINIC | Age: 73
End: 2023-02-23
Payer: MEDICARE

## 2023-02-23 NOTE — TELEPHONE ENCOUNTER
Caller: Janna Flowers    Relationship to patient: Self    Best call back number: [phone]    Patient is needing: PATIENT IS WANTING TO KNOW IF DR WOLFE WOULD BE WILLING TO TAKE HER SISTER IN LAW ALEKSANDR YING ON AS A NEW PATIENT.REQUESTING A CALLBACK FOR UPDATES.

## 2023-02-24 NOTE — TELEPHONE ENCOUNTER
Kiana tell Janna that because I am going to retire in the next 7 years or so I really am not taking on any new patients.  We will have some new doctors starting up soon and they will be taking new patients

## 2023-02-28 DIAGNOSIS — E11.9 TYPE 2 DIABETES MELLITUS WITHOUT COMPLICATION, WITHOUT LONG-TERM CURRENT USE OF INSULIN: Primary | ICD-10-CM

## 2023-03-03 ENCOUNTER — TELEPHONE (OUTPATIENT)
Dept: FAMILY MEDICINE CLINIC | Facility: CLINIC | Age: 73
End: 2023-03-03
Payer: MEDICARE

## 2023-03-03 NOTE — TELEPHONE ENCOUNTER
Caller: Janna Flowers    Relationship: Self    Best call back number: [phone] (Mobile)PREFERRED   [phone] (Home)       What was the call regarding: PATIENT STATED THAT HER PHARMACY ONLY HAS THE   insulin degludec (TRESIBA FLEXTOUCH) 100 UNIT/ML solution pen-injector injection  WAS WRITTEN FOR 15 UNITS DAILY     THE ONLY THING I SEE IS 20UNITS DAILY     CAN YOU CALL AND ADVISE THE PHARMACY WHICH DOSAGE IS CORRECT     IF YOU HAVE DECREASED TO 15 UNITS, PLEASE CALL PATIENT AND ADVISE       PHARMACY  [id number] - [place], [place] - [address] - [phone]  - [phone] FX  [phone]      Do you require a callback:

## 2023-03-06 ENCOUNTER — LAB (OUTPATIENT)
Dept: FAMILY MEDICINE CLINIC | Facility: CLINIC | Age: 73
End: 2023-03-06
Payer: MEDICARE

## 2023-03-06 DIAGNOSIS — E11.9 TYPE 2 DIABETES MELLITUS WITHOUT COMPLICATION, WITHOUT LONG-TERM CURRENT USE OF INSULIN: ICD-10-CM

## 2023-03-06 LAB
ALBUMIN SERPL-MCNC: 4.1 G/DL (ref 3.5–5.2)
ALBUMIN/GLOB SERPL: 1.5 G/DL
ALP SERPL-CCNC: 43 U/L (ref 39–117)
ALT SERPL W P-5'-P-CCNC: 12 U/L (ref 1–33)
ANION GAP SERPL CALCULATED.3IONS-SCNC: 8.9 MMOL/L (ref 5–15)
AST SERPL-CCNC: 13 U/L (ref 1–32)
BASOPHILS # BLD AUTO: 0.05 10*3/MM3 (ref 0–0.2)
BASOPHILS NFR BLD AUTO: 0.6 % (ref 0–1.5)
BILIRUB SERPL-MCNC: 0.3 MG/DL (ref 0–1.2)
BUN SERPL-MCNC: 19 MG/DL (ref 8–23)
BUN/CREAT SERPL: 27.9 (ref 7–25)
CALCIUM SPEC-SCNC: 9 MG/DL (ref 8.6–10.5)
CHLORIDE SERPL-SCNC: 104 MMOL/L (ref 98–107)
CO2 SERPL-SCNC: 29.1 MMOL/L (ref 22–29)
CREAT SERPL-MCNC: 0.68 MG/DL (ref 0.57–1)
DEPRECATED RDW RBC AUTO: 43.2 FL (ref 37–54)
EGFRCR SERPLBLD CKD-EPI 2021: 92.1 ML/MIN/1.73
EOSINOPHIL # BLD AUTO: 0.25 10*3/MM3 (ref 0–0.4)
EOSINOPHIL NFR BLD AUTO: 3.2 % (ref 0.3–6.2)
ERYTHROCYTE [DISTWIDTH] IN BLOOD BY AUTOMATED COUNT: 14.1 % (ref 12.3–15.4)
GLOBULIN UR ELPH-MCNC: 2.8 GM/DL
GLUCOSE SERPL-MCNC: 105 MG/DL (ref 65–99)
HBA1C MFR BLD: 7.6 % (ref 4.8–5.6)
HCT VFR BLD AUTO: 39.6 % (ref 34–46.6)
HGB BLD-MCNC: 12.6 G/DL (ref 12–15.9)
IMM GRANULOCYTES # BLD AUTO: 0.03 10*3/MM3 (ref 0–0.05)
IMM GRANULOCYTES NFR BLD AUTO: 0.4 % (ref 0–0.5)
LYMPHOCYTES # BLD AUTO: 3.08 10*3/MM3 (ref 0.7–3.1)
LYMPHOCYTES NFR BLD AUTO: 38.9 % (ref 19.6–45.3)
MCH RBC QN AUTO: 27.1 PG (ref 26.6–33)
MCHC RBC AUTO-ENTMCNC: 31.8 G/DL (ref 31.5–35.7)
MCV RBC AUTO: 85.2 FL (ref 79–97)
MONOCYTES # BLD AUTO: 0.81 10*3/MM3 (ref 0.1–0.9)
MONOCYTES NFR BLD AUTO: 10.2 % (ref 5–12)
NEUTROPHILS NFR BLD AUTO: 3.69 10*3/MM3 (ref 1.7–7)
NEUTROPHILS NFR BLD AUTO: 46.7 % (ref 42.7–76)
NRBC BLD AUTO-RTO: 0 /100 WBC (ref 0–0.2)
PLATELET # BLD AUTO: 347 10*3/MM3 (ref 140–450)
PMV BLD AUTO: 10.9 FL (ref 6–12)
POTASSIUM SERPL-SCNC: 4.2 MMOL/L (ref 3.5–5.2)
PROT SERPL-MCNC: 6.9 G/DL (ref 6–8.5)
RBC # BLD AUTO: 4.65 10*6/MM3 (ref 3.77–5.28)
SODIUM SERPL-SCNC: 142 MMOL/L (ref 136–145)
WBC NRBC COR # BLD: 7.91 10*3/MM3 (ref 3.4–10.8)

## 2023-03-06 PROCEDURE — 85025 COMPLETE CBC W/AUTO DIFF WBC: CPT | Performed by: FAMILY MEDICINE

## 2023-03-06 PROCEDURE — 83036 HEMOGLOBIN GLYCOSYLATED A1C: CPT | Performed by: FAMILY MEDICINE

## 2023-03-06 PROCEDURE — 80053 COMPREHEN METABOLIC PANEL: CPT | Performed by: FAMILY MEDICINE

## 2023-03-06 PROCEDURE — 36415 COLL VENOUS BLD VENIPUNCTURE: CPT

## 2023-03-13 ENCOUNTER — OFFICE VISIT (OUTPATIENT)
Dept: FAMILY MEDICINE CLINIC | Facility: CLINIC | Age: 73
End: 2023-03-13
Payer: MEDICARE

## 2023-03-13 VITALS
WEIGHT: 130 LBS | DIASTOLIC BLOOD PRESSURE: 70 MMHG | HEIGHT: 63 IN | OXYGEN SATURATION: 98 % | SYSTOLIC BLOOD PRESSURE: 130 MMHG | HEART RATE: 109 BPM | RESPIRATION RATE: 18 BRPM | BODY MASS INDEX: 23.04 KG/M2

## 2023-03-13 DIAGNOSIS — E78.2 MIXED HYPERLIPIDEMIA: ICD-10-CM

## 2023-03-13 DIAGNOSIS — M19.90 OSTEOARTHRITIS, UNSPECIFIED OSTEOARTHRITIS TYPE, UNSPECIFIED SITE: ICD-10-CM

## 2023-03-13 DIAGNOSIS — E11.9 TYPE 2 DIABETES MELLITUS WITHOUT COMPLICATION, WITHOUT LONG-TERM CURRENT USE OF INSULIN: Primary | ICD-10-CM

## 2023-03-13 DIAGNOSIS — I10 ESSENTIAL HYPERTENSION: ICD-10-CM

## 2023-03-13 DIAGNOSIS — M67.911 DISORDER OF RIGHT ROTATOR CUFF: ICD-10-CM

## 2023-03-13 NOTE — PROGRESS NOTES
Chief Complaint  Diabetes    Subjective      Janna Flowers presents to [facility]  History of Present Illness  For follow up on DM.  Diabetes  She has type 2 diabetes mellitus. MedicAlert identification noted. Pertinent negatives for hypoglycemia include no confusion, dizziness, headaches, hunger, mood changes, nervousness/anxiousness, pallor, seizures, sleepiness, speech difficulty, sweats or tremors. Associated symptoms include foot paresthesias. Pertinent negatives for diabetes include no blurred vision, no chest pain, no fatigue, no foot ulcerations, no polydipsia, no polyphagia, no polyuria, no visual change, no weakness and no weight loss. Pertinent negatives for hypoglycemia complications include no blackouts, no hospitalization, no nocturnal hypoglycemia, no required assistance and no required glucagon injection. Symptoms are stable. Pertinent negatives for diabetic complications include no CVA, heart disease, impotence, nephropathy, peripheral neuropathy, PVD or retinopathy. Risk factors for coronary artery disease include dyslipidemia and hypertension. Current diabetic treatment includes diet, insulin injections and oral agent (dual therapy). She is compliant with treatment most of the time. She is currently taking insulin pre-lunch. Insulin injections are given by patient. Rotation sites for injection include the abdominal wall. Her weight is fluctuating minimally. She is following a diabetic, generally healthy, high fiber and low salt diet. Meal planning includes none, avoidance of concentrated sweets and carbohydrate counting. She has not had a previous visit with a dietitian. She participates in exercise daily. She monitors blood glucose at home 1-2 x per day. She monitors urine at home <1 x per month. Blood glucose monitoring compliance is good. There is no change in her home blood glucose trend. Her breakfast blood glucose is taken between 6-7 am. Her breakfast  "blood glucose range is generally 70-90 mg/dl. Her dinner blood glucose is taken between 5-6 pm. Her dinner blood glucose range is generally 130-140 mg/dl. Her highest blood glucose is 130-140 mg/dl. Her overall blood glucose range is 140-180 mg/dl. She sees a podiatrist.Eye exam is current.     The patient presents today for a followup on her diabetes.    The patient states that her A1c went from 7.1 to 7.5 percent but that is taking into account Thanksgiving and [place], and she had 2 birthdays and only ate 1 piece of cake. The patient states that she is depressed. She states that her friend passed away from a heart attack. The patient states that she has been on 15 units of Tresiba since 04/2022 when she was taken off of glipizide. She states that she was in the hospital. The patient states that she takes 20 units of Tresiba since she was taken off of the glipizide because she was having vertigo. She states that she checks her blood glucose at home. The patient states that this morning it was 76 mg/dL. She states that she checks her blood glucose around 5:30 PM or 6:00 PM. The patient states that she gets her eyes checked. She states that she sees her dentist a couple of times. 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 She states that she has a rowing machine. The patient states that she quit going to the gym 5 years ago.    The patient states that she has pain in her right shoulder when she reaches high. She states that she can not go back up on her back. The patient states that it has been that way since the work of years.    Objective   Vital Signs:  /70 (BP Location: Right arm, Patient Position: Sitting, Cuff Size: Adult)   Pulse 109   Resp 18   Ht 160 cm (63\")   Wt 59 kg (130 lb)   SpO2 98%   BMI 23.03 kg/m²   Estimated body mass index is 23.03 kg/m² as calculated from the following:    Height as of this encounter: 160 cm (63\").    Weight as of this encounter: 59 kg (130 lb).       BMI is " within normal parameters. No other follow-up for BMI required.      Physical Exam  Constitutional:       Appearance: Normal appearance. She is well-developed and normal weight.   HENT:      Head: Normocephalic and atraumatic.      Right Ear: Tympanic membrane, ear canal and external ear normal.      Left Ear: Tympanic membrane, ear canal and external ear normal.      Nose: Nose normal.      Mouth/Throat:      Mouth: Mucous membranes are moist.      Pharynx: Oropharynx is clear. No oropharyngeal exudate.   Eyes:      Extraocular Movements: Extraocular movements intact.      Conjunctiva/sclera: Conjunctivae normal.      Pupils: Pupils are equal, round, and reactive to light.   Cardiovascular:      Rate and Rhythm: Normal rate and regular rhythm.      Pulses: Normal pulses.      Heart sounds: Normal heart sounds.   Pulmonary:      Effort: Pulmonary effort is normal.      Breath sounds: Normal breath sounds.   Abdominal:      General: Bowel sounds are normal.      Palpations: Abdomen is soft.   Musculoskeletal:         General: Normal range of motion.      Cervical back: Normal range of motion and neck supple.   Skin:     General: Skin is warm and dry.   Neurological:      General: No focal deficit present.      Mental Status: She is alert and oriented to person, place, and time. Mental status is at baseline.   Psychiatric:         Mood and Affect: Mood normal.         Behavior: Behavior normal.         Thought Content: Thought content normal.         Judgment: Judgment normal.          Assessment and Plan         1. Type 2 diabetes without complications  - The patient is an insulin-dependent diabetic who is here today for just a checkup. Her A1c was checked a week or so ago and is over 7 percent. That is a little higher than we have seen in the past, so we are going to have her come back in in a few months and recheck the A1c again. She is going to make some adjustments in her diet, but she is taking 20 units of  insulin and we are going to continue that for now. Most of it is probably diet related and we will see what her next A1c is and if it is still elevated, we will have to consider increasing the Tresiba insulin that she is on.    2. Rotator cuff disorder  - She is having some right shoulder pain that is compatible with supraspinatus tendinitis of the shoulder. I gave her some exercises to do and she is going to put some heat on it and we are going to see how she does with this first. If it does not get better in 4 to 6 weeks, then she can come back in for an injection of steroids. We could also send her to some physical therapy at that time if needed.    3. Osteoarthritis  - The patient has arthritis in several joints of her body including her back, knees, ankles, hands, not too much in her shoulders. The patient stays active and that is basically how she has been treating most of the arthritic pains that she has. She does a row machine 5 days a week, which is very helpful for the joints, but also for conditioning and cardiovascular health.    4. Essential hypertension  - Her blood pressure is good today and has been over the past year. She takes lisinopril 10 mg a day and her blood pressure was 130/70 mmHg today. We will continue lisinopril and we will be checking her liver or kidney function tests with blood work in a few weeks.    5. Mixed hyperlipidemia  - The patient follows a low carb diet and takes pravastatin 20 mg a day. We will be checking her lipid panel again in a few weeks, but she see here lipids. She had a lipid panel done about 4 months ago that was excellent with a total cholesterol 159, triglycerides 85, HDL 59, and her LDL was 84. That is a very low risk profile and is excellent, so we are going to continue her current medicines and diet.    We are going to do a repeat blood work in a few months and then I am going to see her back in the office for her Medicare wellness visit in 07/2023.      Follow  Up Return in about 6 months (around 9/13/2023) for Medicare Wellness with labs the week before.  Patient was given instructions and counseling regarding her condition or for health maintenance advice. Please see specific information pulled into the AVS if appropriate.       Answers for HPI/ROS submitted by the patient on 3/12/2023  What is the primary reason for your visit?: Diabetes    Transcribed from ambient dictation for Donnie Ng MD by Kailey Gonzalez.  03/13/23   11:33 EDT    Patient or patient representative verbalized consent to the visit recording.  I have personally performed the services described in this document as transcribed by the above individual, and it is both accurate and complete.  Donnie Ng MD  3/24/2023  08:05 EDT

## 2023-03-17 DIAGNOSIS — E11.9 TYPE 2 DIABETES MELLITUS WITHOUT COMPLICATION, WITHOUT LONG-TERM CURRENT USE OF INSULIN: ICD-10-CM

## 2023-04-04 RX ORDER — LISINOPRIL 10 MG/1
TABLET ORAL
Qty: 90 TABLET | Refills: 0 | Status: SHIPPED | OUTPATIENT
Start: 2023-04-04

## 2023-05-01 ENCOUNTER — TELEPHONE (OUTPATIENT)
Dept: FAMILY MEDICINE CLINIC | Facility: CLINIC | Age: 73
End: 2023-05-01
Payer: MEDICARE

## 2023-05-01 NOTE — TELEPHONE ENCOUNTER
Caller: Janna Flowers    Relationship: Self    Best call back number:[phone]     What medication are you requesting: ANTIBIOTIC/ALLERGIC TO PENICILLIN    What are your current symptoms: NAUSEA/VOMITING/YELLOW PHLEGM    How long have you been experiencing symptoms: 4/15    Have you had these symptoms before:    [x] Yes  [] No    Have you been treated for these symptoms before:   [x] Yes  [] No    If a prescription is needed, what is your preferred pharmacy and phone number: [facility] [id number] - [place], [place] - [address] - [phone]  - [phone]      Additional notes: PATIENT STATES THAT SHE LEFT AN AFTER HOURS MESSAGE YESTERDAY (Sunday) AT 2PM AND DID NOT HEAR BACK FROM ANYONE.

## 2023-05-01 NOTE — TELEPHONE ENCOUNTER
Spoke with patient and said her head is so clogged up that it is making her head spin, she is spitting up lots of yellow mucus and it has made her nauseated and she has thrown up from the nausea.  Said it is all in her head.  Can something be sent in for her?

## 2023-05-02 RX ORDER — CHLORCYCLIZINE HYDROCHLORIDE AND PSEUDOEPHEDRINE HYDROCHLORIDE 25; 60 MG/1; MG/1
1 TABLET ORAL EVERY 12 HOURS
Qty: 14 TABLET | Refills: 2 | Status: SHIPPED | OUTPATIENT
Start: 2023-05-02 | End: 2023-05-09

## 2023-05-02 RX ORDER — ONDANSETRON 8 MG/1
8 TABLET, ORALLY DISINTEGRATING ORAL EVERY 8 HOURS PRN
Qty: 5 TABLET | Refills: 2 | Status: SHIPPED | OUTPATIENT
Start: 2023-05-02

## 2023-05-20 ENCOUNTER — APPOINTMENT (OUTPATIENT)
Dept: GENERAL RADIOLOGY | Facility: HOSPITAL | Age: 73
End: 2023-05-20
Payer: MEDICARE

## 2023-05-20 ENCOUNTER — APPOINTMENT (OUTPATIENT)
Dept: CT IMAGING | Facility: HOSPITAL | Age: 73
End: 2023-05-20
Payer: MEDICARE

## 2023-05-20 ENCOUNTER — HOSPITAL ENCOUNTER (EMERGENCY)
Facility: HOSPITAL | Age: 73
Discharge: HOME | End: 2023-05-21
Attending: EMERGENCY MEDICINE | Admitting: EMERGENCY MEDICINE
Payer: MEDICARE

## 2023-05-20 DIAGNOSIS — R42 LIGHTHEADEDNESS: ICD-10-CM

## 2023-05-20 DIAGNOSIS — N39.0 ACUTE UTI: Primary | ICD-10-CM

## 2023-05-20 LAB
ALBUMIN SERPL-MCNC: 4.4 G/DL (ref 3.5–5.2)
ALBUMIN/GLOB SERPL: 1.5 G/DL
ALP SERPL-CCNC: 43 U/L (ref 39–117)
ALT SERPL W P-5'-P-CCNC: 10 U/L (ref 1–33)
ANION GAP SERPL CALCULATED.3IONS-SCNC: 14 MMOL/L (ref 5–15)
AST SERPL-CCNC: 16 U/L (ref 1–32)
B PARAPERT DNA SPEC QL NAA+PROBE: NOT DETECTED
B PERT DNA SPEC QL NAA+PROBE: NOT DETECTED
BASOPHILS # BLD AUTO: 0 10*3/MM3 (ref 0–0.2)
BASOPHILS NFR BLD AUTO: 0.4 % (ref 0–1.5)
BILIRUB SERPL-MCNC: 0.2 MG/DL (ref 0–1.2)
BUN SERPL-MCNC: 22 MG/DL (ref 8–23)
BUN/CREAT SERPL: 37.3 (ref 7–25)
C PNEUM DNA NPH QL NAA+NON-PROBE: NOT DETECTED
CALCIUM SPEC-SCNC: 9.8 MG/DL (ref 8.6–10.5)
CHLORIDE SERPL-SCNC: 102 MMOL/L (ref 98–107)
CO2 SERPL-SCNC: 26 MMOL/L (ref 22–29)
CREAT SERPL-MCNC: 0.59 MG/DL (ref 0.57–1)
DEPRECATED RDW RBC AUTO: 46.4 FL (ref 37–54)
EGFRCR SERPLBLD CKD-EPI 2021: 95.3 ML/MIN/1.73
EOSINOPHIL # BLD AUTO: 0 10*3/MM3 (ref 0–0.4)
EOSINOPHIL NFR BLD AUTO: 0.2 % (ref 0.3–6.2)
ERYTHROCYTE [DISTWIDTH] IN BLOOD BY AUTOMATED COUNT: 15.7 % (ref 12.3–15.4)
FLUAV SUBTYP SPEC NAA+PROBE: NOT DETECTED
FLUBV RNA ISLT QL NAA+PROBE: NOT DETECTED
GLOBULIN UR ELPH-MCNC: 2.9 GM/DL
GLUCOSE BLDC GLUCOMTR-MCNC: 79 MG/DL (ref 70–105)
GLUCOSE SERPL-MCNC: 86 MG/DL (ref 65–99)
HADV DNA SPEC NAA+PROBE: NOT DETECTED
HCOV 229E RNA SPEC QL NAA+PROBE: NOT DETECTED
HCOV HKU1 RNA SPEC QL NAA+PROBE: NOT DETECTED
HCOV NL63 RNA SPEC QL NAA+PROBE: NOT DETECTED
HCOV OC43 RNA SPEC QL NAA+PROBE: NOT DETECTED
HCT VFR BLD AUTO: 41.1 % (ref 34–46.6)
HGB BLD-MCNC: 12.9 G/DL (ref 12–15.9)
HMPV RNA NPH QL NAA+NON-PROBE: NOT DETECTED
HPIV1 RNA ISLT QL NAA+PROBE: NOT DETECTED
HPIV2 RNA SPEC QL NAA+PROBE: NOT DETECTED
HPIV3 RNA NPH QL NAA+PROBE: NOT DETECTED
HPIV4 P GENE NPH QL NAA+PROBE: NOT DETECTED
LIPASE SERPL-CCNC: 22 U/L (ref 13–60)
LYMPHOCYTES # BLD AUTO: 1.5 10*3/MM3 (ref 0.7–3.1)
LYMPHOCYTES NFR BLD AUTO: 13.3 % (ref 19.6–45.3)
M PNEUMO IGG SER IA-ACNC: NOT DETECTED
MAGNESIUM SERPL-MCNC: 2.2 MG/DL (ref 1.6–2.4)
MCH RBC QN AUTO: 26.7 PG (ref 26.6–33)
MCHC RBC AUTO-ENTMCNC: 31.4 G/DL (ref 31.5–35.7)
MCV RBC AUTO: 85 FL (ref 79–97)
MONOCYTES # BLD AUTO: 0.6 10*3/MM3 (ref 0.1–0.9)
MONOCYTES NFR BLD AUTO: 5.1 % (ref 5–12)
NEUTROPHILS NFR BLD AUTO: 81 % (ref 42.7–76)
NEUTROPHILS NFR BLD AUTO: 9.1 10*3/MM3 (ref 1.7–7)
NRBC BLD AUTO-RTO: 0 /100 WBC (ref 0–0.2)
NT-PROBNP SERPL-MCNC: <36 PG/ML (ref 0–900)
PLATELET # BLD AUTO: 339 10*3/MM3 (ref 140–450)
PMV BLD AUTO: 8.8 FL (ref 6–12)
POTASSIUM SERPL-SCNC: 4.2 MMOL/L (ref 3.5–5.2)
PROT SERPL-MCNC: 7.3 G/DL (ref 6–8.5)
RBC # BLD AUTO: 4.83 10*6/MM3 (ref 3.77–5.28)
RHINOVIRUS RNA SPEC NAA+PROBE: NOT DETECTED
RSV RNA NPH QL NAA+NON-PROBE: NOT DETECTED
SARS-COV-2 RNA NPH QL NAA+NON-PROBE: NOT DETECTED
SODIUM SERPL-SCNC: 142 MMOL/L (ref 136–145)
TROPONIN T SERPL HS-MCNC: 7 NG/L
TSH SERPL DL<=0.05 MIU/L-ACNC: 1.83 UIU/ML (ref 0.27–4.2)
WBC NRBC COR # BLD: 11.2 10*3/MM3 (ref 3.4–10.8)
WHOLE BLOOD HOLD COAG: NORMAL
WHOLE BLOOD HOLD SPECIMEN: NORMAL

## 2023-05-20 PROCEDURE — 82948 REAGENT STRIP/BLOOD GLUCOSE: CPT

## 2023-05-20 PROCEDURE — 93005 ELECTROCARDIOGRAM TRACING: CPT | Performed by: PHYSICIAN ASSISTANT

## 2023-05-20 PROCEDURE — 83735 ASSAY OF MAGNESIUM: CPT | Performed by: PHYSICIAN ASSISTANT

## 2023-05-20 PROCEDURE — 84484 ASSAY OF TROPONIN QUANT: CPT | Performed by: PHYSICIAN ASSISTANT

## 2023-05-20 PROCEDURE — 25010000002 ONDANSETRON PER 1 MG: Performed by: PHYSICIAN ASSISTANT

## 2023-05-20 PROCEDURE — 74177 CT ABD & PELVIS W/CONTRAST: CPT

## 2023-05-20 PROCEDURE — 83880 ASSAY OF NATRIURETIC PEPTIDE: CPT | Performed by: PHYSICIAN ASSISTANT

## 2023-05-20 PROCEDURE — 84443 ASSAY THYROID STIM HORMONE: CPT | Performed by: PHYSICIAN ASSISTANT

## 2023-05-20 PROCEDURE — 96374 THER/PROPH/DIAG INJ IV PUSH: CPT

## 2023-05-20 PROCEDURE — 25510000001 IOPAMIDOL PER 1 ML: Performed by: EMERGENCY MEDICINE

## 2023-05-20 PROCEDURE — 0202U NFCT DS 22 TRGT SARS-COV-2: CPT | Performed by: PHYSICIAN ASSISTANT

## 2023-05-20 PROCEDURE — 99284 EMERGENCY DEPT VISIT MOD MDM: CPT

## 2023-05-20 PROCEDURE — 71045 X-RAY EXAM CHEST 1 VIEW: CPT

## 2023-05-20 PROCEDURE — 83690 ASSAY OF LIPASE: CPT | Performed by: PHYSICIAN ASSISTANT

## 2023-05-20 PROCEDURE — 80053 COMPREHEN METABOLIC PANEL: CPT | Performed by: PHYSICIAN ASSISTANT

## 2023-05-20 PROCEDURE — 85025 COMPLETE CBC W/AUTO DIFF WBC: CPT | Performed by: PHYSICIAN ASSISTANT

## 2023-05-20 RX ORDER — SODIUM CHLORIDE 0.9 % (FLUSH) 0.9 %
10 SYRINGE (ML) INJECTION AS NEEDED
Status: DISCONTINUED | OUTPATIENT
Start: 2023-05-20 | End: 2023-05-21 | Stop reason: HOSPADM

## 2023-05-20 RX ORDER — ONDANSETRON 2 MG/ML
4 INJECTION INTRAMUSCULAR; INTRAVENOUS ONCE
Status: COMPLETED | OUTPATIENT
Start: 2023-05-20 | End: 2023-05-20

## 2023-05-20 RX ADMIN — IOPAMIDOL 100 ML: 755 INJECTION, SOLUTION INTRAVENOUS at 23:28

## 2023-05-20 RX ADMIN — ONDANSETRON 4 MG: 2 INJECTION INTRAMUSCULAR; INTRAVENOUS at 21:03

## 2023-05-20 RX ADMIN — SODIUM CHLORIDE 1000 ML: 9 INJECTION, SOLUTION INTRAVENOUS at 22:25

## 2023-05-20 NOTE — Clinical Note
Psychiatric EMERGENCY DEPARTMENT  [address] [place] [address]  Phone: [phone]    Janna Flowers was seen and treated in our emergency department on 5/20/2023.  She may return to work on 05/22/2023.         Thank you for choosing [facility].    Frantz Condon PA

## 2023-05-21 VITALS
DIASTOLIC BLOOD PRESSURE: 74 MMHG | OXYGEN SATURATION: 98 % | BODY MASS INDEX: 22.15 KG/M2 | HEIGHT: 63 IN | TEMPERATURE: 97.9 F | SYSTOLIC BLOOD PRESSURE: 128 MMHG | RESPIRATION RATE: 18 BRPM | WEIGHT: 125 LBS | HEART RATE: 68 BPM

## 2023-05-21 LAB — QT INTERVAL: 415 MS

## 2023-05-21 RX ORDER — CEFDINIR 300 MG/1
300 CAPSULE ORAL ONCE
Status: COMPLETED | OUTPATIENT
Start: 2023-05-21 | End: 2023-05-21

## 2023-05-21 RX ORDER — ONDANSETRON 4 MG/1
4 TABLET, ORALLY DISINTEGRATING ORAL EVERY 6 HOURS PRN
Qty: 30 TABLET | Refills: 0 | Status: SHIPPED | OUTPATIENT
Start: 2023-05-21

## 2023-05-21 RX ORDER — CEFDINIR 300 MG/1
300 CAPSULE ORAL 2 TIMES DAILY
Qty: 20 CAPSULE | Refills: 0 | Status: SHIPPED | OUTPATIENT
Start: 2023-05-21 | End: 2023-05-31

## 2023-05-21 RX ADMIN — CEFDINIR 300 MG: 300 CAPSULE ORAL at 01:42

## 2023-05-21 NOTE — DISCHARGE INSTRUCTIONS
Please take antibiotic to completion even if feeling better.  Please take Zofran as needed for nausea and vomiting.  Please come back to the ER if you spike a high fever or cannot keep liquids down by mouth despite Zofran as you will need reevaluation the time.  Please follow-up with your primary care provider in 1 week's time for symptom follow-up

## 2023-05-21 NOTE — ED PROVIDER NOTES
Subjective   History of Present Illness       Patient is a 73-year-old female comes in complaining of lightheadedness for the past 2 weeks.  Patient states that her symptoms will come and go.  Patient states that she has had bouts of nausea along with her lightheadedness.  Patient originally described this as dizziness in triage however patient denies any sensation of the room spinning or feeling of vertigo.  Patient does take meclizine and states she knows when she feels vertigo coming on her dizziness and this is not that feeling.  Patient states that she has had some vomiting and has been able to keep only a little amount of food and liquids down as well as medications over the past several days.  Patient states her sugar level has been fluctuating and running high and normal.  Patient denies any diarrhea, urinary symptoms or fever.  Patient does report some fullness in her left ear and discomfort but otherwise denies sore throat.  Patient does report a productive cough with yellow sputum as well.  Patient denies any syncopal episode, chest pain or shortness of breath or abdominal pain.    Review of Systems   Constitutional: Positive for fatigue. Negative for chills and fever.   HENT: Positive for congestion and ear pain. Negative for ear discharge, sore throat, tinnitus and trouble swallowing.    Eyes: Negative for photophobia, discharge and visual disturbance.   Respiratory: Positive for cough. Negative for shortness of breath.    Cardiovascular: Negative for chest pain and leg swelling.   Gastrointestinal: Positive for nausea and vomiting. Negative for abdominal pain and diarrhea.   Genitourinary: Negative for dysuria, flank pain, frequency and urgency.   Musculoskeletal: Negative for arthralgias and myalgias.   Skin: Negative for rash.   Neurological: Positive for light-headedness. Negative for dizziness and headaches.   Psychiatric/Behavioral: Negative for confusion.       Past Medical History:   Diagnosis  Date   • Allergic 1970    Broke out with a rashafter receiving. Penicillin shot   • Cancer     Mother and sister had breast cancer   • Cataract 2018    Aren’t ready to be removed   • Colon polyp 07/22   • Diabetes mellitus, type II 08/23/2011   • Environmental and seasonal allergies 07/23/2019   • Essential hypertension 08/23/2011   • Hay fever 01/07/2015   • HL (hearing loss) 2 years    Some  loss in left  ear   • Hyperlipidemia    • Osteoarthritis 07/22/2019   • Peripheral neuropathy 11/14/2016   • Vitamin B deficiency 11/27/2017       Allergies   Allergen Reactions   • Penicillin G Rash       Past Surgical History:   Procedure Laterality Date   • BACK SURGERY  2000   • BLADDER SUSPENSION     • COLONOSCOPY  7/25/2022   • SPINE SURGERY  10/1998    Back surgery L 5&6   • TUBAL ABDOMINAL LIGATION  1/13/1986       Family History   Problem Relation Age of Onset   • Cancer Mother    • Hyperlipidemia Father    • COPD Sister    • Cancer Sister    • Diabetes Brother    • Hyperlipidemia Brother    • Aneurysm Brother        Social History     Socioeconomic History   • Marital status:    Tobacco Use   • Smoking status: Never   • Smokeless tobacco: Never   Vaping Use   • Vaping Use: Never used   Substance and Sexual Activity   • Alcohol use: No   • Drug use: No   • Sexual activity: Yes     Partners: Male     Birth control/protection: Tubal ligation           Objective   Physical Exam  Vitals and nursing note reviewed.   Constitutional:       General: She is not in acute distress.     Appearance: She is well-developed. She is not diaphoretic.   HENT:      Head: Normocephalic and atraumatic.      Right Ear: Tympanic membrane, ear canal and external ear normal. There is no impacted cerumen.      Left Ear: Ear canal and external ear normal. There is impacted cerumen.      Nose: Nose normal.      Mouth/Throat:      Mouth: Mucous membranes are moist.   Eyes:      Extraocular Movements: Extraocular movements intact.       "Conjunctiva/sclera: Conjunctivae normal.      Pupils: Pupils are equal, round, and reactive to light.   Cardiovascular:      Rate and Rhythm: Normal rate and regular rhythm.      Pulses: Normal pulses.      Heart sounds: Normal heart sounds.      Comments: S1, S2 audible.  Pulmonary:      Effort: Pulmonary effort is normal. No respiratory distress.      Breath sounds: Normal breath sounds. No wheezing, rhonchi or rales.      Comments: On room air.  Abdominal:      General: Bowel sounds are normal. There is no distension.      Palpations: Abdomen is soft.      Tenderness: There is no abdominal tenderness. There is no guarding or rebound.   Musculoskeletal:         General: No tenderness or deformity. Normal range of motion.      Cervical back: Normal range of motion.   Skin:     General: Skin is warm.      Capillary Refill: Capillary refill takes less than 2 seconds.      Findings: No erythema or rash.   Neurological:      Mental Status: She is alert and oriented to person, place, and time.      Cranial Nerves: No cranial nerve deficit.   Psychiatric:         Mood and Affect: Mood normal.         Behavior: Behavior normal.         Procedures           ED Course  ED Course as of 05/21/23 0509   Sat May 20, 2023   2139 Chest x-ray  independently interpreted by myself shows no cardiomegaly, no pulmonary infiltrates apparent.  Official radiology read pending. [RL]   2218 Awaiting labs [RL]   Sun May 21, 2023   0022 Awaiting UA [RL]   0505 Orthostatics unremarkable per nursing and blood pressure in the 120s over 60s and standing and laying down and no change in heart rate. [RL]      ED Course User Index  [RL] Frantz Condon PA      /74 (BP Location: Right arm, Patient Position: Lying)   Pulse 68   Temp 97.9 °F (36.6 °C) (Oral)   Resp 18   Ht 160 cm (63\")   Wt 56.7 kg (125 lb)   SpO2 98%   BMI 22.14 kg/m²   Labs Reviewed   COMPREHENSIVE METABOLIC PANEL - Abnormal; Notable for the following components:       " Result Value    BUN/Creatinine Ratio 37.3 (*)     All other components within normal limits    Narrative:     GFR Normal >60  Chronic Kidney Disease <60  Kidney Failure <15    The GFR formula is only valid for adults with stable renal function between ages 18 and 70.   CBC WITH AUTO DIFFERENTIAL - Abnormal; Notable for the following components:    WBC 11.20 (*)     MCHC 31.4 (*)     RDW 15.7 (*)     Neutrophil % 81.0 (*)     Lymphocyte % 13.3 (*)     Eosinophil % 0.2 (*)     Neutrophils, Absolute 9.10 (*)     All other components within normal limits   RESPIRATORY PANEL PCR W/ COVID-19 (SARS-COV-2) TRISTEN/ROSA/DEYVI/PAD/COR/MAD/TONE IN-HOUSE, NP SWAB IN UTM/VTP, 3-4 HR TAT - Normal    Narrative:     In the setting of a positive respiratory panel with a viral infection PLUS a negative procalcitonin without other underlying concern for bacterial infection, consider observing off antibiotics or discontinuation of antibiotics and continue supportive care. If the respiratory panel is positive for atypical bacterial infection (Bordetella pertussis, Chlamydophila pneumoniae, or Mycoplasma pneumoniae), consider antibiotic de-escalation to target atypical bacterial infection.   BNP (IN-HOUSE) - Normal    Narrative:     Among patients with dyspnea, NT-proBNP is highly sensitive for the detection of acute congestive heart failure. In addition NT-proBNP of <300 pg/ml effectively rules out acute congestive heart failure with 99% negative predictive value.    Results may be falsely decreased if patient taking Biotin.     SINGLE HSTROPONIN T - Normal    Narrative:     High Sensitive Troponin T Reference Range:  <10.0 ng/L- Negative Female for AMI  <15.0 ng/L- Negative Male for AMI  >=10 - Abnormal Female indicating possible myocardial injury.  >=15 - Abnormal Male indicating possible myocardial injury.   Clinicians would have to utilize clinical acumen, EKG, Troponin, and serial changes to determine if it is an Acute Myocardial  Infarction or myocardial injury due to an underlying chronic condition.        LIPASE - Normal   TSH - Normal   MAGNESIUM - Normal   POCT GLUCOSE FINGERSTICK - Normal   RAINBOW DRAW    Narrative:     The following orders were created for panel order [facility] Draw.  Procedure                               Abnormality         Status                     ---------                               -----------         ------                     Green Top (Gel)[[id number]]                                  Final result               Lavender Top[[id number]]                                     Final result               Gold Top - SST[[id number]]                                   Final result               Light Blue Top[[id number]]                                   Final result                 Please view results for these tests on the individual orders.   POCT GLUCOSE FINGERSTICK   CBC AND DIFFERENTIAL    Narrative:     The following orders were created for panel order CBC & Differential.  Procedure                               Abnormality         Status                     ---------                               -----------         ------                     CBC Auto Differential[[id number]]        Abnormal            Final result                 Please view results for these tests on the individual orders.   GREEN TOP   LAVENDER TOP   GOLD TOP - SST   LIGHT BLUE TOP     CT Abdomen Pelvis With Contrast    Result Date: 5/20/2023  1.  Minor diffuse bilateral ureteral uroepithelial thickening.. Ascending urinary tract infection. 2.  Otherwise, no acute abnormality. Electronically signed by:  Victorina Levin M.D.  5/20/2023 9:51 PM Mountain Time                                         [facility]       Differential diagnosis: Hyperglycemia, viral gastritis, pneumonia, not meant to be an all-inclusive list    chart review:  Patient's last family care visit on 3/13/2023 with Dr. Ng for follow-up of type 2 diabetes, hypertension and  "hyperlipidemia.    EKG:  EKG independently interpreted by myself shows sinus rhythm 69 bpm, no ST elevation apparent.  When compared to previous EKG on 4/26/2022, no significant change.  Findings confirmed by attending provider above.    Imaging: CT abdomen pelvis with contrast shows minor diffuse bilateral ureteral thickening.  A sending urinary tract infection.  Otherwise no acute abnormality. Chest x-ray  independently interpreted by myself shows no cardiomegaly, no pulmonary infiltrates apparent.  Official radiology read pending.  Findings confirmed by attending provider above.    Labs: Lab work independently interpreted by myself shows CBC and CMP largely unremarkable for acute findings.  Respiratory viral panel negative.  Lipase normal, magnesium normal, BNP normal, initial troponin negative, TSH normal.    Vitals:  /74 (BP Location: Right arm, Patient Position: Lying)   Pulse 68   Temp 97.9 °F (36.6 °C) (Oral)   Resp 18   Ht 160 cm (63\")   Wt 56.7 kg (125 lb)   SpO2 98%   BMI 22.14 kg/m²     Medications given:    Medications   ondansetron (ZOFRAN) injection 4 mg (4 mg Intravenous Given 5/20/23 2103)   sodium chloride 0.9 % bolus 1,000 mL (0 mL Intravenous Stopped 5/20/23 2042)   iopamidol (ISOVUE-370) 76 % injection 100 mL (100 mL Intravenous Given 5/20/23 2328)   cefdinir (OMNICEF) capsule 300 mg (300 mg Oral Given 5/21/23 0142)       Procedures:  Not indicated  MDM: Patient is a 73-year-old female comes in complaining of lightheadedness and generalized fatigue.  IV established.  Lab work independently interpreted by myself shows CBC and CMP largely unremarkable for acute findings.  Respiratory viral panel negative.  Lipase normal, magnesium normal, BNP normal, initial troponin negative, TSH normal. CT abdomen pelvis with contrast shows minor diffuse bilateral ureteral thickening.  A sending urinary tract infection.  Otherwise no acute abnormality. Chest x-ray  independently interpreted by " myself shows no cardiomegaly, no pulmonary infiltrates apparent.  Official radiology read pending.  Findings confirmed by attending provider above. EKG independently interpreted by myself shows sinus rhythm 69 bpm, no ST elevation apparent.  When compared to previous EKG on 4/26/2022, no significant change.  Findings confirmed by attending provider above.  Patient was given Zofran and able to tolerate p.o. without issue.  Patient was given Omnicef for apparent UTI.  Patient states she cannot urinate throughout ER stay as she had just urinated prior to coming here.  Patient denies any history of trouble urinating.  I offered to the patient to wait for the urine however patient would like to defer this test at this time.  We will send patient home on 10-day course of Omnicef for possible ascending infection given CT results.  Left ear was irrigated, no signs of otitis media or or otitis externa on exam.  Patient and family at bedside given strict return precautions and voiced understanding. See full discharge instructions for further details.  Results and plan discussed with patient and is agreeable with plan.:      Final diagnoses:   Acute UTI   Lightheadedness       ED Disposition  ED Disposition     ED Disposition   Discharge    Condition   Stable    Comment   --             [facility] EMERGENCY DEPARTMENT  [address] [address] [phone]  Go in 1 day  As needed, If symptoms worsen    Donnie Ng MD  [address]  [facility] [place] [address] [phone]    Schedule an appointment as soon as possible for a visit in 3 days  for symptom follow up         Medication List      New Prescriptions    cefdinir 300 MG capsule  Commonly known as: OMNICEF  Take 1 capsule by mouth 2 (Two) Times a Day for 10 days.     ondansetron ODT 4 MG disintegrating tablet  Commonly known as: ZOFRAN-ODT  Place 1 tablet on the tongue Every 6 (Six) Hours As Needed for Nausea or  Vomiting.           Where to Get Your Medications      These medications were sent to [facility] [id number] - [place], [place] - [address] - [phone]  - [phone] FX  [address] [place] [address]    Phone: [phone]   · cefdinir 300 MG capsule  · ondansetron ODT 4 MG disintegrating tablet          Frantz Condon PA  05/21/23 0341

## 2023-05-21 NOTE — ED NOTES
Walked pt to restroom after asking for urine specimen. Pt attempted to provide urine specimen without success. Pa notified

## 2023-05-22 ENCOUNTER — TELEPHONE (OUTPATIENT)
Dept: FAMILY MEDICINE CLINIC | Facility: CLINIC | Age: 73
End: 2023-05-22
Payer: MEDICARE

## 2023-05-22 DIAGNOSIS — N39.0 URINARY TRACT INFECTION WITHOUT HEMATURIA, SITE UNSPECIFIED: Primary | ICD-10-CM

## 2023-05-22 NOTE — TELEPHONE ENCOUNTER
Gave message to patient at 2:47pm and scheduled a [facility] lab appt on 6/2/2023 at 9:10am and a hospital follow up appt with PMJ on 6/9/2023 at 1:30pm.

## 2023-05-22 NOTE — TELEPHONE ENCOUNTER
Caller: Janna Flowers    Relationship to patient: Self    Best call back number: [phone]    New or established patient?  [] New  [x] Established    Date of discharge: 05/20/2023    Facility [facility] from: [facility]    Diagnosis/Symptoms: VOMITING/UTI     Length of stay (If applicable): 5 HOURS     HUB COULD NOT WARM TRANSFER. PLEASE CALL PATIENT TO SCHEDULE ASAP.

## 2023-05-22 NOTE — TELEPHONE ENCOUNTER
She will need to follow up after she is done with 10day course of antiboitics. Its a UTI f/u so she will need to leave a urine sample 6/2/23 and see someone the week of 6/5/23

## 2023-05-26 DIAGNOSIS — E11.9 TYPE 2 DIABETES MELLITUS WITHOUT COMPLICATION, WITHOUT LONG-TERM CURRENT USE OF INSULIN: ICD-10-CM

## 2023-05-26 RX ORDER — PRAVASTATIN SODIUM 20 MG
TABLET ORAL
Qty: 90 TABLET | Refills: 0 | Status: SHIPPED | OUTPATIENT
Start: 2023-05-26

## 2023-06-02 ENCOUNTER — LAB (OUTPATIENT)
Dept: FAMILY MEDICINE CLINIC | Facility: CLINIC | Age: 73
End: 2023-06-02
Payer: MEDICARE

## 2023-06-02 DIAGNOSIS — E11.9 TYPE 2 DIABETES MELLITUS WITHOUT COMPLICATION, WITHOUT LONG-TERM CURRENT USE OF INSULIN: Primary | ICD-10-CM

## 2023-06-02 DIAGNOSIS — N39.0 URINARY TRACT INFECTION WITHOUT HEMATURIA, SITE UNSPECIFIED: ICD-10-CM

## 2023-06-02 DIAGNOSIS — M85.89 OSTEOPENIA OF MULTIPLE SITES: ICD-10-CM

## 2023-06-02 LAB
BILIRUB UR QL STRIP: NEGATIVE
CLARITY UR: CLEAR
COLOR UR: YELLOW
GLUCOSE UR STRIP-MCNC: ABNORMAL MG/DL
HGB UR QL STRIP.AUTO: NEGATIVE
KETONES UR QL STRIP: ABNORMAL
LEUKOCYTE ESTERASE UR QL STRIP.AUTO: NEGATIVE
NITRITE UR QL STRIP: NEGATIVE
PH UR STRIP.AUTO: 6.5 [PH] (ref 5–8)
PROT UR QL STRIP: NEGATIVE
SP GR UR STRIP: >=1.03 (ref 1–1.03)
UROBILINOGEN UR QL STRIP: ABNORMAL

## 2023-06-02 PROCEDURE — 81003 URINALYSIS AUTO W/O SCOPE: CPT | Performed by: FAMILY MEDICINE

## 2023-06-05 ENCOUNTER — LAB (OUTPATIENT)
Dept: FAMILY MEDICINE CLINIC | Facility: CLINIC | Age: 73
End: 2023-06-05
Payer: MEDICARE

## 2023-06-05 DIAGNOSIS — M85.89 OSTEOPENIA OF MULTIPLE SITES: ICD-10-CM

## 2023-06-05 DIAGNOSIS — E11.9 TYPE 2 DIABETES MELLITUS WITHOUT COMPLICATION, WITHOUT LONG-TERM CURRENT USE OF INSULIN: ICD-10-CM

## 2023-06-05 LAB
25(OH)D3 SERPL-MCNC: 45.4 NG/ML (ref 30–100)
ALBUMIN SERPL-MCNC: 4.3 G/DL (ref 3.5–5.2)
ALBUMIN/GLOB SERPL: 1.5 G/DL
ALP SERPL-CCNC: 36 U/L (ref 39–117)
ALT SERPL W P-5'-P-CCNC: 12 U/L (ref 1–33)
ANION GAP SERPL CALCULATED.3IONS-SCNC: 11.7 MMOL/L (ref 5–15)
AST SERPL-CCNC: 20 U/L (ref 1–32)
BASOPHILS # BLD AUTO: 0.05 10*3/MM3 (ref 0–0.2)
BASOPHILS NFR BLD AUTO: 0.7 % (ref 0–1.5)
BILIRUB SERPL-MCNC: 0.2 MG/DL (ref 0–1.2)
BUN SERPL-MCNC: 14 MG/DL (ref 8–23)
BUN/CREAT SERPL: 20.6 (ref 7–25)
CALCIUM SPEC-SCNC: 9.7 MG/DL (ref 8.6–10.5)
CHLORIDE SERPL-SCNC: 101 MMOL/L (ref 98–107)
CHOLEST SERPL-MCNC: 160 MG/DL (ref 0–200)
CO2 SERPL-SCNC: 27.3 MMOL/L (ref 22–29)
CREAT SERPL-MCNC: 0.68 MG/DL (ref 0.57–1)
DEPRECATED RDW RBC AUTO: 43.1 FL (ref 37–54)
EGFRCR SERPLBLD CKD-EPI 2021: 92.1 ML/MIN/1.73
EOSINOPHIL # BLD AUTO: 0.22 10*3/MM3 (ref 0–0.4)
EOSINOPHIL NFR BLD AUTO: 2.9 % (ref 0.3–6.2)
ERYTHROCYTE [DISTWIDTH] IN BLOOD BY AUTOMATED COUNT: 14.3 % (ref 12.3–15.4)
GLOBULIN UR ELPH-MCNC: 2.8 GM/DL
GLUCOSE SERPL-MCNC: 106 MG/DL (ref 65–99)
HBA1C MFR BLD: 7.4 % (ref 4.8–5.6)
HCT VFR BLD AUTO: 40.6 % (ref 34–46.6)
HDLC SERPL-MCNC: 59 MG/DL (ref 40–60)
HGB BLD-MCNC: 12.8 G/DL (ref 12–15.9)
IMM GRANULOCYTES # BLD AUTO: 0.01 10*3/MM3 (ref 0–0.05)
IMM GRANULOCYTES NFR BLD AUTO: 0.1 % (ref 0–0.5)
LDLC SERPL CALC-MCNC: 82 MG/DL (ref 0–100)
LDLC/HDLC SERPL: 1.36 {RATIO}
LYMPHOCYTES # BLD AUTO: 3.01 10*3/MM3 (ref 0.7–3.1)
LYMPHOCYTES NFR BLD AUTO: 40.3 % (ref 19.6–45.3)
MCH RBC QN AUTO: 26.7 PG (ref 26.6–33)
MCHC RBC AUTO-ENTMCNC: 31.5 G/DL (ref 31.5–35.7)
MCV RBC AUTO: 84.8 FL (ref 79–97)
MONOCYTES # BLD AUTO: 0.7 10*3/MM3 (ref 0.1–0.9)
MONOCYTES NFR BLD AUTO: 9.4 % (ref 5–12)
NEUTROPHILS NFR BLD AUTO: 3.48 10*3/MM3 (ref 1.7–7)
NEUTROPHILS NFR BLD AUTO: 46.6 % (ref 42.7–76)
NRBC BLD AUTO-RTO: 0 /100 WBC (ref 0–0.2)
PLATELET # BLD AUTO: 340 10*3/MM3 (ref 140–450)
PMV BLD AUTO: 11.9 FL (ref 6–12)
POTASSIUM SERPL-SCNC: 5.4 MMOL/L (ref 3.5–5.2)
PROT SERPL-MCNC: 7.1 G/DL (ref 6–8.5)
RBC # BLD AUTO: 4.79 10*6/MM3 (ref 3.77–5.28)
SODIUM SERPL-SCNC: 140 MMOL/L (ref 136–145)
TRIGL SERPL-MCNC: 105 MG/DL (ref 0–150)
TSH SERPL DL<=0.05 MIU/L-ACNC: 2.75 UIU/ML (ref 0.27–4.2)
VLDLC SERPL-MCNC: 19 MG/DL (ref 5–40)
WBC NRBC COR # BLD: 7.47 10*3/MM3 (ref 3.4–10.8)

## 2023-06-05 PROCEDURE — 85025 COMPLETE CBC W/AUTO DIFF WBC: CPT | Performed by: FAMILY MEDICINE

## 2023-06-05 PROCEDURE — 36415 COLL VENOUS BLD VENIPUNCTURE: CPT

## 2023-06-05 PROCEDURE — 84443 ASSAY THYROID STIM HORMONE: CPT | Performed by: FAMILY MEDICINE

## 2023-06-05 PROCEDURE — 83036 HEMOGLOBIN GLYCOSYLATED A1C: CPT | Performed by: FAMILY MEDICINE

## 2023-06-05 PROCEDURE — 80053 COMPREHEN METABOLIC PANEL: CPT | Performed by: FAMILY MEDICINE

## 2023-06-05 PROCEDURE — 82306 VITAMIN D 25 HYDROXY: CPT | Performed by: FAMILY MEDICINE

## 2023-06-05 PROCEDURE — 80061 LIPID PANEL: CPT | Performed by: FAMILY MEDICINE

## 2023-06-09 PROBLEM — B34.9 VIRAL SYNDROME: Status: ACTIVE | Noted: 2023-06-09

## 2023-06-09 PROBLEM — N39.0 UTI (URINARY TRACT INFECTION): Status: ACTIVE | Noted: 2023-06-09

## 2023-06-12 ENCOUNTER — TELEPHONE (OUTPATIENT)
Dept: FAMILY MEDICINE CLINIC | Facility: CLINIC | Age: 73
End: 2023-06-12
Payer: MEDICARE

## 2023-06-12 DIAGNOSIS — N30.01 ACUTE CYSTITIS WITH HEMATURIA: Primary | ICD-10-CM

## 2023-06-12 RX ORDER — BLOOD SUGAR DIAGNOSTIC
STRIP MISCELLANEOUS
Qty: 300 EACH | Refills: 1 | Status: SHIPPED | OUTPATIENT
Start: 2023-06-12 | End: 2023-06-13 | Stop reason: SDUPTHER

## 2023-06-12 NOTE — TELEPHONE ENCOUNTER
Have Janna come by the office tomorrow for a CBC CMP and a UA with culture and sensitivity.  We will see what is going on with the labs and then decide on treatment or not.

## 2023-06-12 NOTE — TELEPHONE ENCOUNTER
Caller: Janna Flowers     Relationship: SELF      Best call back number: [phone]    What is your medical concern?     PATIENT IS FEELING LIGHT HEADINESS, NAUSEA , HEAD IS CLOGGED UP ON THE LEFT SIDE      How long has this issue been going on? 6/10/2023    ADDITIONAL INFORMATION:    PATIENT DID NOT LIKE THE SIDE EFFECTS OF THE (STAHIST AD) MEDICATION THAT WAS GIVEN TO HER.    SHE IS BACK TO FEELING LIKE SHE DID BEFORE.  SHE STATED THAT SHE IS HOPING THAT THE UTI IS NOT BACK.    SHE WOULD LIKE A CALL BACK PLEASE.

## 2023-06-13 ENCOUNTER — TELEPHONE (OUTPATIENT)
Dept: FAMILY MEDICINE CLINIC | Facility: CLINIC | Age: 73
End: 2023-06-13
Payer: MEDICARE

## 2023-06-13 RX ORDER — BLOOD SUGAR DIAGNOSTIC
1 STRIP MISCELLANEOUS 2 TIMES DAILY
Qty: 300 EACH | Refills: 1 | Status: SHIPPED | OUTPATIENT
Start: 2023-06-13

## 2023-06-13 RX ORDER — PEN NEEDLE, DIABETIC 31 GX5/16"
NEEDLE, DISPOSABLE MISCELLANEOUS
Qty: 100 EACH | Refills: 1 | Status: SHIPPED | OUTPATIENT
Start: 2023-06-13

## 2023-06-13 NOTE — TELEPHONE ENCOUNTER
Gave message to patient at 10:35am.  Patient said she just had labs done on 6/5 (8 days ago) and just saw you this past Friday.  Does she really need to come back in for two of the same labs and a U/A?

## 2023-06-13 NOTE — TELEPHONE ENCOUNTER
Caller: Janna Flowers    Relationship: Self    Best call back number: [phone]     What is the best time to reach you: ANY TIME    Who are you requesting to speak with (clinical staff, provider,  specific staff member): CLINICAL STAFF    What was the call regarding: PATIENT STATES HER ACCUCHECK GUIDE TEST STRIPS NEED A DIAGNOSIS CODE SENT TO PHARMACY FOR MEDICARE TO COVER BEFORE THEY CAN FILL PRESCRIPTION.    Is it okay if the provider responds through [facility]: NO, PHONE CALLL

## 2023-06-13 NOTE — TELEPHONE ENCOUNTER
Caller: Janna Flowers    Relationship: Self    Best call back number: [phone]     What was the call regarding: PATIENT IS REQUESTING A STANDING ORDER FOR THE CLARITIN D BE SENT OVER TO [facility] [id number] - [place], [place] - [address] - [phone]  - [phone] FX.     PLEASE CALL TO DISCUSS IF NEEDED.

## 2023-06-13 NOTE — TELEPHONE ENCOUNTER
Shanon it is entirely up to her.!!    Yes she was seen and labs were done but then she calls back a day or 2 later and says she is worse and feels horrible and wants to know what to do.  If she feels that bad we need to repeat the test so we can see what is changed so we can know what to do!!!   If she really does not feel that bad or she is not concerned then she does not have to do anything.

## 2023-07-12 ENCOUNTER — OFFICE (AMBULATORY)
Dept: URBAN - METROPOLITAN AREA CLINIC 64 | Facility: CLINIC | Age: 73
End: 2023-07-12
Payer: COMMERCIAL

## 2023-07-12 VITALS
HEART RATE: 83 BPM | HEIGHT: 63 IN | SYSTOLIC BLOOD PRESSURE: 119 MMHG | WEIGHT: 126 LBS | DIASTOLIC BLOOD PRESSURE: 81 MMHG

## 2023-07-12 DIAGNOSIS — R11.2 NAUSEA WITH VOMITING, UNSPECIFIED: ICD-10-CM

## 2023-07-12 PROCEDURE — 99213 OFFICE O/P EST LOW 20 MIN: CPT | Performed by: NURSE PRACTITIONER

## 2023-07-12 RX ORDER — OMEPRAZOLE 20 MG/1
20 CAPSULE, DELAYED RELEASE ORAL
Qty: 90 | Refills: 3 | Status: COMPLETED
End: 2023-07-12

## 2023-08-12 DIAGNOSIS — E11.9 TYPE 2 DIABETES MELLITUS WITHOUT COMPLICATION, WITHOUT LONG-TERM CURRENT USE OF INSULIN: ICD-10-CM

## 2023-08-14 RX ORDER — PRAVASTATIN SODIUM 20 MG
TABLET ORAL
Qty: 90 TABLET | Refills: 0 | Status: SHIPPED | OUTPATIENT
Start: 2023-08-14

## 2023-10-02 RX ORDER — LISINOPRIL 10 MG/1
TABLET ORAL
Qty: 90 TABLET | Refills: 0 | Status: SHIPPED | OUTPATIENT
Start: 2023-10-02

## 2023-10-17 RX ORDER — INSULIN DEGLUDEC INJECTION 100 U/ML
20 INJECTION, SOLUTION SUBCUTANEOUS DAILY
Qty: 15 ML | Refills: 3 | Status: SHIPPED | OUTPATIENT
Start: 2023-10-17

## 2023-10-27 ENCOUNTER — LAB (OUTPATIENT)
Dept: FAMILY MEDICINE CLINIC | Facility: CLINIC | Age: 73
End: 2023-10-27
Payer: MEDICARE

## 2023-10-27 DIAGNOSIS — N30.01 ACUTE CYSTITIS WITH HEMATURIA: ICD-10-CM

## 2023-10-27 LAB
ALBUMIN SERPL-MCNC: 4 G/DL (ref 3.5–5.2)
ALBUMIN/GLOB SERPL: 1.5 G/DL
ALP SERPL-CCNC: 41 U/L (ref 39–117)
ALT SERPL W P-5'-P-CCNC: 11 U/L (ref 1–33)
ANION GAP SERPL CALCULATED.3IONS-SCNC: 10 MMOL/L (ref 5–15)
AST SERPL-CCNC: 18 U/L (ref 1–32)
BACTERIA UR QL AUTO: NORMAL /HPF
BASOPHILS # BLD AUTO: 0.04 10*3/MM3 (ref 0–0.2)
BASOPHILS NFR BLD AUTO: 0.6 % (ref 0–1.5)
BILIRUB SERPL-MCNC: 0.2 MG/DL (ref 0–1.2)
BILIRUB UR QL STRIP: NEGATIVE
BUN SERPL-MCNC: 19 MG/DL (ref 8–23)
BUN/CREAT SERPL: 26.8 (ref 7–25)
CALCIUM SPEC-SCNC: 9.3 MG/DL (ref 8.6–10.5)
CHLORIDE SERPL-SCNC: 103 MMOL/L (ref 98–107)
CLARITY UR: CLEAR
CO2 SERPL-SCNC: 28 MMOL/L (ref 22–29)
COLOR UR: YELLOW
CREAT SERPL-MCNC: 0.71 MG/DL (ref 0.57–1)
DEPRECATED RDW RBC AUTO: 41 FL (ref 37–54)
EGFRCR SERPLBLD CKD-EPI 2021: 89.9 ML/MIN/1.73
EOSINOPHIL # BLD AUTO: 0.26 10*3/MM3 (ref 0–0.4)
EOSINOPHIL NFR BLD AUTO: 3.6 % (ref 0.3–6.2)
ERYTHROCYTE [DISTWIDTH] IN BLOOD BY AUTOMATED COUNT: 13.4 % (ref 12.3–15.4)
GLOBULIN UR ELPH-MCNC: 2.6 GM/DL
GLUCOSE SERPL-MCNC: 84 MG/DL (ref 65–99)
GLUCOSE UR STRIP-MCNC: ABNORMAL MG/DL
HCT VFR BLD AUTO: 39.6 % (ref 34–46.6)
HGB BLD-MCNC: 12.7 G/DL (ref 12–15.9)
HGB UR QL STRIP.AUTO: NEGATIVE
HOLD SPECIMEN: NORMAL
HYALINE CASTS UR QL AUTO: NORMAL /LPF
IMM GRANULOCYTES # BLD AUTO: 0.02 10*3/MM3 (ref 0–0.05)
IMM GRANULOCYTES NFR BLD AUTO: 0.3 % (ref 0–0.5)
KETONES UR QL STRIP: NEGATIVE
LEUKOCYTE ESTERASE UR QL STRIP.AUTO: ABNORMAL
LYMPHOCYTES # BLD AUTO: 3.24 10*3/MM3 (ref 0.7–3.1)
LYMPHOCYTES NFR BLD AUTO: 45 % (ref 19.6–45.3)
MCH RBC QN AUTO: 27.1 PG (ref 26.6–33)
MCHC RBC AUTO-ENTMCNC: 32.1 G/DL (ref 31.5–35.7)
MCV RBC AUTO: 84.6 FL (ref 79–97)
MONOCYTES # BLD AUTO: 0.75 10*3/MM3 (ref 0.1–0.9)
MONOCYTES NFR BLD AUTO: 10.4 % (ref 5–12)
NEUTROPHILS NFR BLD AUTO: 2.89 10*3/MM3 (ref 1.7–7)
NEUTROPHILS NFR BLD AUTO: 40.1 % (ref 42.7–76)
NITRITE UR QL STRIP: NEGATIVE
NRBC BLD AUTO-RTO: 0 /100 WBC (ref 0–0.2)
PH UR STRIP.AUTO: 6 [PH] (ref 5–8)
PLATELET # BLD AUTO: 369 10*3/MM3 (ref 140–450)
PMV BLD AUTO: 11.1 FL (ref 6–12)
POTASSIUM SERPL-SCNC: 4.5 MMOL/L (ref 3.5–5.2)
PROT SERPL-MCNC: 6.6 G/DL (ref 6–8.5)
PROT UR QL STRIP: NEGATIVE
RBC # BLD AUTO: 4.68 10*6/MM3 (ref 3.77–5.28)
RBC # UR STRIP: NORMAL /HPF
REF LAB TEST METHOD: NORMAL
SODIUM SERPL-SCNC: 141 MMOL/L (ref 136–145)
SP GR UR STRIP: 1.02 (ref 1–1.03)
SQUAMOUS #/AREA URNS HPF: NORMAL /HPF
UROBILINOGEN UR QL STRIP: ABNORMAL
WBC # UR STRIP: NORMAL /HPF
WBC NRBC COR # BLD: 7.2 10*3/MM3 (ref 3.4–10.8)

## 2023-10-27 PROCEDURE — 36415 COLL VENOUS BLD VENIPUNCTURE: CPT

## 2023-10-27 PROCEDURE — 80053 COMPREHEN METABOLIC PANEL: CPT | Performed by: FAMILY MEDICINE

## 2023-10-27 PROCEDURE — 85025 COMPLETE CBC W/AUTO DIFF WBC: CPT | Performed by: FAMILY MEDICINE

## 2023-10-27 PROCEDURE — 81001 URINALYSIS AUTO W/SCOPE: CPT | Performed by: FAMILY MEDICINE

## 2023-11-02 ENCOUNTER — OFFICE VISIT (OUTPATIENT)
Dept: FAMILY MEDICINE CLINIC | Facility: CLINIC | Age: 73
End: 2023-11-02
Payer: MEDICARE

## 2023-11-02 VITALS
WEIGHT: 132 LBS | OXYGEN SATURATION: 97 % | RESPIRATION RATE: 16 BRPM | SYSTOLIC BLOOD PRESSURE: 134 MMHG | HEART RATE: 80 BPM | BODY MASS INDEX: 23.39 KG/M2 | DIASTOLIC BLOOD PRESSURE: 76 MMHG | HEIGHT: 63 IN

## 2023-11-02 DIAGNOSIS — M19.90 OSTEOARTHRITIS, UNSPECIFIED OSTEOARTHRITIS TYPE, UNSPECIFIED SITE: ICD-10-CM

## 2023-11-02 DIAGNOSIS — G43.501 PERSISTENT MIGRAINE AURA WITHOUT CEREBRAL INFARCTION AND WITH STATUS MIGRAINOSUS, NOT INTRACTABLE: ICD-10-CM

## 2023-11-02 DIAGNOSIS — E78.2 MIXED HYPERLIPIDEMIA: ICD-10-CM

## 2023-11-02 DIAGNOSIS — E11.9 TYPE 2 DIABETES MELLITUS WITHOUT COMPLICATION, WITHOUT LONG-TERM CURRENT USE OF INSULIN: ICD-10-CM

## 2023-11-02 DIAGNOSIS — I10 ESSENTIAL HYPERTENSION: Primary | ICD-10-CM

## 2023-11-02 NOTE — PROGRESS NOTES
"Chief Complaint  Diabetes, Hypertension, and Hyperlipidemia    Subjective        Janna Flowers presents to [facility]  History of Present Illness    Objective   Vital Signs:  /76   Pulse 80   Resp 16   Ht 160 cm (63\")   Wt 59.9 kg (132 lb)   SpO2 97%   BMI 23.38 kg/m²   Estimated body mass index is 23.38 kg/m² as calculated from the following:    Height as of this encounter: 160 cm (63\").    Weight as of this encounter: 59.9 kg (132 lb).       BMI is within normal parameters. No other follow-up for BMI required.      Physical Exam  Constitutional:       Appearance: Normal appearance. She is well-developed and normal weight.   HENT:      Head: Normocephalic and atraumatic.      Right Ear: Tympanic membrane, ear canal and external ear normal.      Left Ear: Tympanic membrane, ear canal and external ear normal.      Nose: Nose normal.      Mouth/Throat:      Mouth: Mucous membranes are moist.      Pharynx: Oropharynx is clear. No oropharyngeal exudate.   Eyes:      Extraocular Movements: Extraocular movements intact.      Conjunctiva/sclera: Conjunctivae normal.      Pupils: Pupils are equal, round, and reactive to light.   Cardiovascular:      Rate and Rhythm: Normal rate and regular rhythm.      Pulses: Normal pulses.      Heart sounds: Normal heart sounds.   Pulmonary:      Effort: Pulmonary effort is normal.      Breath sounds: Normal breath sounds.   Abdominal:      General: Bowel sounds are normal.      Palpations: Abdomen is soft.   Musculoskeletal:         General: Normal range of motion.      Cervical back: Normal range of motion and neck supple.   Skin:     General: Skin is warm and dry.   Neurological:      General: No focal deficit present.      Mental Status: She is alert and oriented to person, place, and time. Mental status is at baseline.   Psychiatric:         Mood and Affect: Mood normal.         Behavior: Behavior normal.         Thought Content: " Thought content normal.         Judgment: Judgment normal.      Result Review :                   Assessment and Plan   There are no diagnoses linked to this encounter.       Follow Up   No follow-ups on file.  Patient was given instructions and counseling regarding her condition or for health maintenance advice. Please see specific information pulled into the AVS if appropriate.

## 2023-11-02 NOTE — PROGRESS NOTES
"Chief Complaint  Diabetes, Hypertension, and Hyperlipidemia    Subjective        Janna Flowers presents to [facility]  Diabetes    Hypertension    Hyperlipidemia    The patient had laboratory studies done on 10/27/2023.  She notes that the hemoglobin A1c was not drawn.  She is working on her diet.    She is needing a refill on lisinopril, omeprazole, pravastatin, and Prilosec.    The patient consulted with a gastroenterologist and discussed her symptoms.  An EGD was not warranted.    She inquires if she has a diagnosis of chronic kidney disease.     The Farxiga is costing the patient $335.00 for her insurance copay.  She would like to switch to a less costly medication.  The patient is attempting to enroll in [facility] for her health insurance.  The patient is concerned about her risk of stroke discontinuing Farxiga.  She continues to take metformin.  The patient has not undergone a vascular screening nor a CT scan coronary artery calcium score.    The patient was experiencing dizziness and emesis intermittently for 2 months.  She would wake up in the morning and feel lightheaded, dizzy, and nauseous.  Dr. Edward prescribed medication for acid reflux.  The patient denies vertigo.  She would eat breakfast and be asymptomatic and then suddenly she would have dizziness and nausea.  The patient would need to lie down for 2 to 3 hours.  She does not have a history of migraines, however, Nurtec did help.      The patient is retired.    Objective   Vital Signs:  /76   Pulse 80   Resp 16   Ht 160 cm (63\")   Wt 59.9 kg (132 lb)   SpO2 97%   BMI 23.38 kg/m²   Estimated body mass index is 23.38 kg/m² as calculated from the following:    Height as of this encounter: 160 cm (63\").    Weight as of this encounter: 59.9 kg (132 lb).       BMI is within normal parameters. No other follow-up for BMI required.      Physical Exam  Constitutional:       Appearance: Normal " appearance. She is well-developed and normal weight.   HENT:      Head: Normocephalic and atraumatic.      Right Ear: Tympanic membrane, ear canal and external ear normal.      Left Ear: Tympanic membrane, ear canal and external ear normal.      Nose: Nose normal.      Mouth/Throat:      Mouth: Mucous membranes are moist.      Pharynx: Oropharynx is clear. No oropharyngeal exudate.   Eyes:      Extraocular Movements: Extraocular movements intact.      Conjunctiva/sclera: Conjunctivae normal.      Pupils: Pupils are equal, round, and reactive to light.   Cardiovascular:      Rate and Rhythm: Normal rate and regular rhythm.      Pulses: Normal pulses.      Heart sounds: Normal heart sounds.   Pulmonary:      Effort: Pulmonary effort is normal.      Breath sounds: Normal breath sounds.   Abdominal:      General: Bowel sounds are normal.      Palpations: Abdomen is soft.   Musculoskeletal:         General: Normal range of motion.      Cervical back: Normal range of motion and neck supple.   Skin:     General: Skin is warm and dry.   Neurological:      General: No focal deficit present.      Mental Status: She is alert and oriented to person, place, and time. Mental status is at baseline.   Psychiatric:         Mood and Affect: Mood normal.         Behavior: Behavior normal.         Thought Content: Thought content normal.         Judgment: Judgment normal.        Result Review :  `         Assessment and Plan     1.   Essential hypertension  - Ms. Flowers is a 73-year-old white female followed in the office for general medical care. She's under treatment for hypertension, but her blood pressure today is excellent. So, we're just going to continue her current medications.     2.  Mixed hyperlipidemia  - The patient's lipid panel was not checked last week when she had blood work done. I'm not sure what happened, but that was supposed to be checked, so I'm going to send her down this evening to get the blood work for her  lipid panel and then we will make adjustments in her medicines if needed.     3.  Type 2 diabetes mellitus without complications  -Her last hemoglobin A1c was 7.6 percent and that was done about 3 months ago. For some reason, she had blood work done yesterday and they forgot to do the hemoglobin A1c, or it wasn't ordered correctly. I'm going to go ahead and send her back down today for a hemoglobin A1c and then we' will adjust in her medicine if she wants to. She has been placed on Farxiga, but the cost is over $200 a month for her and I'm not sure we can keep her on this from the expense side. I think we will just push her insulin up a little bit higher. If she and her  decide they want to pay for the Farxiga, I will keep them on it.    4.  Persistent migraine with aura   - The patient is having auras that consist of some dizziness and visual changes followed by headaches. I really think these are migraines, even though it's unusual for her to get a migraine like this, this late in life. We'll see how this goes for a while and we'll use Nurtec which helps her break these headaches when they do occur.     5.  Osteoarthritis  - She has arthritis in her back and bilateral knees, hands, and hips. She is functioning at a remarkably prominent level.  She moves well. She stays active and I don't think she really needs to take anything extra right now.      Follow Up   Return in about 6 months (around 5/2/2024) for Medicare Wellness-6 mos.  Patient was given instructions and counseling regarding her condition or for health maintenance advice. Please see specific information pulled into the AVS if appropriate.       Transcribed from ambient dictation for Donnie Ng MD by Angelica Brantley.  11/02/23   19:07 EDT    Patient or patient representative verbalized consent to the visit recording.  I have personally performed the services described in this document as transcribed by the above individual, and it is both  accurate and complete.  Donnie Ng MD  11/5/2023  10:20 EST

## 2023-11-05 RX ORDER — LISINOPRIL 10 MG/1
10 TABLET ORAL DAILY
Qty: 90 TABLET | Refills: 3 | Status: SHIPPED | OUTPATIENT
Start: 2023-11-05 | End: 2024-02-03

## 2023-11-05 RX ORDER — PRAVASTATIN SODIUM 20 MG
20 TABLET ORAL DAILY
Qty: 90 TABLET | Refills: 3 | Status: SHIPPED | OUTPATIENT
Start: 2023-11-05 | End: 2024-02-03

## 2023-11-05 RX ORDER — OMEPRAZOLE 40 MG/1
40 CAPSULE, DELAYED RELEASE ORAL DAILY
Qty: 90 CAPSULE | Refills: 3 | Status: SHIPPED | OUTPATIENT
Start: 2023-11-05 | End: 2024-02-03

## 2023-11-07 ENCOUNTER — TELEPHONE (OUTPATIENT)
Dept: FAMILY MEDICINE CLINIC | Facility: CLINIC | Age: 73
End: 2023-11-07
Payer: MEDICARE

## 2023-11-07 NOTE — TELEPHONE ENCOUNTER
Please let her know that neuropathy has been on her chart since 11/14/2016. If she feels like this has resolved, she can discuss that with Dr. Ng at her next appointment.

## 2023-11-07 NOTE — TELEPHONE ENCOUNTER
Caller: Janna Flowers    Relationship: Self    Best call back number: [phone]     What is the best time to reach you: ANY    Who are you requesting to speak with (clinical staff, provider,  specific staff member): CLINICAL    Do you know the name of the person who called: JANNA    What was the call regarding:   PATIENT STATES THAT NEUROPATHY IS LISTED ON HER CHART. SHE WOULD LIKE TO KNOW IF DR WOLFE HAD DIAGNOSIS FOR HER WITH THIS. PLEASE CALL TO DISCUSS ISSUE

## 2023-11-10 ENCOUNTER — LAB (OUTPATIENT)
Dept: FAMILY MEDICINE CLINIC | Facility: CLINIC | Age: 73
End: 2023-11-10
Payer: MEDICARE

## 2023-11-10 LAB
CHOLEST SERPL-MCNC: 164 MG/DL (ref 0–200)
HBA1C MFR BLD: 7.8 % (ref 4.8–5.6)
HDLC SERPL-MCNC: 56 MG/DL (ref 40–60)
LDLC SERPL CALC-MCNC: 85 MG/DL (ref 0–100)
LDLC/HDLC SERPL: 1.46 {RATIO}
TRIGL SERPL-MCNC: 130 MG/DL (ref 0–150)
VLDLC SERPL-MCNC: 23 MG/DL (ref 5–40)

## 2023-11-10 PROCEDURE — 83036 HEMOGLOBIN GLYCOSYLATED A1C: CPT | Performed by: FAMILY MEDICINE

## 2023-11-10 PROCEDURE — 80061 LIPID PANEL: CPT | Performed by: FAMILY MEDICINE

## 2023-11-12 RX ORDER — INSULIN DEGLUDEC INJECTION 100 U/ML
25 INJECTION, SOLUTION SUBCUTANEOUS DAILY
Qty: 15 ML | Refills: 3 | Status: SHIPPED | OUTPATIENT
Start: 2023-11-12

## 2023-12-13 DIAGNOSIS — E11.9 TYPE 2 DIABETES MELLITUS WITHOUT COMPLICATION, WITHOUT LONG-TERM CURRENT USE OF INSULIN: ICD-10-CM

## 2023-12-29 ENCOUNTER — OFFICE VISIT (OUTPATIENT)
Dept: FAMILY MEDICINE CLINIC | Facility: CLINIC | Age: 73
End: 2023-12-29
Payer: MEDICARE

## 2023-12-29 VITALS
SYSTOLIC BLOOD PRESSURE: 134 MMHG | WEIGHT: 135.8 LBS | DIASTOLIC BLOOD PRESSURE: 68 MMHG | RESPIRATION RATE: 20 BRPM | HEIGHT: 61 IN | OXYGEN SATURATION: 99 % | BODY MASS INDEX: 25.64 KG/M2 | HEART RATE: 77 BPM

## 2023-12-29 DIAGNOSIS — Z79.4 TYPE 2 DIABETES MELLITUS WITH DIABETIC POLYNEUROPATHY, WITH LONG-TERM CURRENT USE OF INSULIN: ICD-10-CM

## 2023-12-29 DIAGNOSIS — M19.90 OSTEOARTHRITIS, UNSPECIFIED OSTEOARTHRITIS TYPE, UNSPECIFIED SITE: ICD-10-CM

## 2023-12-29 DIAGNOSIS — E11.42 TYPE 2 DIABETES MELLITUS WITH DIABETIC POLYNEUROPATHY, WITH LONG-TERM CURRENT USE OF INSULIN: ICD-10-CM

## 2023-12-29 DIAGNOSIS — Z00.00 MEDICARE ANNUAL WELLNESS VISIT, SUBSEQUENT: Primary | ICD-10-CM

## 2023-12-29 DIAGNOSIS — G63 POLYNEUROPATHY ASSOCIATED WITH UNDERLYING DISEASE: ICD-10-CM

## 2023-12-29 DIAGNOSIS — E55.9 VITAMIN D DEFICIENCY, UNSPECIFIED: ICD-10-CM

## 2023-12-29 DIAGNOSIS — I10 ESSENTIAL HYPERTENSION: ICD-10-CM

## 2023-12-29 DIAGNOSIS — E78.2 MIXED HYPERLIPIDEMIA: ICD-10-CM

## 2023-12-29 NOTE — PROGRESS NOTES
The ABCs of the Annual Wellness Visit  Subsequent Medicare Wellness Visit    Subjective    Janna Flowers is a 73 y.o. female who presents for a Subsequent Medicare Wellness Visit.    The following portions of the patient's history were reviewed and   updated as appropriate: allergies, current medications, past family history, past medical history, past social history, past surgical history, and problem list.    Compared to one year ago, the patient feels her physical   health is the same.    Compared to one year ago, the patient feels her mental   health is the same.    Recent Hospitalizations:  She was not admitted to the hospital during the last year.       Current Medical Providers:  Patient Care Team:  Donnie Ng MD as PCP - General    Outpatient Medications Prior to Visit   Medication Sig Dispense Refill   • Blood Glucose Monitoring Suppl (ACCU-CHEK COMPACT CARE KIT) kit ACCU-CHEK COMPACT PLUS CARE KIT     • cyanocobalamin (VITAMIN B-12) 50 MCG tablet Take 1 tablet by mouth Every Other Day.     • Droplet Pen Needles 31G X 8 MM misc USE ONCE DAILY WITH INSULIN 100 each 1   • Farxiga 10 MG tablet TAKE ONE TABLET BY MOUTH DAILY 90 tablet 2   • glucose blood (Accu-Chek Guide) test strip 1 each by Other route 2 (Two) Times a Day. DX E 11.9 300 each 1   • insulin degludec (Tresiba FlexTouch) 100 UNIT/ML solution pen-injector injection Inject 25 Units under the skin into the appropriate area as directed Daily. 15 mL 3   • lisinopril (PRINIVIL,ZESTRIL) 10 MG tablet Take 1 tablet by mouth Daily for 90 days. 90 tablet 3   • metFORMIN (GLUCOPHAGE) 1000 MG tablet TAKE 1 TABLET BY MOUTH TWICE A  tablet 0   • omeprazole (priLOSEC) 40 MG capsule Take 1 capsule by mouth Daily for 90 days. 90 capsule 3   • pravastatin (PRAVACHOL) 20 MG tablet Take 1 tablet by mouth Daily for 90 days. 90 tablet 3     No facility-administered medications prior to visit.       No opioid medication identified on active  "medication list. I have reviewed chart for other potential  high risk medication/s and harmful drug interactions in the elderly.        Aspirin is not on active medication list.  Aspirin use is not indicated based on review of current medical condition/s. Risk of harm outweighs potential benefits.  .    Patient Active Problem List   Diagnosis   • Diabetes mellitus, type II   • Essential hypertension   • Hyperlipidemia   • Osteoarthritis   • Peripheral neuropathy   • Vitamin B deficiency   • Presbyopia   • Vitreous degeneration   • Combined form of senile cataract   • Medicare annual wellness visit, subsequent   • Osteopenia of multiple sites   • Vertigo   • Disorder of right rotator cuff   • UTI (urinary tract infection)   • Viral syndrome   • Persistent migraine aura without cerebral infarction and with status migrainosus, not intractable     Advance Care Planning   Advance Care Planning     Advance Directive is on file.  ACP discussion was held with the patient during this visit. Patient has an advance directive in EMR which is still valid.      Objective    There were no vitals filed for this visit.  Estimated body mass index is 23.38 kg/m² as calculated from the following:    Height as of 11/2/23: 160 cm (63\").    Weight as of 11/2/23: 59.9 kg (132 lb).    BMI is within normal parameters. No other follow-up for BMI required.      Does the patient have evidence of cognitive impairment? No    Lab Results   Component Value Date    TRIG 130 11/10/2023    HDL 56 11/10/2023    LDL 85 11/10/2023    VLDL 23 11/10/2023    HGBA1C 7.80 (H) 11/10/2023        HEALTH RISK ASSESSMENT    Smoking Status:  Social History     Tobacco Use   Smoking Status Never   Smokeless Tobacco Never     Alcohol Consumption:  Social History     Substance and Sexual Activity   Alcohol Use No     Fall Risk Screen:    STEADI Fall Risk Assessment was completed, and patient is at LOW risk for falls.Assessment completed on:12/29/2023    Depression " Screenin/29/2023     3:21 PM   PHQ-2/PHQ-9 Depression Screening   Little Interest or Pleasure in Doing Things 0-->not at all   Feeling Down, Depressed or Hopeless 0-->not at all   PHQ-9: Brief Depression Severity Measure Score 0       Health Habits and Functional and Cognitive Screenin/29/2023     3:21 PM   Functional & Cognitive Status   Do you have difficulty preparing food and eating? No   Do you have difficulty bathing yourself, getting dressed or grooming yourself? No   Do you have difficulty using the toilet? No   Do you have difficulty moving around from place to place? No   Do you have trouble with steps or getting out of a bed or a chair? No   Current Diet Well Balanced Diet   Dental Exam Up to date   Eye Exam Up to date   Exercise (times per week) 5 times per week   Current Exercises Include Other   Do you need help using the phone?  No   Are you deaf or do you have serious difficulty hearing?  No   Do you need help to go to places out of walking distance? No   Do you need help shopping? No   Do you need help preparing meals?  No   Do you need help with housework?  No   Do you need help with laundry? No   Do you need help taking your medications? No   Do you need help managing money? No   Do you ever drive or ride in a car without wearing a seat belt? No   Have you felt unusual stress, anger or loneliness in the last month? No   Who do you live with? Spouse   If you need help, do you have trouble finding someone available to you? No   Do you have difficulty concentrating, remembering or making decisions? No       Age-appropriate Screening Schedule:  Refer to the list below for future screening recommendations based on patient's age, sex and/or medical conditions. Orders for these recommended tests are listed in the plan section. The patient has been provided with a written plan.    Health Maintenance   Topic Date Due   • DIABETIC FOOT EXAM  2020   • URINE MICROALBUMIN  2023    • ANNUAL WELLNESS VISIT  07/11/2023   • COVID-19 Vaccine (6 - 2023-24 season) 09/01/2023   • HEMOGLOBIN A1C  05/10/2024   • DXA SCAN  07/28/2024   • DIABETIC EYE EXAM  08/15/2024   • LIPID PANEL  11/10/2024   • MAMMOGRAM  04/17/2025   • TDAP/TD VACCINES (2 - Td or Tdap) 10/04/2030   • COLORECTAL CANCER SCREENING  07/25/2032   • HEPATITIS C SCREENING  Completed   • INFLUENZA VACCINE  Completed   • Pneumococcal Vaccine 65+  Completed   • ZOSTER VACCINE  Completed                  CMS Preventative Services Quick Reference  Risk Factors Identified During Encounter  {Medicare Wellness Risk Factors:04655}  The above risks/problems have been discussed with the patient.  Pertinent information has been shared with the patient in the After Visit Summary.  An After Visit Summary and PPPS were made available to the patient.    Follow Up:   Next Medicare Wellness visit to be scheduled in 1 year.   {Wrapup  Review (Popup)  Advance Care Planning  Labs  CC  Problem List  Visit Diagnosis  Medications  Result Review  Imaging  Health Maintenance  Quality  BestPractice  SmartSets  SnapShot  Encounters  Notes  Media  Procedures :23}    Additional E&M Note during same encounter follows:  Patient has multiple medical problems which are significant and separately identifiable that require additional work above and beyond the Medicare Wellness Visit.      Chief Complaint  Medicare Wellness-subsequent    Subjective    {Problem List  Visit Diagnosis   Encounters  Notes  Medications  Labs  Result Review Imaging  Media :23}    HPI  Janna SUMMERS Kristie is also being seen today for ***         Objective   Vital Signs:  There were no vitals taken for this visit.    Physical Exam     {The following data was reviewed by (Optional):32916}  {Ambulatory Labs (Optional):10383}  {Data reviewed (Optional):11827:::1}  Upon arrival to the room the patient underwent the Medicare health risk assessment.  Neither the questions  themselves or the answers that were given prompted any major concern on the part of the patient or by the medical staff that gave the assessment.  As far as the preventative care examinations and the preventative care immunizations that this patient requires they are as listed below.   Screening tests recommended:    Colonoscopy -utd  Mammogram-utd  DEXA-utd  PAP/ Pelvic- utd  Diabetic eye exam- once a year  Diabetic foot exam- Sees Sonia  Dentist- q 6mos  Derm- yearly  Immunization:  Influenza- utd  Prevnar-utd  Pneumovax- utd  Tetanus- utd  Shingles vaccine- utd  Hepatitis - utd  Covid  - utd                       Assessment and Plan {CC Problem List  Visit Diagnosis   ROS  Review (Popup)  Health Maintenance  Quality  BestPractice  Medications  SmartSets  SnapShot Encounters  Media :23}  There are no diagnoses linked to this encounter.       {Time Spent (Optional):30745}  Follow Up {Instructions Charge Capture  Follow-up Communications :23}  No follow-ups on file.  Patient was given instructions and counseling regarding her condition or for health maintenance advice. Please see specific information pulled into the AVS if appropriate.

## 2023-12-29 NOTE — PROGRESS NOTES
The ABCs of the Annual Wellness Visit  Subsequent Medicare Wellness Visit    Subjective    Janna Flowers is a 73 y.o. female who presents for a Subsequent Medicare Wellness Visit.    The following portions of the patient's history were reviewed and   updated as appropriate: allergies, current medications, past family history, past medical history, past social history, past surgical history, and problem list.    Compared to one year ago, the patient feels her physical   health is the same.    Compared to one year ago, the patient feels her mental   health is the same.    Recent Hospitalizations:  She was not admitted to the hospital during the last year.       Current Medical Providers:  Patient Care Team:  Donnie Ng MD as PCP - General    Outpatient Medications Prior to Visit   Medication Sig Dispense Refill    Blood Glucose Monitoring Suppl (ACCU-CHEK COMPACT CARE KIT) kit ACCU-CHEK COMPACT PLUS CARE KIT      cyanocobalamin (VITAMIN B-12) 50 MCG tablet Take 1 tablet by mouth Every Other Day.      Droplet Pen Needles 31G X 8 MM misc USE ONCE DAILY WITH INSULIN 100 each 1    Farxiga 10 MG tablet TAKE ONE TABLET BY MOUTH DAILY 90 tablet 2    glucose blood (Accu-Chek Guide) test strip 1 each by Other route 2 (Two) Times a Day. DX E 11.9 300 each 1    insulin degludec (Tresiba FlexTouch) 100 UNIT/ML solution pen-injector injection Inject 25 Units under the skin into the appropriate area as directed Daily. 15 mL 3    lisinopril (PRINIVIL,ZESTRIL) 10 MG tablet Take 1 tablet by mouth Daily for 90 days. 90 tablet 3    metFORMIN (GLUCOPHAGE) 1000 MG tablet TAKE 1 TABLET BY MOUTH TWICE A  tablet 0    omeprazole (priLOSEC) 40 MG capsule Take 1 capsule by mouth Daily for 90 days. 90 capsule 3    pravastatin (PRAVACHOL) 20 MG tablet Take 1 tablet by mouth Daily for 90 days. 90 tablet 3     No facility-administered medications prior to visit.       No opioid medication identified on active medication list. I  "have reviewed chart for other potential  high risk medication/s and harmful drug interactions in the elderly.        Aspirin is not on active medication list.  Aspirin use is not indicated based on review of current medical condition/s. Risk of harm outweighs potential benefits.  .    Patient Active Problem List   Diagnosis    Type 2 diabetes mellitus with diabetic polyneuropathy, with long-term current use of insulin    Essential hypertension    Hyperlipidemia    Osteoarthritis    Peripheral neuropathy    Vitamin B deficiency    Presbyopia    Vitreous degeneration    Combined form of senile cataract    Medicare annual wellness visit, subsequent    Osteopenia of multiple sites    Vertigo    Disorder of right rotator cuff    UTI (urinary tract infection)    Viral syndrome    Persistent migraine aura without cerebral infarction and with status migrainosus, not intractable    Vitamin D deficiency, unspecified     Advance Care Planning   Advance Care Planning     Advance Directive is on file.  ACP discussion was held with the patient during this visit. Patient has an advance directive in EMR which is still valid.      Objective    Vitals:    12/29/23 1523   BP: 134/68   Pulse: 77   Resp: 20   SpO2: 99%   Weight: 61.6 kg (135 lb 12.8 oz)   Height: 154.9 cm (61\")     Estimated body mass index is 25.66 kg/m² as calculated from the following:    Height as of this encounter: 154.9 cm (61\").    Weight as of this encounter: 61.6 kg (135 lb 12.8 oz).    BMI is within normal parameters. No other follow-up for BMI required.      Does the patient have evidence of cognitive impairment? No    Lab Results   Component Value Date    TRIG 130 11/10/2023    HDL 56 11/10/2023    LDL 85 11/10/2023    VLDL 23 11/10/2023    HGBA1C 7.80 (H) 11/10/2023        HEALTH RISK ASSESSMENT    Smoking Status:  Social History     Tobacco Use   Smoking Status Never   Smokeless Tobacco Never     Alcohol Consumption:  Social History     Substance and " Sexual Activity   Alcohol Use No     Fall Risk Screen:    ELISAADI Fall Risk Assessment was completed, and patient is at LOW risk for falls.Assessment completed on:2023    Depression Screenin/29/2023     3:21 PM   PHQ-2/PHQ-9 Depression Screening   Little Interest or Pleasure in Doing Things 0-->not at all   Feeling Down, Depressed or Hopeless 0-->not at all   PHQ-9: Brief Depression Severity Measure Score 0       Health Habits and Functional and Cognitive Screenin/29/2023     3:21 PM   Functional & Cognitive Status   Do you have difficulty preparing food and eating? No   Do you have difficulty bathing yourself, getting dressed or grooming yourself? No   Do you have difficulty using the toilet? No   Do you have difficulty moving around from place to place? No   Do you have trouble with steps or getting out of a bed or a chair? No   Current Diet Well Balanced Diet   Dental Exam Up to date   Eye Exam Up to date   Exercise (times per week) 5 times per week   Current Exercises Include Other   Do you need help using the phone?  No   Are you deaf or do you have serious difficulty hearing?  No   Do you need help to go to places out of walking distance? No   Do you need help shopping? No   Do you need help preparing meals?  No   Do you need help with housework?  No   Do you need help with laundry? No   Do you need help taking your medications? No   Do you need help managing money? No   Do you ever drive or ride in a car without wearing a seat belt? No   Have you felt unusual stress, anger or loneliness in the last month? No   Who do you live with? Spouse   If you need help, do you have trouble finding someone available to you? No   Do you have difficulty concentrating, remembering or making decisions? No       Age-appropriate Screening Schedule:  Refer to the list below for future screening recommendations based on patient's age, sex and/or medical conditions. Orders for these recommended tests are  listed in the plan section. The patient has been provided with a written plan.    Health Maintenance   Topic Date Due    BMI FOLLOWUP  Never done    DIABETIC FOOT EXAM  11/25/2020    URINE MICROALBUMIN  07/05/2023    ANNUAL WELLNESS VISIT  07/11/2023    COVID-19 Vaccine (6 - 2023-24 season) 09/01/2023    HEMOGLOBIN A1C  05/10/2024    DXA SCAN  07/28/2024    DIABETIC EYE EXAM  08/15/2024    LIPID PANEL  11/10/2024    MAMMOGRAM  04/17/2025    TDAP/TD VACCINES (2 - Td or Tdap) 10/04/2030    COLORECTAL CANCER SCREENING  07/25/2032    HEPATITIS C SCREENING  Completed    INFLUENZA VACCINE  Completed    Pneumococcal Vaccine 65+  Completed    ZOSTER VACCINE  Completed                  CMS Preventative Services Quick Reference  Risk Factors Identified During Encounter    The above risks/problems have been discussed with the patient.  Pertinent information has been shared with the patient in the After Visit Summary.  An After Visit Summary and PPPS were made available to the patient.    Follow Up:   Next Medicare Wellness visit to be scheduled in 1 year.       Additional E&M Note during same encounter follows:  Patient has multiple medical problems which are significant and separately identifiable that require additional work above and beyond the Medicare Wellness Visit.      Chief Complaint  Medicare Wellness-subsequent    Subjective        HPI  Janna Flowers presents today for a Medicare wellness visit.    She is doing well overall. She has not had her blood work done yet. She comes every 3 months due to her diabetes. She has never had blood drawn on her wellness visit. She states that her Tresiba was increased to 25 units versus 20 units. Her Farxiga is going to last until 02/26/2024. She has tried Jardiance in the past, but the price was high. She was taken off glipizide. She is unsure about going back on glipizide. She is not sure how much more insulin she has to take if she does not take Farxiga.     She has been  "experiencing spasms in her abdomen that is intermittent and feels like her stomach twists. She admits that stretching resolves it. She has not had any surgery in this area.  She will be outside by the pool, will sit down and suddenly it twists. She must stand due to the pain. She went to the emergency room, and they did an x-ray of her abdomen. She denies any vomiting or explosive diarrhea.     She would like to get on Weight Watchers.     She denies experiencing any headaches. She had a headache last week and believes it was due to stress.    She is up to date with her colonoscopy and mammogram. She admits that her mother and sister had breast cancer. She is due for a DEXA scan this year. She is no longer doing Pap smears and pelvic exams. She has vision exams once a year and is due in 02/2024. She sees Dr. Parsons for peripheral neuropathy. She has not been diagnosed with neuropathy. She has occasional numbness and tingling in her feet. Since her back surgery, she has experienced tingling on top of her foot. She was told that the nerves are trying to burn. She wears socks to bed and uses ointment for the top of her foot. She sees a dentist every 6 months. She sees a dermatologist yearly. She does not see any other specialists.    She is up to date on her pneumonia vaccines. She has had 5 COVID-19 vaccines.       Objective   Vital Signs:  /68   Pulse 77   Resp 20   Ht 154.9 cm (61\")   Wt 61.6 kg (135 lb 12.8 oz)   SpO2 99%   BMI 25.66 kg/m²     Physical Exam  Constitutional:       Appearance: Normal appearance. She is well-developed and normal weight.   HENT:      Head: Normocephalic and atraumatic.      Right Ear: Tympanic membrane, ear canal and external ear normal.      Left Ear: Tympanic membrane, ear canal and external ear normal.      Nose: Nose normal.      Mouth/Throat:      Mouth: Mucous membranes are moist.      Pharynx: Oropharynx is clear. No oropharyngeal exudate.   Eyes:      Extraocular " Movements: Extraocular movements intact.      Conjunctiva/sclera: Conjunctivae normal.      Pupils: Pupils are equal, round, and reactive to light.   Cardiovascular:      Rate and Rhythm: Normal rate and regular rhythm.      Pulses: Normal pulses.      Heart sounds: Normal heart sounds.   Pulmonary:      Effort: Pulmonary effort is normal.      Breath sounds: Normal breath sounds.   Abdominal:      General: Bowel sounds are normal.      Palpations: Abdomen is soft.   Musculoskeletal:         General: Normal range of motion.      Cervical back: Normal range of motion and neck supple.   Skin:     General: Skin is warm and dry.   Neurological:      General: No focal deficit present.      Mental Status: She is alert and oriented to person, place, and time. Mental status is at baseline.   Psychiatric:         Mood and Affect: Mood normal.         Behavior: Behavior normal.         Thought Content: Thought content normal.         Judgment: Judgment normal.          Assessment and Plan     1 Medicare annual wellness visit subsequent year  Upon arrival to the room the patient underwent the Medicare health risk assessment.  Neither the questions themselves or the answers that were given prompted any major concern on the part of the patient or by the medical staff that gave the assessment.  As far as the preventative care examinations and the preventative care immunizations that this patient requires they are as listed below.   Screening tests recommended:    Colonoscopy -utd  Mammogram-utd  DEXA-utd  PAP/ Pelvic- utd  Diabetic eye exam- once a year  Diabetic foot exam- Sees Sonia  Dentist- q 6mos  Derm- yearly  Immunization:  Influenza- utd  Prevnar-utd  Pneumovax- utd  Tetanus- utd  Shingles vaccine- utd  Hepatitis - utd  Covid  - utd                  2. Type 2 diabetes without complications without use of insulin  - Janna is a 73-year-old with type 2 diabetes. She has had reasonably good control of her sugars over the  years, but there have been some variations. The diagnosis in the chart says without long term use of insulin, but she actually does use insulin on a daily basis. She is on Tresiba and currently 25 units a day. I will have to change that in the chart for her. She is also on Farxiga, but we are not going to be able to stay on that because it is very costly, over 500 dollars a month for her, so she cannot stay on that. She is going to finish the bottle she has and then she is going to stop it. She is going to continue the Metformin though 1000 mg twice a day. After we get her March blood levels back, we will tweak her insulin dose and hopefully eventually see.     3. Mixed hyperlipidemia  - The patient will have lipids rechecked in March. She will continue a low carb diet, continue her insulin to control her sugars and take pravastatin for the lipid elevation.     4. Hypertension  - The patient's blood pressure was 134/68 mmHg today and she is taking lisinopril 10 mg a day. We will continue that since she has such great control.     5. Osteoarthritis  - The patient has osteoarthritis involving the hands, hips, knees, and back. In particular, her low back is bothersome to her. She will continue to use heat, Tylenol, and activity as her main treatment.     6. Polyneuropathy  - She has some early neuropathic changes in the bottom of her feet. She will have intermittent burning and tingling in the bottom of her feet and toes which comes and goes and is not too severe. I told her to track down some [employer] medical food, it has a lot of folic acid in it, and she could try that for her early neuropathy. It is not bothersome enough that we would move on to anything like pregabalin or gabapentin or anything stronger.     7. Vitamin D deficiency  - The patient's vitamin D levels were checked, and we will check it again in March. Last time we checked it, she was in the therapeutic range.           Follow Up   Return in about 4  months (around 4/29/2024), or if symptoms worsen or fail to improve, for Recheck.  Patient was given instructions and counseling regarding her condition or for health maintenance advice. Please see specific information pulled into the AVS if appropriate.       Transcribed from ambient dictation for Donnie Ng MD by Geovanna Perez.  12/29/23   16:23 EST    Patient or patient representative verbalized consent to the visit recording.  I have personally performed the services described in this document as transcribed by the above individual, and it is both accurate and complete.  Donnie Ng MD  1/1/2024  19:00 EST

## 2024-01-04 RX ORDER — PEN NEEDLE, DIABETIC 31 GX5/16"
NEEDLE, DISPOSABLE MISCELLANEOUS
Qty: 100 EACH | Refills: 1 | Status: SHIPPED | OUTPATIENT
Start: 2024-01-04

## 2024-02-20 ENCOUNTER — TELEPHONE (OUTPATIENT)
Dept: FAMILY MEDICINE CLINIC | Facility: CLINIC | Age: 74
End: 2024-02-20
Payer: MEDICARE

## 2024-02-20 DIAGNOSIS — Z12.31 ENCOUNTER FOR SCREENING MAMMOGRAM FOR MALIGNANT NEOPLASM OF BREAST: Primary | ICD-10-CM

## 2024-02-20 NOTE — TELEPHONE ENCOUNTER
Patient is requesting an order for her routine mammogram screening.   Patient denies any issues / concerns  Patient would like order sent to [facility].    Thank you!

## 2024-02-25 DIAGNOSIS — E11.9 TYPE 2 DIABETES MELLITUS WITHOUT COMPLICATION, WITHOUT LONG-TERM CURRENT USE OF INSULIN: ICD-10-CM

## 2024-03-13 ENCOUNTER — LAB (OUTPATIENT)
Dept: FAMILY MEDICINE CLINIC | Facility: CLINIC | Age: 74
End: 2024-03-13
Payer: MEDICARE

## 2024-03-13 DIAGNOSIS — G63 POLYNEUROPATHY ASSOCIATED WITH UNDERLYING DISEASE: ICD-10-CM

## 2024-03-13 DIAGNOSIS — E55.9 VITAMIN D DEFICIENCY, UNSPECIFIED: ICD-10-CM

## 2024-03-13 DIAGNOSIS — I10 ESSENTIAL HYPERTENSION: ICD-10-CM

## 2024-03-13 DIAGNOSIS — E78.2 MIXED HYPERLIPIDEMIA: ICD-10-CM

## 2024-03-13 DIAGNOSIS — M19.90 OSTEOARTHRITIS, UNSPECIFIED OSTEOARTHRITIS TYPE, UNSPECIFIED SITE: ICD-10-CM

## 2024-03-13 LAB
25(OH)D3 SERPL-MCNC: 37.4 NG/ML (ref 30–100)
ALBUMIN SERPL-MCNC: 4.1 G/DL (ref 3.5–5.2)
ALBUMIN/GLOB SERPL: 1.6 G/DL
ALP SERPL-CCNC: 50 U/L (ref 39–117)
ALT SERPL W P-5'-P-CCNC: 14 U/L (ref 1–33)
ANION GAP SERPL CALCULATED.3IONS-SCNC: 12.9 MMOL/L (ref 5–15)
AST SERPL-CCNC: 19 U/L (ref 1–32)
BACTERIA UR QL AUTO: NORMAL /HPF
BASOPHILS # BLD AUTO: 0.05 10*3/MM3 (ref 0–0.2)
BASOPHILS NFR BLD AUTO: 0.6 % (ref 0–1.5)
BILIRUB SERPL-MCNC: 0.3 MG/DL (ref 0–1.2)
BILIRUB UR QL STRIP: NEGATIVE
BUN SERPL-MCNC: 18 MG/DL (ref 8–23)
BUN/CREAT SERPL: 28.1 (ref 7–25)
CALCIUM SPEC-SCNC: 9.2 MG/DL (ref 8.6–10.5)
CHLORIDE SERPL-SCNC: 100 MMOL/L (ref 98–107)
CHOLEST SERPL-MCNC: 156 MG/DL (ref 0–200)
CLARITY UR: ABNORMAL
CO2 SERPL-SCNC: 26.1 MMOL/L (ref 22–29)
COLOR UR: YELLOW
CREAT SERPL-MCNC: 0.64 MG/DL (ref 0.57–1)
DEPRECATED RDW RBC AUTO: 41.3 FL (ref 37–54)
EGFRCR SERPLBLD CKD-EPI 2021: 92.9 ML/MIN/1.73
EOSINOPHIL # BLD AUTO: 0.18 10*3/MM3 (ref 0–0.4)
EOSINOPHIL NFR BLD AUTO: 2.3 % (ref 0.3–6.2)
ERYTHROCYTE [DISTWIDTH] IN BLOOD BY AUTOMATED COUNT: 13.9 % (ref 12.3–15.4)
GLOBULIN UR ELPH-MCNC: 2.6 GM/DL
GLUCOSE SERPL-MCNC: 96 MG/DL (ref 65–99)
GLUCOSE UR STRIP-MCNC: NEGATIVE MG/DL
HBA1C MFR BLD: 7.8 % (ref 4.8–5.6)
HCT VFR BLD AUTO: 37.2 % (ref 34–46.6)
HDLC SERPL-MCNC: 64 MG/DL (ref 40–60)
HGB BLD-MCNC: 11.5 G/DL (ref 12–15.9)
HGB UR QL STRIP.AUTO: NEGATIVE
HOLD SPECIMEN: NORMAL
HYALINE CASTS UR QL AUTO: NORMAL /LPF
IMM GRANULOCYTES # BLD AUTO: 0.02 10*3/MM3 (ref 0–0.05)
IMM GRANULOCYTES NFR BLD AUTO: 0.3 % (ref 0–0.5)
KETONES UR QL STRIP: NEGATIVE
LDLC SERPL CALC-MCNC: 71 MG/DL (ref 0–100)
LDLC/HDLC SERPL: 1.06 {RATIO}
LEUKOCYTE ESTERASE UR QL STRIP.AUTO: ABNORMAL
LYMPHOCYTES # BLD AUTO: 2.77 10*3/MM3 (ref 0.7–3.1)
LYMPHOCYTES NFR BLD AUTO: 35.1 % (ref 19.6–45.3)
MCH RBC QN AUTO: 25.6 PG (ref 26.6–33)
MCHC RBC AUTO-ENTMCNC: 30.9 G/DL (ref 31.5–35.7)
MCV RBC AUTO: 82.7 FL (ref 79–97)
MONOCYTES # BLD AUTO: 0.84 10*3/MM3 (ref 0.1–0.9)
MONOCYTES NFR BLD AUTO: 10.6 % (ref 5–12)
NEUTROPHILS NFR BLD AUTO: 4.04 10*3/MM3 (ref 1.7–7)
NEUTROPHILS NFR BLD AUTO: 51.1 % (ref 42.7–76)
NITRITE UR QL STRIP: NEGATIVE
NRBC BLD AUTO-RTO: 0 /100 WBC (ref 0–0.2)
PH UR STRIP.AUTO: 7.5 [PH] (ref 5–8)
PLATELET # BLD AUTO: 330 10*3/MM3 (ref 140–450)
PMV BLD AUTO: 11.3 FL (ref 6–12)
POTASSIUM SERPL-SCNC: 4.3 MMOL/L (ref 3.5–5.2)
PROT SERPL-MCNC: 6.7 G/DL (ref 6–8.5)
PROT UR QL STRIP: NEGATIVE
RBC # BLD AUTO: 4.5 10*6/MM3 (ref 3.77–5.28)
RBC # UR STRIP: NORMAL /HPF
REF LAB TEST METHOD: NORMAL
SODIUM SERPL-SCNC: 139 MMOL/L (ref 136–145)
SP GR UR STRIP: 1.01 (ref 1–1.03)
SQUAMOUS #/AREA URNS HPF: NORMAL /HPF
T4 FREE SERPL-MCNC: 1.45 NG/DL (ref 0.93–1.7)
TRIGL SERPL-MCNC: 120 MG/DL (ref 0–150)
TSH SERPL DL<=0.05 MIU/L-ACNC: 2.21 UIU/ML (ref 0.27–4.2)
UROBILINOGEN UR QL STRIP: ABNORMAL
VIT B12 BLD-MCNC: 1373 PG/ML (ref 211–946)
VLDLC SERPL-MCNC: 21 MG/DL (ref 5–40)
WBC # UR STRIP: NORMAL /HPF
WBC NRBC COR # BLD AUTO: 7.9 10*3/MM3 (ref 3.4–10.8)

## 2024-03-13 PROCEDURE — 82306 VITAMIN D 25 HYDROXY: CPT | Performed by: FAMILY MEDICINE

## 2024-03-13 PROCEDURE — 80053 COMPREHEN METABOLIC PANEL: CPT | Performed by: FAMILY MEDICINE

## 2024-03-13 PROCEDURE — 84443 ASSAY THYROID STIM HORMONE: CPT | Performed by: FAMILY MEDICINE

## 2024-03-13 PROCEDURE — 83036 HEMOGLOBIN GLYCOSYLATED A1C: CPT | Performed by: FAMILY MEDICINE

## 2024-03-13 PROCEDURE — 81001 URINALYSIS AUTO W/SCOPE: CPT | Performed by: FAMILY MEDICINE

## 2024-03-13 PROCEDURE — 84439 ASSAY OF FREE THYROXINE: CPT | Performed by: FAMILY MEDICINE

## 2024-03-13 PROCEDURE — 82607 VITAMIN B-12: CPT | Performed by: FAMILY MEDICINE

## 2024-03-13 PROCEDURE — 85025 COMPLETE CBC W/AUTO DIFF WBC: CPT | Performed by: FAMILY MEDICINE

## 2024-03-13 PROCEDURE — 80061 LIPID PANEL: CPT | Performed by: FAMILY MEDICINE

## 2024-03-13 PROCEDURE — 36415 COLL VENOUS BLD VENIPUNCTURE: CPT

## 2024-03-14 ENCOUNTER — TELEPHONE (OUTPATIENT)
Dept: FAMILY MEDICINE CLINIC | Facility: CLINIC | Age: 74
End: 2024-03-14
Payer: MEDICARE

## 2024-03-14 NOTE — TELEPHONE ENCOUNTER
Gave message to patient at 2:11pm.  She voiced understanding.  She is just anxious about the results.

## 2024-03-14 NOTE — TELEPHONE ENCOUNTER
Can you please let Janna know that Dr. Ng has not reviewed her labs drawn yesterday yet. Once he does he will send me a note and I'll call her with those.

## 2024-03-14 NOTE — TELEPHONE ENCOUNTER
Caller: Janna Flowers    Relationship: Self    Best call back number: [phone]     Caller requesting test results: PATIENT    What test was performed: BLOOD WORK     When was the test performed: 3/13/24    Where was the test performed: IN OFFICE     Additional notes:     PATIENT REALLY WANTS TO KNOW WHAT HER A1C WAS    PLEASE ADVISE

## 2024-03-17 RX ORDER — INSULIN DEGLUDEC INJECTION 100 U/ML
30 INJECTION, SOLUTION SUBCUTANEOUS DAILY
Start: 2024-03-17

## 2024-03-17 NOTE — PROGRESS NOTES
Ask Janna to increase her insulin to 30 units a day.  Follow a low-carb diet exercise a little more and lets see if we can get her A1c to drop.  All other medicine should stay the same

## 2024-03-18 ENCOUNTER — TELEPHONE (OUTPATIENT)
Dept: FAMILY MEDICINE CLINIC | Facility: CLINIC | Age: 74
End: 2024-03-18
Payer: MEDICARE

## 2024-03-18 DIAGNOSIS — E11.42 TYPE 2 DIABETES MELLITUS WITH DIABETIC POLYNEUROPATHY, WITH LONG-TERM CURRENT USE OF INSULIN: Primary | ICD-10-CM

## 2024-03-18 DIAGNOSIS — Z79.4 TYPE 2 DIABETES MELLITUS WITH DIABETIC POLYNEUROPATHY, WITH LONG-TERM CURRENT USE OF INSULIN: Primary | ICD-10-CM

## 2024-03-18 NOTE — TELEPHONE ENCOUNTER
Caller: Janna Flowers    Relationship: Self    Best call back number: [phone]    What orders are you requesting (i.e. lab or imaging): LABS    In what timeframe would the patient need to come in:     Where will you receive your lab/imaging services: IN OFFICE    Additional notes: PLEASE CALL TO SCHEDULE

## 2024-03-18 NOTE — TELEPHONE ENCOUNTER
Caller: Janna Flowers    Relationship: Self    Best call back number: [phone]     What is the best time to reach you: ANY    Who are you requesting to speak with (clinical staff, provider,  specific staff member): DR WOLFE       What was the call regarding: PATIENT WOULD LIKE TO KNOW HOW MUCH INSULIN UNITS THAT SHE SHOULD BE TAKING     Is it okay if the provider responds through MyChart: YES

## 2024-03-18 NOTE — TELEPHONE ENCOUNTER
I spoke with soco and gave her the result note from 03/17/24. Insulin increased to 30 units. Patient verbalized understanding.

## 2024-03-26 RX ORDER — INSULIN DEGLUDEC 100 U/ML
30 INJECTION, SOLUTION SUBCUTANEOUS DAILY
Start: 2024-03-26

## 2024-03-26 NOTE — TELEPHONE ENCOUNTER
Incoming Refill Request      Medication requested (name and dose):   insulin degludec (Tresiba FlexTouch) 100 UNIT/ML solution pen-injector injection  30 Units, Daily       Pharmacy where request should be sent: [id number] - [place], [place] - [address] - [phone]  - [phone]     Additional details provided by patient: NONE    Best call back number: [phone]    Does the patient have less than a 3 day supply:  [x] Yes  [] No    Edgar Conrad Rep  03/26/24, 11:22 EDT

## 2024-04-09 ENCOUNTER — TELEPHONE (OUTPATIENT)
Dept: FAMILY MEDICINE CLINIC | Facility: CLINIC | Age: 74
End: 2024-04-09
Payer: MEDICARE

## 2024-04-09 RX ORDER — INSULIN DEGLUDEC 100 U/ML
30 INJECTION, SOLUTION SUBCUTANEOUS DAILY
Qty: 9 ML | Refills: 3 | Status: SHIPPED | OUTPATIENT
Start: 2024-04-09

## 2024-04-09 NOTE — TELEPHONE ENCOUNTER
Caller: Janna Flowers    Relationship: Self    Best call back number:   Janna Flowers (Self) [phone] (Home)       What was the call regarding: [facility] HAS NOT RECEIVED THE RX FOR TRESIBA YET     CAN YOU RESEND TO [facility]     Is it okay if the provider responds through MyChart: CALL IF NEEDED

## 2024-04-22 DIAGNOSIS — Z12.31 ENCOUNTER FOR SCREENING MAMMOGRAM FOR MALIGNANT NEOPLASM OF BREAST: ICD-10-CM

## 2024-05-24 DIAGNOSIS — E11.9 TYPE 2 DIABETES MELLITUS WITHOUT COMPLICATION, WITHOUT LONG-TERM CURRENT USE OF INSULIN: ICD-10-CM

## 2024-05-26 DIAGNOSIS — E11.9 TYPE 2 DIABETES MELLITUS WITHOUT COMPLICATION, WITHOUT LONG-TERM CURRENT USE OF INSULIN: ICD-10-CM

## 2024-06-24 ENCOUNTER — LAB (OUTPATIENT)
Dept: FAMILY MEDICINE CLINIC | Facility: CLINIC | Age: 74
End: 2024-06-24
Payer: MEDICARE

## 2024-06-24 DIAGNOSIS — Z79.4 TYPE 2 DIABETES MELLITUS WITH DIABETIC POLYNEUROPATHY, WITH LONG-TERM CURRENT USE OF INSULIN: ICD-10-CM

## 2024-06-24 DIAGNOSIS — E11.42 TYPE 2 DIABETES MELLITUS WITH DIABETIC POLYNEUROPATHY, WITH LONG-TERM CURRENT USE OF INSULIN: ICD-10-CM

## 2024-06-24 LAB
ALBUMIN UR-MCNC: <1.2 MG/DL
HBA1C MFR BLD: 8.2 % (ref 4.8–5.6)

## 2024-06-24 PROCEDURE — 82043 UR ALBUMIN QUANTITATIVE: CPT | Performed by: FAMILY MEDICINE

## 2024-06-24 PROCEDURE — 36415 COLL VENOUS BLD VENIPUNCTURE: CPT

## 2024-06-24 PROCEDURE — 83036 HEMOGLOBIN GLYCOSYLATED A1C: CPT | Performed by: FAMILY MEDICINE

## 2024-07-01 ENCOUNTER — OFFICE VISIT (OUTPATIENT)
Dept: FAMILY MEDICINE CLINIC | Facility: CLINIC | Age: 74
End: 2024-07-01
Payer: MEDICARE

## 2024-07-01 VITALS
HEART RATE: 84 BPM | SYSTOLIC BLOOD PRESSURE: 128 MMHG | HEIGHT: 61 IN | DIASTOLIC BLOOD PRESSURE: 70 MMHG | OXYGEN SATURATION: 97 % | WEIGHT: 137 LBS | BODY MASS INDEX: 25.86 KG/M2 | RESPIRATION RATE: 16 BRPM

## 2024-07-01 DIAGNOSIS — Z79.4 TYPE 2 DIABETES MELLITUS WITH DIABETIC POLYNEUROPATHY, WITH LONG-TERM CURRENT USE OF INSULIN: Primary | ICD-10-CM

## 2024-07-01 DIAGNOSIS — E16.2 HYPOGLYCEMIA: ICD-10-CM

## 2024-07-01 DIAGNOSIS — E11.42 TYPE 2 DIABETES MELLITUS WITH DIABETIC POLYNEUROPATHY, WITH LONG-TERM CURRENT USE OF INSULIN: Primary | ICD-10-CM

## 2024-07-01 PROCEDURE — G2211 COMPLEX E/M VISIT ADD ON: HCPCS | Performed by: FAMILY MEDICINE

## 2024-07-01 PROCEDURE — 1159F MED LIST DOCD IN RCRD: CPT | Performed by: FAMILY MEDICINE

## 2024-07-01 PROCEDURE — 3052F HG A1C>EQUAL 8.0%<EQUAL 9.0%: CPT | Performed by: FAMILY MEDICINE

## 2024-07-01 PROCEDURE — 1160F RVW MEDS BY RX/DR IN RCRD: CPT | Performed by: FAMILY MEDICINE

## 2024-07-01 PROCEDURE — 3074F SYST BP LT 130 MM HG: CPT | Performed by: FAMILY MEDICINE

## 2024-07-01 PROCEDURE — 3078F DIAST BP <80 MM HG: CPT | Performed by: FAMILY MEDICINE

## 2024-07-01 PROCEDURE — 1126F AMNT PAIN NOTED NONE PRSNT: CPT | Performed by: FAMILY MEDICINE

## 2024-07-01 PROCEDURE — 99214 OFFICE O/P EST MOD 30 MIN: CPT | Performed by: FAMILY MEDICINE

## 2024-07-01 RX ORDER — ACYCLOVIR 400 MG/1
1 TABLET ORAL WEEKLY
Qty: 1 EACH | Refills: 0 | Status: SHIPPED | OUTPATIENT
Start: 2024-07-01 | End: 2024-07-01

## 2024-07-01 RX ORDER — ACYCLOVIR 400 MG/1
1 TABLET ORAL
Qty: 9 EACH | Refills: 3 | Status: SHIPPED | OUTPATIENT
Start: 2024-07-01 | End: 2024-07-01

## 2024-07-01 NOTE — PROGRESS NOTES
"Chief Complaint  Diabetes, Hypertension, and Hyperlipidemia    Subjective        Janna Flowers presents to [facility]  History of Present Illness  The patient is a 74-year-old white female who presents for evaluation of multiple medical concerns.    The patient discontinued Farxiga due to cost concerns, and her Tresiba dosage was recently increased to 30. Her morning blood glucose levels typically range from 60 to 90. Over the past two days, she has consumed Glucerna during lunch, which seems to lower her blood glucose levels. However, if she consumes a sandwich or chips, her blood glucose levels increase. On 04/30/2023, her blood glucose level was as low as 37, causing her to wake up at 2:00 AM with profuse sweating. She consumed peanut butter and monitored her blood glucose level again at 20:00 AM, which was 202, 37, 223, and 103. The following morning, her blood glucose level increased to 127. She did not take her Tresiba until later that evening, and her blood glucose level increased to 226. Her blood glucose levels occasionally increase to 175 when she eats. This morning, her blood glucose level was 75. She takes her Tresiba every morning after her breakfast. She checks her blood glucose levels at 6:45 AM. She takes metformin in the morning and after her evening meal. She rows for 30 minutes daily in the morning.    Last year, she was admitted to the ER with dizziness and vomiting. She suspects her symptoms are due to allergies. She discontinued Claritin-D due to dizziness. Her left ear is clogged, and she sleeps propped up. She does not sleep in a supine position due to dizziness when turning over on her left side. She has used Flonase in the past.       Objective   Vital Signs:  /70   Pulse 84   Resp 16   Ht 154.9 cm (61\")   Wt 62.1 kg (137 lb)   SpO2 97%   BMI 25.89 kg/m²   Estimated body mass index is 25.89 kg/m² as calculated from the following:    Height " "as of this encounter: 154.9 cm (61\").    Weight as of this encounter: 62.1 kg (137 lb).             Physical Exam  Constitutional:       Appearance: Normal appearance. She is well-developed and normal weight.   HENT:      Head: Normocephalic and atraumatic.      Right Ear: Tympanic membrane, ear canal and external ear normal.      Left Ear: Tympanic membrane, ear canal and external ear normal.      Nose: Nose normal.      Mouth/Throat:      Mouth: Mucous membranes are moist.      Pharynx: Oropharynx is clear. No oropharyngeal exudate.   Eyes:      Extraocular Movements: Extraocular movements intact.      Conjunctiva/sclera: Conjunctivae normal.      Pupils: Pupils are equal, round, and reactive to light.   Cardiovascular:      Rate and Rhythm: Normal rate and regular rhythm.      Pulses: Normal pulses.      Heart sounds: Normal heart sounds.   Pulmonary:      Effort: Pulmonary effort is normal.      Breath sounds: Normal breath sounds.   Abdominal:      General: Bowel sounds are normal.      Palpations: Abdomen is soft.   Musculoskeletal:         General: Normal range of motion.      Cervical back: Normal range of motion and neck supple.   Skin:     General: Skin is warm and dry.   Neurological:      General: No focal deficit present.      Mental Status: She is alert and oriented to person, place, and time. Mental status is at baseline.   Psychiatric:         Mood and Affect: Mood normal.         Behavior: Behavior normal.         Thought Content: Thought content normal.         Judgment: Judgment normal.        Result Review :          Results  Laboratory Studies  A1c is 8.2.                Assessment & Plan  The patient is a very pleasant 74-year-old white female who has insulin-dependent diabetes.    1. Type 2 diabetes with polyneuropathy and use of insulin.  The patient's blood sugar levels, including hypoglycemia both during the night and in the morning, are consistently fluctuating. She is currently on Tresiba " 30 units daily, administered in the morning. Her morning blood sugar levels are typically normal or low, with high readings later in the evening. Despite careful eating, her blood sugar levels remain labile, with occasional high readings. Her A1c levels have escalated from 7.8 approximately 3 months ago to 8.2 approximately a week ago. The cause of this current episode remains uncertain, as it is labile, and she is experiencing early morning hypoglycemic episodes, which are somewhat concerning. A continuous glucose monitor is recommended to prevent frequent hypoglycemia. Once better control is established, the needs and when necessary will be evaluated. The Tresiba dosage will be reduced from morning to evening. The continuous glucose monitor will hopefully aid in managing her care.    2. Hypoglycemia.  The patient has experienced severe hypoglycemic episodes, where her sugar levels dropped to the 37 to 40 range, accompanied by sweating and shakiness. She managed these episodes by eating something after sleep. However, this episode was particularly bothersome to her. The glucose monitor will be continued, to assess the severity and frequency of her low blood sugar levels. An alarm will be set up with the monitor, which is expected to provide a safety feature.    Follow-up  A follow-up appointment is scheduled for 4 months from now.            Follow Up     Return in about 3 months (around 10/1/2024), or if symptoms worsen or fail to improve, for Recheck.  Patient was given instructions and counseling regarding her condition or for health maintenance advice. Please see specific information pulled into the AVS if appropriate.     Patient or patient representative verbalized consent for the use of Ambient Listening during the visit with  Donnie Ng MD for chart documentation. 7/1/2024  11:55 EDT

## 2024-07-03 DIAGNOSIS — Z79.4 TYPE 2 DIABETES MELLITUS WITH DIABETIC POLYNEUROPATHY, WITH LONG-TERM CURRENT USE OF INSULIN: Primary | ICD-10-CM

## 2024-07-03 DIAGNOSIS — E11.42 TYPE 2 DIABETES MELLITUS WITH DIABETIC POLYNEUROPATHY, WITH LONG-TERM CURRENT USE OF INSULIN: Primary | ICD-10-CM

## 2024-07-03 RX ORDER — BLOOD SUGAR DIAGNOSTIC
STRIP MISCELLANEOUS
Qty: 100 EACH | Refills: 1 | Status: SHIPPED | OUTPATIENT
Start: 2024-07-03 | End: 2024-07-03 | Stop reason: SDUPTHER

## 2024-07-03 RX ORDER — BLOOD SUGAR DIAGNOSTIC
STRIP MISCELLANEOUS
Qty: 100 EACH | Refills: 1 | Status: SHIPPED | OUTPATIENT
Start: 2024-07-03 | End: 2024-07-08 | Stop reason: SDUPTHER

## 2024-07-08 DIAGNOSIS — E11.42 TYPE 2 DIABETES MELLITUS WITH DIABETIC POLYNEUROPATHY, WITH LONG-TERM CURRENT USE OF INSULIN: ICD-10-CM

## 2024-07-08 DIAGNOSIS — Z79.4 TYPE 2 DIABETES MELLITUS WITH DIABETIC POLYNEUROPATHY, WITH LONG-TERM CURRENT USE OF INSULIN: ICD-10-CM

## 2024-07-08 DIAGNOSIS — Z79.4 TYPE 2 DIABETES MELLITUS WITH DIABETIC POLYNEUROPATHY, WITH LONG-TERM CURRENT USE OF INSULIN: Primary | ICD-10-CM

## 2024-07-08 DIAGNOSIS — E11.9 TYPE 2 DIABETES MELLITUS WITHOUT COMPLICATION, WITHOUT LONG-TERM CURRENT USE OF INSULIN: ICD-10-CM

## 2024-07-08 DIAGNOSIS — E11.42 TYPE 2 DIABETES MELLITUS WITH DIABETIC POLYNEUROPATHY, WITH LONG-TERM CURRENT USE OF INSULIN: Primary | ICD-10-CM

## 2024-07-08 RX ORDER — BLOOD SUGAR DIAGNOSTIC
STRIP MISCELLANEOUS
Qty: 100 EACH | Refills: 3 | Status: SHIPPED | OUTPATIENT
Start: 2024-07-08

## 2024-07-08 RX ORDER — BLOOD SUGAR DIAGNOSTIC
STRIP MISCELLANEOUS
Qty: 100 EACH | Refills: 1 | Status: SHIPPED | OUTPATIENT
Start: 2024-07-08 | End: 2024-07-08 | Stop reason: SDUPTHER

## 2024-07-08 RX ORDER — INSULIN DEGLUDEC 100 U/ML
30 INJECTION, SOLUTION SUBCUTANEOUS DAILY
Qty: 9 ML | Refills: 3 | Status: SHIPPED | OUTPATIENT
Start: 2024-07-08

## 2024-07-15 ENCOUNTER — TELEPHONE (OUTPATIENT)
Dept: FAMILY MEDICINE CLINIC | Facility: CLINIC | Age: 74
End: 2024-07-15

## 2024-07-15 NOTE — TELEPHONE ENCOUNTER
Chronic anemia:  - Hemoglobin stable  - rpt labwork in AM    Results from last 7 days   Lab Units 11/19/23  0556 11/19/23  0310 11/18/23  1617   HEMOGLOBIN g/dL 9.6* 9.7* 9.9*   HEMATOCRIT % 30.6* 31.0* 31.4*   MCV fL 94 95 94 Cookie this message makes no sense.  First of all it does not tell me how many units of Tresiba insulin she is taking daily?  Second of all the dates are way off.  It is 7/15 today but she is giving me readings of 7/21 and 7/22 and 7/23??  Ask her to clarify how much insulin she is taking and when she is taking it.  When she is getting these low readings does she feel the effects?  If she is sweaty shaking and craving food?  Is she confused?

## 2024-07-15 NOTE — TELEPHONE ENCOUNTER
Caller: aJnna Flowers    Relationship: Self    Best call back number: [phone]     What was the call regarding: PATIENT HAS BEEN TAKING  insulin degludec (Tresiba FlexTouch) 100 UNIT/ML solution pen-injector injection   TO IMPROVE HER BLOOD SUGAR READINGS.    7/21 READ 44 AT NIGHT  7/22 READ 58 IN THE MORNING  7/23 TODAY     HIGHEST IN THE MORNING HAS READ 77    ASKING TO BE ADVISED

## 2024-07-24 ENCOUNTER — TELEPHONE (OUTPATIENT)
Dept: FAMILY MEDICINE CLINIC | Facility: CLINIC | Age: 74
End: 2024-07-24
Payer: MEDICARE

## 2024-07-24 DIAGNOSIS — E11.42 TYPE 2 DIABETES MELLITUS WITH DIABETIC POLYNEUROPATHY, WITH LONG-TERM CURRENT USE OF INSULIN: ICD-10-CM

## 2024-07-24 DIAGNOSIS — Z79.4 TYPE 2 DIABETES MELLITUS WITH DIABETIC POLYNEUROPATHY, WITH LONG-TERM CURRENT USE OF INSULIN: ICD-10-CM

## 2024-07-24 NOTE — TELEPHONE ENCOUNTER
Caller: Janna Flowers    Relationship: Self    Best call back number: [phone]     What is the best time to reach you: ANYTIME     Who are you requesting to speak with (clinical staff, provider,  specific staff member): CLINICAL       What was the call regarding: PATIENT STATED THAT SHE PICKED UP HER TEST STRIPS AND HER PRESCRIPTION IS  STRIPS DUE TO HER CHECKING LEVELS TWICE A DAY. BUT PHARMACY ONLY REFILLED FOR 50 STRIPS ONCE A DAY. PATIENT NEEDS A NEW PRESCRIPTION WRITTEN FOR TWICE DAY ON NEXT REFILL  TEST STRIPS.     Is it okay if the provider responds through MyChart: YES, PREFERRED CALLBACK

## 2024-07-25 RX ORDER — BLOOD SUGAR DIAGNOSTIC
STRIP MISCELLANEOUS
Qty: 100 EACH | Refills: 3 | Status: SHIPPED | OUTPATIENT
Start: 2024-07-25

## 2024-07-25 NOTE — TELEPHONE ENCOUNTER
Please let patient know that updated RX has been sent to [facility]. Thank you.

## 2024-08-06 RX ORDER — PEN NEEDLE, DIABETIC 31 GX5/16"
NEEDLE, DISPOSABLE MISCELLANEOUS
Qty: 100 EACH | Refills: 1 | Status: SHIPPED | OUTPATIENT
Start: 2024-08-06 | End: 2024-08-08 | Stop reason: CLARIF

## 2024-08-07 ENCOUNTER — TELEPHONE (OUTPATIENT)
Dept: FAMILY MEDICINE CLINIC | Facility: CLINIC | Age: 74
End: 2024-08-07
Payer: MEDICARE

## 2024-08-07 DIAGNOSIS — Z79.4 TYPE 2 DIABETES MELLITUS WITH DIABETIC POLYNEUROPATHY, WITH LONG-TERM CURRENT USE OF INSULIN: ICD-10-CM

## 2024-08-07 DIAGNOSIS — E11.42 TYPE 2 DIABETES MELLITUS WITH DIABETIC POLYNEUROPATHY, WITH LONG-TERM CURRENT USE OF INSULIN: ICD-10-CM

## 2024-08-07 NOTE — TELEPHONE ENCOUNTER
PT IS HAVING DIFFICULTY OBTAINING DIABETIC SUPPLIES. [facility] HAS NOT HAD TACOS SENSOR AND TELLS HER THEY HAVE NOT ACCEPTED MEDICARE AGREEMENT TO COVER NEEDLES. PT HAS MED A, B, & D

## 2024-08-08 NOTE — TELEPHONE ENCOUNTER
Sent to pharmacy    She was using [employer] on pen needles and medicare will not cover store brand unless there is a contract.  The needles I picked is preferred per epic.

## 2024-08-09 ENCOUNTER — TELEPHONE (OUTPATIENT)
Dept: FAMILY MEDICINE CLINIC | Facility: CLINIC | Age: 74
End: 2024-08-09
Payer: MEDICARE

## 2024-08-09 DIAGNOSIS — E11.42 TYPE 2 DIABETES MELLITUS WITH DIABETIC POLYNEUROPATHY, WITH LONG-TERM CURRENT USE OF INSULIN: ICD-10-CM

## 2024-08-09 DIAGNOSIS — Z79.4 TYPE 2 DIABETES MELLITUS WITH DIABETIC POLYNEUROPATHY, WITH LONG-TERM CURRENT USE OF INSULIN: ICD-10-CM

## 2024-08-09 NOTE — TELEPHONE ENCOUNTER
Caller: Janna Flowers    Relationship: Self    Best call back number: [phone]     What medication are you requesting: FREESTYLE TACOS SENSOR AND DEVICE, READER        Have you had these symptoms before:    [x] Yes  [] No    Have you been treated for these symptoms before:   [x] Yes  [] No    If a prescription is needed, what is your preferred pharmacy and phone number: [address] [phone] [facility] [phone] FX     Additional notes:

## 2024-08-20 ENCOUNTER — TELEPHONE (OUTPATIENT)
Dept: FAMILY MEDICINE CLINIC | Facility: CLINIC | Age: 74
End: 2024-08-20
Payer: MEDICARE

## 2024-08-20 RX ORDER — INSULIN ASPART INJECTION 100 [IU]/ML
INJECTION, SOLUTION SUBCUTANEOUS
Qty: 3 ML | Refills: 0 | Status: SHIPPED | OUTPATIENT
Start: 2024-08-20 | End: 2024-08-20 | Stop reason: SDUPTHER

## 2024-08-20 RX ORDER — INSULIN ASPART INJECTION 100 [IU]/ML
INJECTION, SOLUTION SUBCUTANEOUS
Qty: 3 ML | Refills: 0 | Status: SHIPPED | OUTPATIENT
Start: 2024-08-20

## 2024-08-20 NOTE — TELEPHONE ENCOUNTER
Caller: Janna Flowers    Relationship: Self    Best call back number: [phone]    What medication are you requesting: NOVALOG FAST ACTING    What are your current symptoms: HIGH BLOOD SUGAR    How long have you been experiencing symptoms:  COUPLE OF DAYS    Have you had these symptoms before:    [x] Yes  [] No    Have you been treated for these symptoms before:   [x] Yes  [] No    If a prescription is needed, what is your preferred pharmacy and phone number: [facility] [id number] - [place], [place] - [address] - [phone] [facility] [phone]      Additional notes:  PATIENT STATED THAT PLEASE PUT SHE HAS A DIABETES DIAGNOSIS.    PATIENT STATED THAT SHE CANNOT FIND THE INSULIN THAT WAS SENT AND [facility].    PATIENT NEEDS TODAY

## 2024-08-20 NOTE — TELEPHONE ENCOUNTER
Left detailed message on patient's voice mail at 4pm.    RELAY    Tell Janna I sent in the NovoLog for her to [facility].

## 2024-08-22 ENCOUNTER — TELEPHONE (OUTPATIENT)
Dept: FAMILY MEDICINE CLINIC | Facility: CLINIC | Age: 74
End: 2024-08-22

## 2024-09-16 ENCOUNTER — TELEPHONE (OUTPATIENT)
Dept: FAMILY MEDICINE CLINIC | Facility: CLINIC | Age: 74
End: 2024-09-16
Payer: MEDICARE

## 2024-09-26 ENCOUNTER — TELEPHONE (OUTPATIENT)
Dept: FAMILY MEDICINE CLINIC | Facility: CLINIC | Age: 74
End: 2024-09-26
Payer: MEDICARE

## 2024-10-28 ENCOUNTER — LAB (OUTPATIENT)
Dept: FAMILY MEDICINE CLINIC | Facility: CLINIC | Age: 74
End: 2024-10-28
Payer: MEDICARE

## 2024-10-28 DIAGNOSIS — Z79.4 TYPE 2 DIABETES MELLITUS WITH DIABETIC POLYNEUROPATHY, WITH LONG-TERM CURRENT USE OF INSULIN: Primary | ICD-10-CM

## 2024-10-28 DIAGNOSIS — D50.8 IRON DEFICIENCY ANEMIA SECONDARY TO INADEQUATE DIETARY IRON INTAKE: ICD-10-CM

## 2024-10-28 DIAGNOSIS — E11.42 TYPE 2 DIABETES MELLITUS WITH DIABETIC POLYNEUROPATHY, WITH LONG-TERM CURRENT USE OF INSULIN: Primary | ICD-10-CM

## 2024-10-28 DIAGNOSIS — D50.8 IRON DEFICIENCY ANEMIA SECONDARY TO INADEQUATE DIETARY IRON INTAKE: Primary | ICD-10-CM

## 2024-10-28 DIAGNOSIS — E78.2 MIXED HYPERLIPIDEMIA: ICD-10-CM

## 2024-10-28 LAB
ALBUMIN SERPL-MCNC: 3.9 G/DL (ref 3.5–5.2)
ALBUMIN/GLOB SERPL: 1.4 G/DL
ALP SERPL-CCNC: 43 U/L (ref 39–117)
ALT SERPL W P-5'-P-CCNC: 14 U/L (ref 1–33)
ANION GAP SERPL CALCULATED.3IONS-SCNC: 9.6 MMOL/L (ref 5–15)
AST SERPL-CCNC: 17 U/L (ref 1–32)
BASOPHILS # BLD AUTO: 0.03 10*3/MM3 (ref 0–0.2)
BASOPHILS NFR BLD AUTO: 0.4 % (ref 0–1.5)
BILIRUB SERPL-MCNC: <0.2 MG/DL (ref 0–1.2)
BUN SERPL-MCNC: 15 MG/DL (ref 8–23)
BUN/CREAT SERPL: 23.1 (ref 7–25)
CALCIUM SPEC-SCNC: 9.4 MG/DL (ref 8.6–10.5)
CHLORIDE SERPL-SCNC: 103 MMOL/L (ref 98–107)
CHOLEST SERPL-MCNC: 160 MG/DL (ref 0–200)
CO2 SERPL-SCNC: 29.4 MMOL/L (ref 22–29)
CREAT SERPL-MCNC: 0.65 MG/DL (ref 0.57–1)
DEPRECATED RDW RBC AUTO: 44 FL (ref 37–54)
EGFRCR SERPLBLD CKD-EPI 2021: 92.5 ML/MIN/1.73
EOSINOPHIL # BLD AUTO: 0.17 10*3/MM3 (ref 0–0.4)
EOSINOPHIL NFR BLD AUTO: 2.4 % (ref 0.3–6.2)
ERYTHROCYTE [DISTWIDTH] IN BLOOD BY AUTOMATED COUNT: 14 % (ref 12.3–15.4)
FERRITIN SERPL-MCNC: 25.5 NG/ML (ref 13–150)
GLOBULIN UR ELPH-MCNC: 2.8 GM/DL
GLUCOSE SERPL-MCNC: 78 MG/DL (ref 65–99)
HBA1C MFR BLD: 8.3 % (ref 4.8–5.6)
HCT VFR BLD AUTO: 37.3 % (ref 34–46.6)
HDLC SERPL-MCNC: 51 MG/DL (ref 40–60)
HGB BLD-MCNC: 11.4 G/DL (ref 12–15.9)
IMM GRANULOCYTES # BLD AUTO: 0.03 10*3/MM3 (ref 0–0.05)
IMM GRANULOCYTES NFR BLD AUTO: 0.4 % (ref 0–0.5)
IRON 24H UR-MRATE: 42 MCG/DL (ref 37–145)
IRON SATN MFR SERPL: 11 % (ref 20–50)
LDLC SERPL CALC-MCNC: 93 MG/DL (ref 0–100)
LDLC/HDLC SERPL: 1.8 {RATIO}
LYMPHOCYTES # BLD AUTO: 2.9 10*3/MM3 (ref 0.7–3.1)
LYMPHOCYTES NFR BLD AUTO: 40.9 % (ref 19.6–45.3)
MCH RBC QN AUTO: 26.6 PG (ref 26.6–33)
MCHC RBC AUTO-ENTMCNC: 30.6 G/DL (ref 31.5–35.7)
MCV RBC AUTO: 86.9 FL (ref 79–97)
MONOCYTES # BLD AUTO: 0.68 10*3/MM3 (ref 0.1–0.9)
MONOCYTES NFR BLD AUTO: 9.6 % (ref 5–12)
NEUTROPHILS NFR BLD AUTO: 3.28 10*3/MM3 (ref 1.7–7)
NEUTROPHILS NFR BLD AUTO: 46.3 % (ref 42.7–76)
NRBC BLD AUTO-RTO: 0 /100 WBC (ref 0–0.2)
PLATELET # BLD AUTO: 366 10*3/MM3 (ref 140–450)
PMV BLD AUTO: 10.9 FL (ref 6–12)
POTASSIUM SERPL-SCNC: 3.9 MMOL/L (ref 3.5–5.2)
PROT SERPL-MCNC: 6.7 G/DL (ref 6–8.5)
RBC # BLD AUTO: 4.29 10*6/MM3 (ref 3.77–5.28)
RETICS # AUTO: 0.07 10*6/MM3 (ref 0.02–0.13)
RETICS/RBC NFR AUTO: 1.67 % (ref 0.7–1.9)
SODIUM SERPL-SCNC: 142 MMOL/L (ref 136–145)
TIBC SERPL-MCNC: 368 MCG/DL (ref 298–536)
TRANSFERRIN SERPL-MCNC: 247 MG/DL (ref 200–360)
TRIGL SERPL-MCNC: 85 MG/DL (ref 0–150)
VLDLC SERPL-MCNC: 16 MG/DL (ref 5–40)
WBC NRBC COR # BLD AUTO: 7.09 10*3/MM3 (ref 3.4–10.8)

## 2024-10-28 PROCEDURE — 83540 ASSAY OF IRON: CPT | Performed by: FAMILY MEDICINE

## 2024-10-28 PROCEDURE — 82728 ASSAY OF FERRITIN: CPT | Performed by: FAMILY MEDICINE

## 2024-10-28 PROCEDURE — 80061 LIPID PANEL: CPT | Performed by: FAMILY MEDICINE

## 2024-10-28 PROCEDURE — 85025 COMPLETE CBC W/AUTO DIFF WBC: CPT | Performed by: FAMILY MEDICINE

## 2024-10-28 PROCEDURE — 83036 HEMOGLOBIN GLYCOSYLATED A1C: CPT | Performed by: FAMILY MEDICINE

## 2024-10-28 PROCEDURE — 36415 COLL VENOUS BLD VENIPUNCTURE: CPT

## 2024-10-28 PROCEDURE — 80053 COMPREHEN METABOLIC PANEL: CPT | Performed by: FAMILY MEDICINE

## 2024-10-28 PROCEDURE — 85045 AUTOMATED RETICULOCYTE COUNT: CPT | Performed by: FAMILY MEDICINE

## 2024-10-28 PROCEDURE — 84466 ASSAY OF TRANSFERRIN: CPT | Performed by: FAMILY MEDICINE

## 2024-10-29 DIAGNOSIS — Z79.4 TYPE 2 DIABETES MELLITUS WITH DIABETIC POLYNEUROPATHY, WITH LONG-TERM CURRENT USE OF INSULIN: ICD-10-CM

## 2024-10-29 DIAGNOSIS — E11.42 TYPE 2 DIABETES MELLITUS WITH DIABETIC POLYNEUROPATHY, WITH LONG-TERM CURRENT USE OF INSULIN: ICD-10-CM

## 2024-10-29 RX ORDER — INSULIN DEGLUDEC 100 U/ML
40 INJECTION, SOLUTION SUBCUTANEOUS DAILY
Qty: 9 ML | Refills: 3 | Status: SHIPPED | OUTPATIENT
Start: 2024-10-29

## 2024-10-29 RX ORDER — FERROUS SULFATE 325(65) MG
325 TABLET ORAL
Qty: 90 TABLET | Refills: 1 | Status: SHIPPED | OUTPATIENT
Start: 2024-10-29

## 2024-10-29 NOTE — PROGRESS NOTES
Zulay Saldivar to increase her insulin degludec ( Tresiba) to 40 units a day.  Continue the sliding scale with her insulin aspart.  Recheck A1c in 3 months

## 2024-10-29 NOTE — PROGRESS NOTES
Tell Janna that she also has a low iron saturation so I want her to take a few months of iron supplementation to see if we can correct that.  She just takes 1 a day in the morning with some orange juice.  It should correct it in a few months

## 2024-10-29 NOTE — PROGRESS NOTES
Spoke with patient regarding results. They expressed understanding.    Janna has never taken short acting insulin, I discontinued this from her chart. Will discuss further at her appointment with Dr. Ng Monday.

## 2024-11-04 ENCOUNTER — OFFICE VISIT (OUTPATIENT)
Dept: FAMILY MEDICINE CLINIC | Facility: CLINIC | Age: 74
End: 2024-11-04
Payer: MEDICARE

## 2024-11-04 VITALS
HEART RATE: 83 BPM | DIASTOLIC BLOOD PRESSURE: 72 MMHG | SYSTOLIC BLOOD PRESSURE: 142 MMHG | BODY MASS INDEX: 25.86 KG/M2 | WEIGHT: 137 LBS | HEIGHT: 61 IN | RESPIRATION RATE: 18 BRPM | OXYGEN SATURATION: 97 %

## 2024-11-04 DIAGNOSIS — Z79.4 TYPE 2 DIABETES MELLITUS WITH DIABETIC POLYNEUROPATHY, WITH LONG-TERM CURRENT USE OF INSULIN: Primary | ICD-10-CM

## 2024-11-04 DIAGNOSIS — E11.42 TYPE 2 DIABETES MELLITUS WITH DIABETIC POLYNEUROPATHY, WITH LONG-TERM CURRENT USE OF INSULIN: Primary | ICD-10-CM

## 2024-11-04 PROCEDURE — G2211 COMPLEX E/M VISIT ADD ON: HCPCS | Performed by: FAMILY MEDICINE

## 2024-11-04 PROCEDURE — 1160F RVW MEDS BY RX/DR IN RCRD: CPT | Performed by: FAMILY MEDICINE

## 2024-11-04 PROCEDURE — 3077F SYST BP >= 140 MM HG: CPT | Performed by: FAMILY MEDICINE

## 2024-11-04 PROCEDURE — 3052F HG A1C>EQUAL 8.0%<EQUAL 9.0%: CPT | Performed by: FAMILY MEDICINE

## 2024-11-04 PROCEDURE — 99214 OFFICE O/P EST MOD 30 MIN: CPT | Performed by: FAMILY MEDICINE

## 2024-11-04 PROCEDURE — 3078F DIAST BP <80 MM HG: CPT | Performed by: FAMILY MEDICINE

## 2024-11-04 PROCEDURE — 1159F MED LIST DOCD IN RCRD: CPT | Performed by: FAMILY MEDICINE

## 2024-11-04 PROCEDURE — 1126F AMNT PAIN NOTED NONE PRSNT: CPT | Performed by: FAMILY MEDICINE

## 2024-11-04 RX ORDER — PRAVASTATIN SODIUM 20 MG
20 TABLET ORAL DAILY
Qty: 90 TABLET | Refills: 1 | Status: SHIPPED | OUTPATIENT
Start: 2024-11-04

## 2024-11-04 RX ORDER — ACYCLOVIR 400 MG/1
1 TABLET ORAL
Qty: 9 EACH | Refills: 3 | Status: SHIPPED | OUTPATIENT
Start: 2024-11-04

## 2024-11-04 NOTE — PROGRESS NOTES
Chief Complaint  Diabetes, Hypertension, and Hyperlipidemia    Subjective        Janna Flowers presents to [facility]  History of Present Illness  The patient is a 74-year-old female who presents for evaluation of diabetes.    She reports frequent awakenings at night, up to twice, due to low blood sugar levels. Her current medication regimen includes Tresiba, taken once daily. She was previously on prednisone, which led to an increase in her blood sugar levels to 425, prompting her doctor to discontinue the medication. Her Tresiba dosage was gradually increased from 20 to 40 units over a period of time. She notes that her A1c level was 8.3 last month, higher than the previous reading three months prior, which she attributes to the lower Tresiba dosage she was on for a few months.    She has been experimenting with the timing of her Tresiba administration, taking it at different times of the day. She recalls an incident where her blood sugar dropped to 52 after taking Tresiba at 10:30 am, even after having a large breakfast. She managed to raise her blood sugar to 80 by taking glucose tablets. She typically eats three meals a day and has been incorporating a bedtime snack due to her low blood sugar levels. She has noticed that her blood sugar tends to drop if it is between 155 and 175 before bed. She has been trying to take her insulin at noon, but has found that taking it earlier does not seem to make a difference.    She has been recording her blood sugar levels before bed and upon waking. She has noticed that her blood sugar can rise to 360 or 380 by nighttime, which she tries to manage by drinking water. She usually wakes up at 6:30 am from Tuesday to Friday and goes to bed around 10:30 pm. She has been taking iron pills with 2 ounces of orange juice when her blood sugar drops. She has been using the Dexcom G7 for blood sugar monitoring and is due for a DEXA scan this  "year.    She had a mammogram earlier this year and plans to have another one next year. She reports no breathing difficulties during physical activity and does not notice any difference when her blood sugar is low.    FAMILY HISTORY  Her sister and mother had breast cancer.       Objective   Vital Signs:  /72   Pulse 83   Resp 18   Ht 154.9 cm (61\")   Wt 62.1 kg (137 lb)   SpO2 97%   BMI 25.89 kg/m²   Estimated body mass index is 25.89 kg/m² as calculated from the following:    Height as of this encounter: 154.9 cm (61\").    Weight as of this encounter: 62.1 kg (137 lb).              Physical Exam  Constitutional:       Appearance: Normal appearance. She is well-developed and normal weight.   HENT:      Head: Normocephalic and atraumatic.      Right Ear: Tympanic membrane, ear canal and external ear normal.      Left Ear: Tympanic membrane, ear canal and external ear normal.      Nose: Nose normal.      Mouth/Throat:      Mouth: Mucous membranes are moist.      Pharynx: Oropharynx is clear. No oropharyngeal exudate.   Eyes:      Extraocular Movements: Extraocular movements intact.      Conjunctiva/sclera: Conjunctivae normal.      Pupils: Pupils are equal, round, and reactive to light.   Cardiovascular:      Rate and Rhythm: Normal rate and regular rhythm.      Pulses: Normal pulses.      Heart sounds: Normal heart sounds.   Pulmonary:      Effort: Pulmonary effort is normal.      Breath sounds: Normal breath sounds.   Abdominal:      General: Bowel sounds are normal.      Palpations: Abdomen is soft.   Musculoskeletal:         General: Normal range of motion.      Cervical back: Normal range of motion and neck supple.   Skin:     General: Skin is warm and dry.   Neurological:      General: No focal deficit present.      Mental Status: She is alert and oriented to person, place, and time. Mental status is at baseline.   Psychiatric:         Mood and Affect: Mood normal.         Behavior: Behavior normal. "         Thought Content: Thought content normal.         Judgment: Judgment normal.        Result Review :          Results  Laboratory Studies  A1c was 8.3.                Assessment & Plan  1. Type 2 Diabetes Mellitus.  She is currently on a Tresiba dose of 40 units. Her last A1c was 8.3% last month, which was higher than three months prior. She reports episodes of low blood sugar, particularly in the early morning hours. We discussed the importance of eating meals at the same time every day and having a bedtime snack. Recommended snacks include apple slices with peanut butter, berries, or a bowl of oatmeal. She will reduce her Tresiba dose to 35 units and take it at 8:30 AM every day. She will ensure to eat breakfast, lunch, dinner, and a bedtime snack. If her blood sugar drops during the night, she should consume a snack. A hemoglobin A1c test will be done prior to her next visit.    2. Polyneuropathy.  She reports no significant changes in her symptoms. She will continue her current management plan.    3. Health Maintenance.  She usually gets a DEXA scan every two years and is due this year. She will schedule it next year along with her mammogram.    Follow-up  Return in 4 months for follow-up.            Follow Up     Return in about 4 months (around 3/4/2025), or if symptoms worsen or fail to improve, for Recheck.  Patient was given instructions and counseling regarding her condition or for health maintenance advice. Please see specific information pulled into the AVS if appropriate.     Patient or patient representative verbalized consent for the use of Ambient Listening during the visit with  Donnie Ng MD for chart documentation. 11/4/2024  11:58 EST  Answers submitted by the patient for this visit:  Primary Reason for Visit (Submitted on 11/3/2024)  What is the primary reason for your visit?: Diabetes  Diabetes Questionnaire (Submitted on 11/3/2024)  Chief Complaint: Diabetes problem  Diabetes  type: type 2  Disease duration: 26 Years  Treatment compliance: all of the time  Symptom course: stable  Home blood tests: 1-2 x per day  High score: >200  Below 70: daily  blurred vision: No  foot paresthesias: Yes  foot ulcerations: No  polydipsia: No  polyuria: No  weight loss: No  blackouts: No  hospitalization: No  nocturnal hypoglycemia: Yes  required assistance: No  required glucagon: Yes  confusion: No  headaches: No  speech difficulty: No  sweats: Yes  tremors: Yes  Dose schedule: pre-lunch  Given by: patient  Injection sites: abdominal wall  Current diet: generally healthy  Meal planning: carbohydrate counting  Exercise: five times a week  Eye exam current: Yes  Sees podiatrist: Yes

## 2024-11-18 ENCOUNTER — TELEPHONE (OUTPATIENT)
Dept: FAMILY MEDICINE CLINIC | Facility: CLINIC | Age: 74
End: 2024-11-18

## 2024-11-18 NOTE — TELEPHONE ENCOUNTER
Caller: Janna Flowers    Relationship: Self    Best call back number: [phone]         Who are you requesting to speak with (clinical staff, provider,  specific staff member):       What was the call regarding: PATIENT STATED THAT WHEN SHE STARTS TO EATS THE SUGAR STAYS GOOD 120, BUT  ONE HOUR- FEW HOURS AFTER SHE EATS ITS ALMOST 300- 350. PATIENT WANTED TO KNOW IF THIS IS NORMAL OR IF HER TRESIBA NEEDS TO BE INCREASED IN DOSAGE?    PLEASE CALL AND ADVISE

## 2024-12-06 ENCOUNTER — TELEPHONE (OUTPATIENT)
Dept: FAMILY MEDICINE CLINIC | Facility: CLINIC | Age: 74
End: 2024-12-06

## 2024-12-06 RX ORDER — SULFAMETHOXAZOLE AND TRIMETHOPRIM 800; 160 MG/1; MG/1
1 TABLET ORAL 2 TIMES DAILY
Qty: 6 TABLET | Refills: 0 | Status: SHIPPED | OUTPATIENT
Start: 2024-12-06 | End: 2024-12-09

## 2024-12-06 NOTE — TELEPHONE ENCOUNTER
Caller: Janna Flowers    Relationship: Self    Best call back number: [phone]     What medication are you requesting: MEDICATION FOR A UTI    What are your current symptoms: BURNING WHILE URINATING    How long have you been experiencing symptoms: 1 DAY    Have you had these symptoms before:    [x] Yes  [] No    Have you been treated for these symptoms before:   [x] Yes  [] No    If a prescription is needed, what is your preferred pharmacy and phone number: [facility] [id number] - [place], [place] - [address] - [phone] [facility] [phone]      Additional notes:

## 2024-12-30 RX ORDER — LISINOPRIL 10 MG/1
10 TABLET ORAL DAILY
Qty: 90 TABLET | Refills: 3 | Status: SHIPPED | OUTPATIENT
Start: 2024-12-30

## 2025-01-15 ENCOUNTER — OFFICE VISIT (OUTPATIENT)
Dept: FAMILY MEDICINE CLINIC | Facility: CLINIC | Age: 75
End: 2025-01-15
Payer: MEDICARE

## 2025-01-15 VITALS
OXYGEN SATURATION: 97 % | SYSTOLIC BLOOD PRESSURE: 138 MMHG | RESPIRATION RATE: 16 BRPM | WEIGHT: 143 LBS | BODY MASS INDEX: 27 KG/M2 | HEART RATE: 85 BPM | HEIGHT: 61 IN | DIASTOLIC BLOOD PRESSURE: 74 MMHG

## 2025-01-15 DIAGNOSIS — Z12.31 ENCOUNTER FOR SCREENING MAMMOGRAM FOR MALIGNANT NEOPLASM OF BREAST: ICD-10-CM

## 2025-01-15 DIAGNOSIS — E55.9 VITAMIN D DEFICIENCY, UNSPECIFIED: ICD-10-CM

## 2025-01-15 DIAGNOSIS — M19.90 OSTEOARTHRITIS, UNSPECIFIED OSTEOARTHRITIS TYPE, UNSPECIFIED SITE: ICD-10-CM

## 2025-01-15 DIAGNOSIS — E11.42 TYPE 2 DIABETES MELLITUS WITH DIABETIC POLYNEUROPATHY, WITH LONG-TERM CURRENT USE OF INSULIN: ICD-10-CM

## 2025-01-15 DIAGNOSIS — Z00.00 MEDICARE ANNUAL WELLNESS VISIT, SUBSEQUENT: Primary | ICD-10-CM

## 2025-01-15 DIAGNOSIS — Z79.4 TYPE 2 DIABETES MELLITUS WITH DIABETIC POLYNEUROPATHY, WITH LONG-TERM CURRENT USE OF INSULIN: ICD-10-CM

## 2025-01-15 DIAGNOSIS — G63 POLYNEUROPATHY ASSOCIATED WITH UNDERLYING DISEASE: ICD-10-CM

## 2025-01-15 DIAGNOSIS — E78.2 MIXED HYPERLIPIDEMIA: ICD-10-CM

## 2025-01-15 DIAGNOSIS — I10 ESSENTIAL HYPERTENSION: ICD-10-CM

## 2025-01-15 PROBLEM — B34.9 VIRAL SYNDROME: Status: RESOLVED | Noted: 2023-06-09 | Resolved: 2025-01-15

## 2025-01-15 PROBLEM — M67.911 DISORDER OF RIGHT ROTATOR CUFF: Status: RESOLVED | Noted: 2023-03-13 | Resolved: 2025-01-15

## 2025-01-15 PROBLEM — G43.501 PERSISTENT MIGRAINE AURA WITHOUT CEREBRAL INFARCTION AND WITH STATUS MIGRAINOSUS, NOT INTRACTABLE: Status: RESOLVED | Noted: 2023-11-02 | Resolved: 2025-01-15

## 2025-01-15 PROCEDURE — 3075F SYST BP GE 130 - 139MM HG: CPT | Performed by: FAMILY MEDICINE

## 2025-01-15 PROCEDURE — 99214 OFFICE O/P EST MOD 30 MIN: CPT | Performed by: FAMILY MEDICINE

## 2025-01-15 PROCEDURE — 1126F AMNT PAIN NOTED NONE PRSNT: CPT | Performed by: FAMILY MEDICINE

## 2025-01-15 PROCEDURE — 1170F FXNL STATUS ASSESSED: CPT | Performed by: FAMILY MEDICINE

## 2025-01-15 PROCEDURE — 1159F MED LIST DOCD IN RCRD: CPT | Performed by: FAMILY MEDICINE

## 2025-01-15 PROCEDURE — G0439 PPPS, SUBSEQ VISIT: HCPCS | Performed by: FAMILY MEDICINE

## 2025-01-15 PROCEDURE — 3078F DIAST BP <80 MM HG: CPT | Performed by: FAMILY MEDICINE

## 2025-01-15 PROCEDURE — 1160F RVW MEDS BY RX/DR IN RCRD: CPT | Performed by: FAMILY MEDICINE

## 2025-01-15 RX ORDER — ALCOHOL 2.38 KG/3.79L
1 GEL TOPICAL 2 TIMES DAILY
Qty: 60 CAPSULE | Refills: 6 | Status: SHIPPED | OUTPATIENT
Start: 2025-01-15

## 2025-01-15 NOTE — PROGRESS NOTES
Subjective   The ABCs of the Annual Wellness Visit  Medicare Wellness Visit      Janna Flowers is a 74 y.o. patient who presents for a Medicare Wellness Visit.    The following portions of the patient's history were reviewed and   updated as appropriate: allergies, current medications, past family history, past medical history, past social history, past surgical history, and problem list.    Compared to one year ago, the patient's physical   health is the same.  Compared to one year ago, the patient's mental   health is the same.    Recent Hospitalizations:  She was not admitted to the hospital during the last year.     Current Medical Providers:  Patient Care Team:  Donnie Ng MD as PCP - General  Dick Crespo OD (Optometry)  Banet, Duane Edward, MD as Consulting Physician (Dermatology)  Brooks Scott DPM as Consulting Physician (Podiatry)  Gabriel Melissa DDS (Dental General Practice)    Outpatient Medications Prior to Visit   Medication Sig Dispense Refill    Blood Glucose Monitoring Suppl (ACCU-CHEK COMPACT CARE KIT) kit ACCU-CHEK COMPACT PLUS CARE KIT      Continuous Glucose Sensor (Dexcom G7 Sensor) misc Use 1 Device Every 10 (Ten) Days. 9 each 3    cyanocobalamin (VITAMIN B-12) 50 MCG tablet Take 1 tablet by mouth Every Other Day.      ferrous sulfate 325 (65 FE) MG tablet Take 1 tablet by mouth Daily With Breakfast. Take with 2 ounces of orange juice 90 tablet 1    glucose blood (Accu-Chek Guide) test strip Test twice daily     DX E11.9 100 each 3    insulin degludec (Tresiba FlexTouch) 100 UNIT/ML solution pen-injector injection Inject 40 Units under the skin into the appropriate area as directed Daily. (Patient taking differently: Inject 35 Units under the skin into the appropriate area as directed Daily.) 9 mL 3    Insulin Pen Needle 31G X 8 MM misc Use 1 Needle Daily. 90 each 4    lisinopril (PRINIVIL,ZESTRIL) 10 MG tablet TAKE 1 TABLET BY MOUTH DAILY 90 tablet 3  "   metFORMIN (GLUCOPHAGE) 1000 MG tablet Take 1 tablet by mouth 2 (Two) Times a Day. 180 tablet 0    pravastatin (PRAVACHOL) 20 MG tablet TAKE 1 TABLET BY MOUTH DAILY 90 tablet 1    benzonatate (TESSALON) 200 MG capsule Take 1 capsule by mouth 3 (Three) Times a Day As Needed for Cough. (Patient not taking: Reported on 1/15/2025) 30 capsule 0     No facility-administered medications prior to visit.     No opioid medication identified on active medication list. I have reviewed chart for other potential  high risk medication/s and harmful drug interactions in the elderly.      Aspirin is not on active medication list.  Aspirin use is not indicated based on review of current medical condition/s. Risk of harm outweighs potential benefits.  .    Patient Active Problem List   Diagnosis    Type 2 diabetes mellitus with diabetic polyneuropathy, with long-term current use of insulin    Essential hypertension    Hyperlipidemia    Osteoarthritis    Peripheral neuropathy    Vitamin B deficiency    Presbyopia    Vitreous degeneration    Combined form of senile cataract    Encounter for screening mammogram for malignant neoplasm of breast    Osteopenia of multiple sites    Vertigo    UTI (urinary tract infection)    Vitamin D deficiency, unspecified    Hypoglycemia     Advance Care Planning Advance Directive is on file.  ACP discussion was held with the patient during this visit. Patient has an advance directive in EMR which is still valid.             Objective   Vitals:    01/15/25 1328   BP: 138/74   Pulse: 85   Resp: 16   SpO2: 97%   Weight: 64.9 kg (143 lb)   Height: 154.9 cm (61\")   PainSc: 0-No pain       Estimated body mass index is 27.02 kg/m² as calculated from the following:    Height as of this encounter: 154.9 cm (61\").    Weight as of this encounter: 64.9 kg (143 lb).               Does the patient have evidence of cognitive impairment? No  Lab Results   Component Value Date    TRIG 85 10/28/2024    HDL 51 10/28/2024 "    LDL 93 10/28/2024    VLDL 16 10/28/2024    HGBA1C 8.30 (H) 10/28/2024                                                                                                Health  Risk Assessment    Smoking Status:  Social History     Tobacco Use   Smoking Status Never   Smokeless Tobacco Never     Alcohol Consumption:  Social History     Substance and Sexual Activity   Alcohol Use No       Fall Risk Screen  STEADI Fall Risk Assessment was completed, and patient is at LOW risk for falls.Assessment completed on:1/15/2025    Depression Screening   Little interest or pleasure in doing things? Not at all   Feeling down, depressed, or hopeless? Not at all   PHQ-2 Total Score 0      Health Habits and Functional and Cognitive Screenin/14/2025     5:55 PM   Functional & Cognitive Status   Do you have difficulty preparing food and eating? No    Do you have difficulty bathing yourself, getting dressed or grooming yourself? No    Do you have difficulty using the toilet? No    Do you have difficulty moving around from place to place? No    Do you have trouble with steps or getting out of a bed or a chair? No    Current Diet Well Balanced Diet    Dental Exam Up to date    Eye Exam Up to date    Exercise (times per week) 5 times per week    Current Exercises Include Cardiovascular Workout    Do you need help using the phone?  No    Are you deaf or do you have serious difficulty hearing?  No    Do you need help to go to places out of walking distance? No    Do you need help shopping? No    Do you need help preparing meals?  No    Do you need help with housework?  No    Do you need help with laundry? No    Do you need help taking your medications? No    Do you need help managing money? No    Do you ever drive or ride in a car without wearing a seat belt? No    Have you felt unusual stress, anger or loneliness in the last month? No    Who do you live with? Spouse    If you need help, do you have trouble finding someone  available to you? No    Have you been bothered in the last four weeks by sexual problems? No    Do you have difficulty concentrating, remembering or making decisions? No        Patient-reported           Age-appropriate Screening Schedule:  Refer to the list below for future screening recommendations based on patient's age, sex and/or medical conditions. Orders for these recommended tests are listed in the plan section. The patient has been provided with a written plan.    Health Maintenance List  Health Maintenance   Topic Date Due    BMI FOLLOWUP  Never done    DXA SCAN  07/28/2024    ANNUAL WELLNESS VISIT  12/23/2024    DIABETIC EYE EXAM  02/16/2025    HEMOGLOBIN A1C  04/28/2025    DIABETIC FOOT EXAM  07/02/2025    LIPID PANEL  10/28/2025    MAMMOGRAM  04/22/2026    TDAP/TD VACCINES (2 - Td or Tdap) 10/04/2030    COLORECTAL CANCER SCREENING  07/25/2032    HEPATITIS C SCREENING  Completed    COVID-19 Vaccine  Completed    INFLUENZA VACCINE  Completed    Pneumococcal Vaccine 65+  Completed    ZOSTER VACCINE  Completed    URINE MICROALBUMIN  Discontinued                                                                                                                                                CMS Preventative Services Quick Reference  Risk Factors Identified During Encounter      The above risks/problems have been discussed with the patient.  Pertinent information has been shared with the patient in the After Visit Summary.  An After Visit Summary and PPPS were made available to the patient.    Follow Up:   Next Medicare Wellness visit to be scheduled in 1 year.         Additional E&M Note during same encounter follows:  Patient has additional, significant, and separately identifiable condition(s)/problem(s) that require work above and beyond the Medicare Wellness Visit     Chief Complaint  Medicare Wellness-subsequent    Subjective    HPI         The patient is a 74-year-old female who presents for her Medicare  annual wellness visit.    She has been managing her diabetes with insulin, initially at a dose of 40 units, which was subsequently reduced to 35 units following a decrease in her A1c level from 8.3 to an unspecified value. Her next A1c test is scheduled for 02/03/2025. She reports experiencing hypoglycemic episodes, which she manages by consuming an apple with peanut butter. She also reports weight gain despite a reduced appetite and dietary intake. She does not experience nightly hypoglycemic alarms but notes that her blood glucose levels can fluctuate between 250 and 254, triggering the alarm. However, there have been instances where her blood glucose level dropped to 153 without triggering the alarm. She has been advised to include oatmeal in her diet.    She reports no current issues with migraines but experiences nasal congestion. She has attempted to manage this with Claritin and a nasal spray, but these interventions have not provided relief. She has also tried using hearing aids without success.    She maintains an active lifestyle, exercising five days a week, and reports feeling well overall. She does not experience any chest pain or pressure. She reports a good appetite and attempts to monitor her carbohydrate intake.    She experiences neuropathic symptoms in her feet, including burning and itching sensations, which are intermittently bothersome. She has been prescribed a topical cream for these symptoms but is interested in exploring oral medication options.    She is up to date on her colonoscopy and mammogram screenings. She had a colonoscopy in 2022 and is due for a mammogram in February 2024. She is also up to date on her influenza and COVID-19 vaccinations. She receives regular eye examinations every six months and has an appointment with her podiatrist scheduled for July 2024. She also sees her dentist every six months and consults with a dermatologist annually. She does not see any other  "specialists such as cardiology, urology, neurology, or allergy. She has received both doses of the shingles vaccine and at least four doses of the COVID-19 vaccine.    FAMILY HISTORY  Her mother's sister had breast cancer.    MEDICATIONS  Current: insulin, iron supplement, Claritin    IMMUNIZATIONS  She has had her influenza vaccine and COVID-19 vaccines. She has received both the 23-valent and 13-valent pneumococcal vaccines. She has had both doses of the shingles vaccine.          Objective   Vital Signs:  /74   Pulse 85   Resp 16   Ht 154.9 cm (61\")   Wt 64.9 kg (143 lb)   SpO2 97%   BMI 27.02 kg/m²   Physical Exam  Constitutional:       Appearance: Normal appearance. She is well-developed and normal weight.   HENT:      Head: Normocephalic and atraumatic.      Right Ear: Tympanic membrane, ear canal and external ear normal.      Left Ear: Tympanic membrane, ear canal and external ear normal.      Nose: Nose normal.      Mouth/Throat:      Mouth: Mucous membranes are moist.      Pharynx: Oropharynx is clear. No oropharyngeal exudate.   Eyes:      Extraocular Movements: Extraocular movements intact.      Conjunctiva/sclera: Conjunctivae normal.      Pupils: Pupils are equal, round, and reactive to light.   Cardiovascular:      Rate and Rhythm: Normal rate and regular rhythm.      Pulses: Normal pulses.      Heart sounds: Normal heart sounds.   Pulmonary:      Effort: Pulmonary effort is normal.      Breath sounds: Normal breath sounds.   Abdominal:      General: Bowel sounds are normal.      Palpations: Abdomen is soft.   Musculoskeletal:         General: Normal range of motion.      Cervical back: Normal range of motion and neck supple.   Skin:     General: Skin is warm and dry.   Neurological:      General: No focal deficit present.      Mental Status: She is alert and oriented to person, place, and time. Mental status is at baseline.   Psychiatric:         Mood and Affect: Mood normal.         " Behavior: Behavior normal.         Thought Content: Thought content normal.         Judgment: Judgment normal.           Oral exam was performed.  Lungs were auscultated.    Vital Signs  Blood pressure reading is 138/74.      Lab Results   Component Value Date    TRIG 85 10/28/2024    HDL 51 10/28/2024    LDL 93 10/28/2024    VLDL 16 10/28/2024    HGBA1C 8.30 (H) 10/28/2024                  Medicare Wellness  Assessment and Plan   Upon arrival to the room the patient underwent the Medicare health risk assessment.  Neither the questions themselves or the answers that were given prompted any major concern on the part of the patient or by the medical staff that gave the assessment.  As far as the preventative care examinations and the preventative care immunizations that this patient requires they are as listed below.   Screening tests recommended:    Colonoscopy- utd   Mammogram-utd  DEXA-utd  PAP/ Pelvic-not needed  Diabetic eye exam-utd  Diabetic foot exam-utd  Dentist- utd  Derm-utd  Immunization:  Influenza- utd  Prevnar-utd  Pneumovax-utd  Tetanusutd  Shingles vaccine-utd  Hepatitis -utd  Covid -utd  RSV  - after 75                 2. Hyperlipidemia.  Her lipid panel will be rechecked in a few weeks. Depending on the results, adjustments in her medications will be made. In the meantime, she will follow a low-carb, low-calorie diet.    3. Type 2 diabetes with diabetic polyneuropathy and long-term current use of insulin.  She has diabetes, is on insulin, and we are trying to manage her control as best we can. She had an A1c done back in October 2024, and it had increased to 8.3. Changes were made in her insulin, and it is hoped that her levels are starting to come down now. Her A1c will be rechecked during the first week of February 2025.    4. Osteoarthritis.  She has arthritis in multiple joints, including her back, hips, knees, and hands. She experiences pain and stiffness some days, while other days are not as  bad. She uses Tylenol often and occasionally needs to take a nonsteroidal anti-inflammatory for a few days to get relief. She is advised to keep moving and apply heat to the joints if they feel stiff and sore.    5. Polyneuropathy.  She experiences numbness and tingling in her feet, particularly on the bottom and tops of her metatarsal areas, which extends into her toes at times. Some days she has no symptoms, while other weeks she has significant pain on a daily basis. She will start taking folic acid twice a day and is advised to get some magnesium balm and rub it into her feet every night. She sees a podiatrist, who will be asked to help with the care of this condition.    6. Vitamin D deficiency.  Her vitamin D levels will be rechecked when she has blood work done in a few weeks. If she needs replacement, it will be started.    7. Encounter for screening mammogram.  Her mammogram and DEXA scan will be ordered. She will do both together at [facility] when she is a year and at least a day from her last one.    8. Essential hypertension.  Her blood pressure today was 138/74. She is doing well as far as her blood pressure is concerned. We will continue her current medications.            Orders Placed This Encounter   Procedures    DEXA Bone Density Axial     Standing Status:   Future     Standing Expiration Date:   1/15/2026     Order Specific Question:   Is patient taking or have taken long term Glucocorticoid (steroids)?     Answer:   No     Order Specific Question:   Does the patient have rheumatoid arthritis?     Answer:   No     Order Specific Question:   Does the patient have secondary osteoporosis?     Answer:   No     Order Specific Question:   Reason for Exam:     Answer:   post menopausal     Order Specific Question:   Release to patient     Answer:   Routine Release [[id number]]    Mammo Screening Digital Tomosynthesis Bilateral With CAD     Standing Status:   Future     Standing Expiration Date:    1/15/2026     Order Specific Question:   Reason for Exam:     Answer:   screening for breast cancer     Order Specific Question:   Release to patient     Answer:   Routine Release [[id number]]    Comprehensive Metabolic Panel     Standing Status:   Future     Standing Expiration Date:   1/15/2026     Order Specific Question:   Release to patient     Answer:   Routine Release [[id number]]    Hemoglobin A1c     Standing Status:   Future     Standing Expiration Date:   1/15/2026     Order Specific Question:   Release to patient     Answer:   Routine Release [[id number]]    Lipid Panel     Standing Status:   Future     Standing Expiration Date:   1/15/2026     Order Specific Question:   Release to patient     Answer:   Routine Release [[id number]]    T4, Free     Standing Status:   Future     Standing Expiration Date:   1/15/2026     Order Specific Question:   Release to patient     Answer:   Routine Release [[id number]]    TSH     Standing Status:   Future     Standing Expiration Date:   1/15/2026     Order Specific Question:   Release to patient     Answer:   Routine Release [[id number]]    Urinalysis With Culture If Indicated - Urine, Clean Catch     Standing Status:   Future     Standing Expiration Date:   1/15/2026     Order Specific Question:   Release to patient     Answer:   Routine Release [[id number]]    Vitamin D,25-Hydroxy     Standing Status:   Future     Standing Expiration Date:   1/15/2026     Order Specific Question:   Release to patient     Answer:   Routine Release [[id number]]    Vitamin B12     Standing Status:   Future     Standing Expiration Date:   1/15/2026     Order Specific Question:   Release to patient     Answer:   Routine Release [[id number]]    CBC & Differential     Standing Status:   Future     Standing Expiration Date:   1/15/2026     Order Specific Question:   Manual Differential     Answer:   No     Order Specific Question:   Release to patient     Answer:   Routine Release  [[id number]]     New Medications Ordered This Visit   Medications    l-methylfolate-algae-B12-B6 3-90.314-2-35 MG capsule capsule     Sig: Take 1 capsule by mouth 2 (Two) Times a Day.     Dispense:  60 capsule     Refill:  6          Follow Up   Return in about 1 year (around 1/15/2026) for Medicare Wellness- 1 year, Medicare Wellness.  Patient was given instructions and counseling regarding her condition or for health maintenance advice. Please see specific information pulled into the AVS if appropriate.  Patient or patient representative verbalized consent for the use of Ambient Listening during the visit with  Donnie Ng MD for chart documentation. 1/15/2025  14:06 EST

## 2025-01-17 ENCOUNTER — TELEPHONE (OUTPATIENT)
Dept: FAMILY MEDICINE CLINIC | Facility: CLINIC | Age: 75
End: 2025-01-17

## 2025-01-17 NOTE — TELEPHONE ENCOUNTER
Caller: Janna Flowers    Relationship to patient: Self    Patient is needing: PATIENT STATES SHE IS NOT SURE WHAT WAS CALLED IN FOR HER ON WEDNESDAY, STATING B12 OR B6, AND SHE IS NOT SURE WHAT IT IS EVEN FOR, BUT THAT SHE WILL NOT BE TAKING IT BECAUSE EVEN WITH THE DISCOUNT, IT WILL COST $113 PER MONTH.    SHE STATES UNLESS THIS IS A MATTER OF LIFE OR DEATH, SHE DOES NOT WANT TO TAKE IT.   PATIENT ALSO WANTS A CALLBACK TO TELL HER WHAT THE MEDICATION IS FOR.  CALL PATIENT  [phone] OR LEAVE HER A MESSAGE ON [employer].

## 2025-01-27 ENCOUNTER — LAB (OUTPATIENT)
Dept: FAMILY MEDICINE CLINIC | Facility: CLINIC | Age: 75
End: 2025-01-27
Payer: MEDICARE

## 2025-01-27 DIAGNOSIS — Z00.00 MEDICARE ANNUAL WELLNESS VISIT, SUBSEQUENT: ICD-10-CM

## 2025-01-27 DIAGNOSIS — E55.9 VITAMIN D DEFICIENCY, UNSPECIFIED: ICD-10-CM

## 2025-01-27 DIAGNOSIS — E78.2 MIXED HYPERLIPIDEMIA: ICD-10-CM

## 2025-01-27 DIAGNOSIS — I10 ESSENTIAL HYPERTENSION: ICD-10-CM

## 2025-01-27 DIAGNOSIS — G63 POLYNEUROPATHY ASSOCIATED WITH UNDERLYING DISEASE: ICD-10-CM

## 2025-01-27 DIAGNOSIS — Z79.4 TYPE 2 DIABETES MELLITUS WITH DIABETIC POLYNEUROPATHY, WITH LONG-TERM CURRENT USE OF INSULIN: ICD-10-CM

## 2025-01-27 DIAGNOSIS — E11.42 TYPE 2 DIABETES MELLITUS WITH DIABETIC POLYNEUROPATHY, WITH LONG-TERM CURRENT USE OF INSULIN: ICD-10-CM

## 2025-01-27 DIAGNOSIS — M19.90 OSTEOARTHRITIS, UNSPECIFIED OSTEOARTHRITIS TYPE, UNSPECIFIED SITE: ICD-10-CM

## 2025-01-27 LAB
25(OH)D3 SERPL-MCNC: 35.2 NG/ML (ref 30–100)
ALBUMIN SERPL-MCNC: 4.2 G/DL (ref 3.5–5.2)
ALBUMIN/GLOB SERPL: 1.5 G/DL
ALP SERPL-CCNC: 47 U/L (ref 39–117)
ALT SERPL W P-5'-P-CCNC: 15 U/L (ref 1–33)
ANION GAP SERPL CALCULATED.3IONS-SCNC: 10 MMOL/L (ref 5–15)
AST SERPL-CCNC: 19 U/L (ref 1–32)
BASOPHILS # BLD AUTO: 0.05 10*3/MM3 (ref 0–0.2)
BASOPHILS NFR BLD AUTO: 0.7 % (ref 0–1.5)
BILIRUB SERPL-MCNC: 0.2 MG/DL (ref 0–1.2)
BILIRUB UR QL STRIP: NEGATIVE
BUN SERPL-MCNC: 19 MG/DL (ref 8–23)
BUN/CREAT SERPL: 25 (ref 7–25)
CALCIUM SPEC-SCNC: 9.2 MG/DL (ref 8.6–10.5)
CHLORIDE SERPL-SCNC: 99 MMOL/L (ref 98–107)
CHOLEST SERPL-MCNC: 172 MG/DL (ref 0–200)
CLARITY UR: CLEAR
CO2 SERPL-SCNC: 30 MMOL/L (ref 22–29)
COLOR UR: YELLOW
CREAT SERPL-MCNC: 0.76 MG/DL (ref 0.57–1)
DEPRECATED RDW RBC AUTO: 39.2 FL (ref 37–54)
EGFRCR SERPLBLD CKD-EPI 2021: 82.3 ML/MIN/1.73
EOSINOPHIL # BLD AUTO: 0.24 10*3/MM3 (ref 0–0.4)
EOSINOPHIL NFR BLD AUTO: 3.2 % (ref 0.3–6.2)
ERYTHROCYTE [DISTWIDTH] IN BLOOD BY AUTOMATED COUNT: 13.1 % (ref 12.3–15.4)
GLOBULIN UR ELPH-MCNC: 2.8 GM/DL
GLUCOSE SERPL-MCNC: 147 MG/DL (ref 65–99)
GLUCOSE UR STRIP-MCNC: NEGATIVE MG/DL
HBA1C MFR BLD: 8 % (ref 4.8–5.6)
HCT VFR BLD AUTO: 38.6 % (ref 34–46.6)
HDLC SERPL-MCNC: 57 MG/DL (ref 40–60)
HGB BLD-MCNC: 12.6 G/DL (ref 12–15.9)
HGB UR QL STRIP.AUTO: NEGATIVE
HOLD SPECIMEN: NORMAL
IMM GRANULOCYTES # BLD AUTO: 0.01 10*3/MM3 (ref 0–0.05)
IMM GRANULOCYTES NFR BLD AUTO: 0.1 % (ref 0–0.5)
KETONES UR QL STRIP: NEGATIVE
LDLC SERPL CALC-MCNC: 99 MG/DL (ref 0–100)
LDLC/HDLC SERPL: 1.71 {RATIO}
LEUKOCYTE ESTERASE UR QL STRIP.AUTO: NEGATIVE
LYMPHOCYTES # BLD AUTO: 2.89 10*3/MM3 (ref 0.7–3.1)
LYMPHOCYTES NFR BLD AUTO: 38.3 % (ref 19.6–45.3)
MCH RBC QN AUTO: 27.2 PG (ref 26.6–33)
MCHC RBC AUTO-ENTMCNC: 32.6 G/DL (ref 31.5–35.7)
MCV RBC AUTO: 83.2 FL (ref 79–97)
MONOCYTES # BLD AUTO: 0.72 10*3/MM3 (ref 0.1–0.9)
MONOCYTES NFR BLD AUTO: 9.5 % (ref 5–12)
NEUTROPHILS NFR BLD AUTO: 3.64 10*3/MM3 (ref 1.7–7)
NEUTROPHILS NFR BLD AUTO: 48.2 % (ref 42.7–76)
NITRITE UR QL STRIP: NEGATIVE
NRBC BLD AUTO-RTO: 0 /100 WBC (ref 0–0.2)
PH UR STRIP.AUTO: 6.5 [PH] (ref 5–8)
PLATELET # BLD AUTO: 367 10*3/MM3 (ref 140–450)
PMV BLD AUTO: 11 FL (ref 6–12)
POTASSIUM SERPL-SCNC: 4.6 MMOL/L (ref 3.5–5.2)
PROT SERPL-MCNC: 7 G/DL (ref 6–8.5)
PROT UR QL STRIP: NEGATIVE
RBC # BLD AUTO: 4.64 10*6/MM3 (ref 3.77–5.28)
SODIUM SERPL-SCNC: 139 MMOL/L (ref 136–145)
SP GR UR STRIP: 1.01 (ref 1–1.03)
T4 FREE SERPL-MCNC: 1.3 NG/DL (ref 0.92–1.68)
TRIGL SERPL-MCNC: 87 MG/DL (ref 0–150)
TSH SERPL DL<=0.05 MIU/L-ACNC: 3.06 UIU/ML (ref 0.27–4.2)
UROBILINOGEN UR QL STRIP: NORMAL
VIT B12 BLD-MCNC: 586 PG/ML (ref 211–946)
VLDLC SERPL-MCNC: 16 MG/DL (ref 5–40)
WBC NRBC COR # BLD AUTO: 7.55 10*3/MM3 (ref 3.4–10.8)

## 2025-01-27 PROCEDURE — 36415 COLL VENOUS BLD VENIPUNCTURE: CPT

## 2025-01-27 PROCEDURE — 83036 HEMOGLOBIN GLYCOSYLATED A1C: CPT | Performed by: FAMILY MEDICINE

## 2025-01-27 PROCEDURE — 80061 LIPID PANEL: CPT | Performed by: FAMILY MEDICINE

## 2025-01-27 PROCEDURE — 84439 ASSAY OF FREE THYROXINE: CPT | Performed by: FAMILY MEDICINE

## 2025-01-27 PROCEDURE — 82607 VITAMIN B-12: CPT | Performed by: FAMILY MEDICINE

## 2025-01-27 PROCEDURE — 80053 COMPREHEN METABOLIC PANEL: CPT | Performed by: FAMILY MEDICINE

## 2025-01-27 PROCEDURE — 81003 URINALYSIS AUTO W/O SCOPE: CPT | Performed by: FAMILY MEDICINE

## 2025-01-27 PROCEDURE — 85025 COMPLETE CBC W/AUTO DIFF WBC: CPT | Performed by: FAMILY MEDICINE

## 2025-01-27 PROCEDURE — 82306 VITAMIN D 25 HYDROXY: CPT | Performed by: FAMILY MEDICINE

## 2025-01-27 PROCEDURE — 84443 ASSAY THYROID STIM HORMONE: CPT | Performed by: FAMILY MEDICINE

## 2025-01-28 NOTE — PROGRESS NOTES
[employer] message seen by patient. No further action required.     Patient is also scheduled with [facility] on 2/3/25 to discuss results further.

## 2025-01-28 NOTE — PROGRESS NOTES
Zulay tell Janna I would like to see her increase the Tresiba insulin to 40 units daily.  It looks like she has been on 35 units.  Lets go up to 40 and see if we can get her A1c to come down even further.  Recheck the labs in 3 to 4 months

## 2025-02-03 ENCOUNTER — OFFICE VISIT (OUTPATIENT)
Dept: FAMILY MEDICINE CLINIC | Facility: CLINIC | Age: 75
End: 2025-02-03
Payer: MEDICARE

## 2025-02-03 VITALS
SYSTOLIC BLOOD PRESSURE: 136 MMHG | RESPIRATION RATE: 16 BRPM | WEIGHT: 144 LBS | DIASTOLIC BLOOD PRESSURE: 70 MMHG | BODY MASS INDEX: 27.19 KG/M2 | HEIGHT: 61 IN | HEART RATE: 88 BPM | OXYGEN SATURATION: 98 %

## 2025-02-03 DIAGNOSIS — I10 ESSENTIAL HYPERTENSION: ICD-10-CM

## 2025-02-03 DIAGNOSIS — E11.42 TYPE 2 DIABETES MELLITUS WITH DIABETIC POLYNEUROPATHY, WITH LONG-TERM CURRENT USE OF INSULIN: Primary | ICD-10-CM

## 2025-02-03 DIAGNOSIS — E78.2 MIXED HYPERLIPIDEMIA: ICD-10-CM

## 2025-02-03 DIAGNOSIS — Z79.4 TYPE 2 DIABETES MELLITUS WITH DIABETIC POLYNEUROPATHY, WITH LONG-TERM CURRENT USE OF INSULIN: Primary | ICD-10-CM

## 2025-02-03 PROCEDURE — 3052F HG A1C>EQUAL 8.0%<EQUAL 9.0%: CPT | Performed by: FAMILY MEDICINE

## 2025-02-03 PROCEDURE — 3075F SYST BP GE 130 - 139MM HG: CPT | Performed by: FAMILY MEDICINE

## 2025-02-03 PROCEDURE — 1126F AMNT PAIN NOTED NONE PRSNT: CPT | Performed by: FAMILY MEDICINE

## 2025-02-03 PROCEDURE — 1160F RVW MEDS BY RX/DR IN RCRD: CPT | Performed by: FAMILY MEDICINE

## 2025-02-03 PROCEDURE — 1159F MED LIST DOCD IN RCRD: CPT | Performed by: FAMILY MEDICINE

## 2025-02-03 PROCEDURE — 3078F DIAST BP <80 MM HG: CPT | Performed by: FAMILY MEDICINE

## 2025-02-03 PROCEDURE — G2211 COMPLEX E/M VISIT ADD ON: HCPCS | Performed by: FAMILY MEDICINE

## 2025-02-03 PROCEDURE — 99214 OFFICE O/P EST MOD 30 MIN: CPT | Performed by: FAMILY MEDICINE

## 2025-02-03 NOTE — PROGRESS NOTES
"Chief Complaint  Diabetes (F/u)    Subjective        Janna Flowers presents to [facility]  History of Present Illness  The patient is a 74-year-old female who presents for a 1-month follow-up on her diabetes.    She reports a decrease in her hemoglobin A1c levels from 8.3 to 8, which she attributes to an increase in her insulin dosage from 25 to 35 units prior to her blood work. She is currently on a regimen of 40 units of Tresiba. She experiences occasional hypoglycemic episodes, typically in the morning, with blood glucose levels ranging from 233 to 250 at bedtime and dropping to around 130 upon waking. She manages these episodes by consuming an apple. She also notes that her blood glucose levels tend to rise slightly after consuming 2 ounces of orange juice but subsequently normalize. She has observed that her blood glucose levels can spike to over 300 after eating but will return to normal within 2 to 3 hours. She has been under significant stress for the past 6 months due to family circumstances. She has been on metformin for her diabetes management. She has scheduled appointments for a mammogram and DEXA scan.    She is taking iron supplements at night, 2 hours after eating.    SOCIAL HISTORY  She has not been working for the past 3 years.    MEDICATIONS  Current: Tresiba, metformin, pravastatin    IMMUNIZATIONS  She has had both of her shots.       Objective   Vital Signs:  /70 (BP Location: Right arm, Patient Position: Sitting, Cuff Size: Adult)   Pulse 88   Resp 16   Ht 154.9 cm (61\")   Wt 65.3 kg (144 lb)   SpO2 98%   BMI 27.21 kg/m²   Estimated body mass index is 27.21 kg/m² as calculated from the following:    Height as of this encounter: 154.9 cm (61\").    Weight as of this encounter: 65.3 kg (144 lb).             Physical Exam  Constitutional:       Appearance: Normal appearance. She is well-developed and normal weight.   HENT:      Head: Normocephalic " and atraumatic.      Right Ear: Tympanic membrane, ear canal and external ear normal.      Left Ear: Tympanic membrane, ear canal and external ear normal.      Nose: Nose normal.      Mouth/Throat:      Mouth: Mucous membranes are moist.      Pharynx: Oropharynx is clear. No oropharyngeal exudate.   Eyes:      Extraocular Movements: Extraocular movements intact.      Conjunctiva/sclera: Conjunctivae normal.      Pupils: Pupils are equal, round, and reactive to light.   Cardiovascular:      Rate and Rhythm: Normal rate and regular rhythm.      Pulses: Normal pulses.      Heart sounds: Normal heart sounds.   Pulmonary:      Effort: Pulmonary effort is normal.      Breath sounds: Normal breath sounds.   Abdominal:      General: Bowel sounds are normal.      Palpations: Abdomen is soft.   Musculoskeletal:         General: Normal range of motion.      Cervical back: Normal range of motion and neck supple.   Skin:     General: Skin is warm and dry.   Neurological:      General: No focal deficit present.      Mental Status: She is alert and oriented to person, place, and time. Mental status is at baseline.   Psychiatric:         Mood and Affect: Mood normal.         Behavior: Behavior normal.         Thought Content: Thought content normal.         Judgment: Judgment normal.      Result Review :          Results  Laboratory Studies  Hemoglobin A1c was 8. Ferritin was normal. Iron profile showed good iron level, transferrin and TIBC were good, iron saturation was a little bit low. Retic count was good. Cholesterol panel was good. TSH and T4 were normal. Comprehensive panel was good. Blood sugar was 147. Kidney function and liver function tests were good. Protein, albumin were good. BUN and creatinine were normal. GFR is 82.                Assessment & Plan  1. Type 2 diabetes with diabetic polyneuropathy.  She is insulin-dependent diabetic. She recently had some blood work done that showed excellent numbers, both  cholesterol and kidney function and liver function test. Her A1c was still 8% but on her continuous glucose monitor. Her 3-month average is more like a 7.5%. I am not sure that the lab results were accurate from the hospital and I tend to believe that her reading on the continuous monitor is actually more accurate. She will continue her current regimen of 40 units of Tresiba and Metformin. She is staying active and following a low-carb diet. Her condition will be monitored over the next 3 months.    2. Essential hypertension.  Her blood pressure today was 136/70, and her home readings are excellent. She will continue her current treatment.    3. Mixed hyperlipidemia.  Her lipid panel today was excellent. She will continue her current treatment, which includes pravastatin 20 mg daily and a low-carb diet. Her condition will be rechecked in about 3 months.    Follow-up  The patient will follow up in 3 months.            Follow Up     Return in about 3 months (around 5/3/2025), or if symptoms worsen or fail to improve, for Recheck.  Patient was given instructions and counseling regarding her condition or for health maintenance advice. Please see specific information pulled into the AVS if appropriate.     Patient or patient representative verbalized consent for the use of Ambient Listening during the visit with  Donnie Ng MD for chart documentation. 2/3/2025  11:55 EST

## 2025-02-07 ENCOUNTER — TELEPHONE (OUTPATIENT)
Dept: FAMILY MEDICINE CLINIC | Facility: CLINIC | Age: 75
End: 2025-02-07

## 2025-02-07 RX ORDER — INSULIN DEGLUDEC 100 U/ML
35 INJECTION, SOLUTION SUBCUTANEOUS DAILY
Qty: 5 ML | Refills: 6 | Status: SHIPPED | OUTPATIENT
Start: 2025-02-07

## 2025-02-07 NOTE — TELEPHONE ENCOUNTER
Caller: Janna Flowers    Relationship: Self    Best call back number: [phone]    What medication are you requesting:   DEGLUDEC PEN GENERIC  --WITH DME DX     Have you had these symptoms before:    [] Yes  [x] No    Have you been treated for these symptoms before:   [] Yes  [x] No    If a prescription is needed, what is your preferred pharmacy and phone number: [facility] [id number] - [place], [place] - [address] - [phone] [facility] [phone] FX     Additional notes:  PLEASE CALL TO CONFIRM OR DISCUSS.     PATIENTS INSURANCE WILL NO LONGER HAVE :   (Tresiba FlexTouch)  ON ITS FORMULARY.

## 2025-02-13 DIAGNOSIS — Z79.4 TYPE 2 DIABETES MELLITUS WITH DIABETIC POLYNEUROPATHY, WITH LONG-TERM CURRENT USE OF INSULIN: ICD-10-CM

## 2025-02-13 DIAGNOSIS — E11.42 TYPE 2 DIABETES MELLITUS WITH DIABETIC POLYNEUROPATHY, WITH LONG-TERM CURRENT USE OF INSULIN: ICD-10-CM

## 2025-03-10 RX ORDER — ACYCLOVIR 400 MG/1
1 TABLET ORAL
Qty: 9 EACH | Refills: 3 | Status: SHIPPED | OUTPATIENT
Start: 2025-03-10

## 2025-03-10 NOTE — TELEPHONE ENCOUNTER
Caller: Janna Flowers    Relationship: Self    Best call back number: [phone]    Requested Prescriptions:   Requested Prescriptions     Pending Prescriptions Disp Refills    Continuous Glucose Sensor (Dexcom G7 Sensor) misc 9 each 3     Sig: Use 1 Device Every 10 (Ten) Days.        Pharmacy where request should be sent:    [facility]  [phone]  Last office visit with prescribing clinician: 2/3/2025   Last telemedicine visit with prescribing clinician: Visit date not found   Next office visit with prescribing clinician: Visit date not found     Additional details provided by patient: PATIENT STATES THAT IT IS TO THE SAME EQUIPMENT SUPPLY AS BEFORE    Does the patient have less than a 3 day supply:  [] Yes  [x] No        Edgar Shepherd Rep   03/10/25 09:14 EDT

## 2025-03-10 NOTE — TELEPHONE ENCOUNTER
Caller: Janna Flowers    Relationship: Self    Best call back number:     Janna Flowers (Self) [phone] (Home)     What was the call regarding: PATIENT IS STATING THAT THIS DEXCOM NEEDS TO GO TO   [facility]  [phone]      Is it okay if the provider responds through MyChart: CAN YOU CALL AND ADVISE

## 2025-03-11 ENCOUNTER — TELEPHONE (OUTPATIENT)
Dept: FAMILY MEDICINE CLINIC | Facility: CLINIC | Age: 75
End: 2025-03-11
Payer: MEDICARE

## 2025-03-11 NOTE — TELEPHONE ENCOUNTER
Caller: Janna Flowers    Relationship to patient: Self    Best call back number:   Telephone Information:   Mobile [phone]       Patient is needing: PATIENT STATED THAT HER Continuous Glucose Sensor (Dexcom G7 Sensor) misc  WAS SENT TO [facility] BUT THAT IS THE INCORRECT PHARMACY. PATIENT IS NEEDING THE PRESCRIPTION SENT TO [facility] AS SOON AS POSSIBLE. PATIENT PUT ON HER SENSOR ON 3/8/25, SO IT WILL NEED TO BE CHANGED 3/18/25. PLEASE ADVISE.    PREFERRED PHARMACY:HUB COULD NOT FIND PHARMACY IN SEARCH    [facility]   ADDRESS:[facility], [place] [id number]  PHONE NUMBER:[phone]

## 2025-03-11 NOTE — TELEPHONE ENCOUNTER
This is a form that [facility] faxes to us, PMJ signs and we fax back.  These usually does not come to pharmacy room.

## 2025-03-12 RX ORDER — INSULIN DEGLUDEC 100 U/ML
40 INJECTION, SOLUTION SUBCUTANEOUS DAILY
Qty: 9 ML | Refills: 2 | Status: SHIPPED | OUTPATIENT
Start: 2025-03-12 | End: 2025-03-13
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
## 2025-03-12 NOTE — TELEPHONE ENCOUNTER
Gave message to patient at 10:21am. She is worried she might not get the sensors in the mail before she needs to put one on. I told her to call [facility] tomorrow and see if there is any other option to ship them than the post office. She voiced understanding.

## 2025-03-12 NOTE — TELEPHONE ENCOUNTER
Caller: Janna Flowers    Relationship to patient: Self    Best call back number: [phone]    Patient is needing: PATIENT CALLED TO MAKE SURE PHARMACY REQUESTED THIS MEDICATION.

## 2025-03-13 ENCOUNTER — TELEPHONE (OUTPATIENT)
Dept: FAMILY MEDICINE CLINIC | Facility: CLINIC | Age: 75
End: 2025-03-13

## 2025-03-13 NOTE — TELEPHONE ENCOUNTER
Caller: Janna Flowers    Relationship: Self    Best call back number:[phone]     What was the call regarding: PATIENT IS CALLING TO SEE IF SOMETHING CAN BE CALLED IN ASAP

## 2025-03-13 NOTE — TELEPHONE ENCOUNTER
Caller: Janna Flowers    Relationship: Self    Best call back number: [phone]    What medication are you requesting: SOMETHING TO REPLACE THE GENERIC FOR TRESEBA    Have you had these symptoms before:    [x] Yes  [] No    Have you been treated for these symptoms before:   [x] Yes  [] No    If a prescription is needed, what is your preferred pharmacy and phone number: [facility] [id number] - [place], [place] - [address] - [phone] [facility] [phone]      Additional notes: PATIENT STATED [facility] CALLED AND STATED THAT THE  IS NO LONGER MAKING THIS GENERIC MEDICATION.    PATIENTS INSURANCE DOES NOT COVER THE BRAND NAME.    PATIENT IS QUESTIONING WHAT SHE IS SUPPOSED TO DO ABOUT HER MEDICATION IF SHE IS NOT ABLE TO GET IT DUE TO  NOT MAKING IT ANY MORE.    PATIENT IS ON LAST PEN PATIENT HAS LESS THAN THREE DAYS LEFT.

## 2025-03-14 ENCOUNTER — TELEPHONE (OUTPATIENT)
Dept: FAMILY MEDICINE CLINIC | Facility: CLINIC | Age: 75
End: 2025-03-14
Payer: MEDICARE

## 2025-03-14 NOTE — TELEPHONE ENCOUNTER
Please let her know that we are working as hard and fast as we can. Dr. Ng has SEVERAL notes he is working on. We will get this back to them as soon as we can.

## 2025-03-14 NOTE — TELEPHONE ENCOUNTER
Gave message to patient at 2:19pm. She voiced understanding and said she would not bother us any more.

## 2025-03-14 NOTE — TELEPHONE ENCOUNTER
Patient called because she has not received her CGM Sensors yet from [facility]'s because they have not received everything from us that they need. I told patient that you faxed the form and past 6 months office notes to them on 03/12/2025 and we got confirmation that it went thru. She asked if we have answered the fax they sent this morning. I told her I would have to check with you. Patient said this is very important because she has to put a new sensor on on 03/18/2025. She wants a call back with the status of todays fax.

## 2025-03-19 ENCOUNTER — TELEPHONE (OUTPATIENT)
Dept: FAMILY MEDICINE CLINIC | Facility: CLINIC | Age: 75
End: 2025-03-19
Payer: MEDICARE

## 2025-04-04 ENCOUNTER — TELEPHONE (OUTPATIENT)
Dept: FAMILY MEDICINE CLINIC | Facility: CLINIC | Age: 75
End: 2025-04-04
Payer: MEDICARE

## 2025-04-04 ENCOUNTER — TELEPHONE (OUTPATIENT)
Dept: FAMILY MEDICINE CLINIC | Facility: CLINIC | Age: 75
End: 2025-04-04

## 2025-04-04 DIAGNOSIS — Z79.4 TYPE 2 DIABETES MELLITUS WITH DIABETIC POLYNEUROPATHY, WITH LONG-TERM CURRENT USE OF INSULIN: ICD-10-CM

## 2025-04-04 DIAGNOSIS — E11.42 TYPE 2 DIABETES MELLITUS WITH DIABETIC POLYNEUROPATHY, WITH LONG-TERM CURRENT USE OF INSULIN: ICD-10-CM

## 2025-04-04 RX ORDER — INSULIN DEGLUDEC 100 U/ML
30 INJECTION, SOLUTION SUBCUTANEOUS DAILY
Qty: 9 ML | Refills: 3 | OUTPATIENT
Start: 2025-04-04

## 2025-04-04 RX ORDER — INSULIN DEGLUDEC 100 U/ML
INJECTION, SOLUTION SUBCUTANEOUS
Qty: 15 ML | OUTPATIENT
Start: 2025-04-04

## 2025-04-04 NOTE — TELEPHONE ENCOUNTER
Caller: Janna Flowers    Relationship: Self    Best call back number: [phone]     Which medication are you concerned about: INSULIN DEGLUDEC    Who prescribed you this medication: DR WOLFE        What are your concerns: ORIGINAL PHARMACY IS OUT OF THIS MEDICATION. IF IT CAN BE SENT TO [facility] [id number] [place] [id number] [facility] [phone] [facility] [phone] FX   THEY HAVE SEVERAL IN STOCK. SHE IS NEEDING IT FILLED SOON BECAUSE SHE HAS ONE PEN LEFT. SHE IS ASKING FOR A BOX OF FIVE PENS. PLEASE NOTIFY PATIENT WHEN MEDICATION SENT TO PHARMACY  PATIENT WOULD ALSO LIKE TO SPEAK WITH DR WOLFE ABOUT INCREASING HER DOSE BECAUSE SHE FEELS LIKE THIS IS NOT WORKING AS WELL AS PREVIOUS MEDICATION

## 2025-04-08 ENCOUNTER — TELEPHONE (OUTPATIENT)
Dept: FAMILY MEDICINE CLINIC | Facility: CLINIC | Age: 75
End: 2025-04-08
Payer: MEDICARE

## 2025-04-08 NOTE — TELEPHONE ENCOUNTER
Spoke with patient and scheduled an appt with Dr Ng on 05/08/2025 at 11am (45 minutes).    Zulay - make sure I did the visit type modifier's correct please. First time for me!

## 2025-04-24 ENCOUNTER — TELEPHONE (OUTPATIENT)
Dept: FAMILY MEDICINE CLINIC | Facility: CLINIC | Age: 75
End: 2025-04-24

## 2025-04-24 DIAGNOSIS — E11.42 TYPE 2 DIABETES MELLITUS WITH DIABETIC POLYNEUROPATHY, WITH LONG-TERM CURRENT USE OF INSULIN: Primary | ICD-10-CM

## 2025-04-24 DIAGNOSIS — Z79.4 TYPE 2 DIABETES MELLITUS WITH DIABETIC POLYNEUROPATHY, WITH LONG-TERM CURRENT USE OF INSULIN: Primary | ICD-10-CM

## 2025-04-24 NOTE — TELEPHONE ENCOUNTER
Gave message to patient at 10:26am. Patient states she has 5 iron pills left and she will not be refilling for another 90 pills.     Patient already has a lab appt on 05/05/2025 but there are no orders in. Patient guesses you need to check her iron at that time with her A1C.

## 2025-04-24 NOTE — TELEPHONE ENCOUNTER
Caller: Janna Flowers    Relationship: Self    Best call back number: [phone]    Caller requesting test results:     What test was performed: LABS    When was the test performed:     Where was the test performed:     Additional notes: PATIENT IS REQUESTING A CALL BACK TO KNOW IF HER IRON WAS HIGH IN HER MOST RECENT LAB WORK. SHE HAS BEEN FEELING TIRED AND THINKS SHE MIGHT HAVE TO MUCH IRON.

## 2025-04-30 RX ORDER — PRAVASTATIN SODIUM 20 MG
20 TABLET ORAL DAILY
Qty: 90 TABLET | Refills: 1 | Status: SHIPPED | OUTPATIENT
Start: 2025-04-30

## 2025-05-05 ENCOUNTER — LAB (OUTPATIENT)
Dept: FAMILY MEDICINE CLINIC | Facility: CLINIC | Age: 75
End: 2025-05-05
Payer: MEDICARE

## 2025-05-05 DIAGNOSIS — E11.42 TYPE 2 DIABETES MELLITUS WITH DIABETIC POLYNEUROPATHY, WITH LONG-TERM CURRENT USE OF INSULIN: ICD-10-CM

## 2025-05-05 DIAGNOSIS — Z79.4 TYPE 2 DIABETES MELLITUS WITH DIABETIC POLYNEUROPATHY, WITH LONG-TERM CURRENT USE OF INSULIN: ICD-10-CM

## 2025-05-05 LAB
ALBUMIN UR-MCNC: <1.2 MG/DL
CREAT UR-MCNC: 60.2 MG/DL
FERRITIN SERPL-MCNC: 43.4 NG/ML (ref 13–150)
HBA1C MFR BLD: 7.8 % (ref 4.8–5.6)
IRON 24H UR-MRATE: 45 MCG/DL (ref 37–145)
IRON SATN MFR SERPL: 12 % (ref 20–50)
MICROALBUMIN/CREAT UR: NORMAL MG/G{CREAT}
RETICS # AUTO: 0.07 10*6/MM3 (ref 0.02–0.13)
RETICS/RBC NFR AUTO: 1.63 % (ref 0.7–1.9)
TIBC SERPL-MCNC: 365 MCG/DL (ref 298–536)
TRANSFERRIN SERPL-MCNC: 245 MG/DL (ref 200–360)

## 2025-05-05 PROCEDURE — 84466 ASSAY OF TRANSFERRIN: CPT | Performed by: FAMILY MEDICINE

## 2025-05-05 PROCEDURE — 36415 COLL VENOUS BLD VENIPUNCTURE: CPT

## 2025-05-05 PROCEDURE — 85045 AUTOMATED RETICULOCYTE COUNT: CPT | Performed by: FAMILY MEDICINE

## 2025-05-05 PROCEDURE — 82570 ASSAY OF URINE CREATININE: CPT | Performed by: FAMILY MEDICINE

## 2025-05-05 PROCEDURE — 82728 ASSAY OF FERRITIN: CPT | Performed by: FAMILY MEDICINE

## 2025-05-05 PROCEDURE — 83036 HEMOGLOBIN GLYCOSYLATED A1C: CPT | Performed by: FAMILY MEDICINE

## 2025-05-05 PROCEDURE — 83540 ASSAY OF IRON: CPT | Performed by: FAMILY MEDICINE

## 2025-05-05 PROCEDURE — 82043 UR ALBUMIN QUANTITATIVE: CPT | Performed by: FAMILY MEDICINE

## 2025-05-06 RX ORDER — FERROUS SULFATE 325(65) MG
325 TABLET ORAL
Qty: 90 TABLET | Refills: 3 | Status: SHIPPED | OUTPATIENT
Start: 2025-05-06

## 2025-05-07 ENCOUNTER — TELEPHONE (OUTPATIENT)
Dept: FAMILY MEDICINE CLINIC | Facility: CLINIC | Age: 75
End: 2025-05-07
Payer: MEDICARE

## 2025-05-07 NOTE — TELEPHONE ENCOUNTER
Caller: Janna Flowers    Relationship: Self    Best call back number: [phone]       Who are you requesting to speak with (clinical staff, provider,  specific staff member):  PATIENT IS WANTING RSV VACCINE SINCE TURNING 75, DOES HAVE APPT 5-8-25. PLEASE DISCUSS AT APPT, NO NEED TO CALL BACK.

## 2025-05-08 ENCOUNTER — OFFICE VISIT (OUTPATIENT)
Dept: FAMILY MEDICINE CLINIC | Facility: CLINIC | Age: 75
End: 2025-05-08
Payer: MEDICARE

## 2025-05-08 VITALS
RESPIRATION RATE: 16 BRPM | HEIGHT: 61 IN | BODY MASS INDEX: 27.56 KG/M2 | OXYGEN SATURATION: 99 % | DIASTOLIC BLOOD PRESSURE: 74 MMHG | SYSTOLIC BLOOD PRESSURE: 124 MMHG | HEART RATE: 93 BPM | WEIGHT: 146 LBS

## 2025-05-08 DIAGNOSIS — I10 ESSENTIAL HYPERTENSION: ICD-10-CM

## 2025-05-08 DIAGNOSIS — Z79.4 TYPE 2 DIABETES MELLITUS WITH DIABETIC POLYNEUROPATHY, WITH LONG-TERM CURRENT USE OF INSULIN: Primary | ICD-10-CM

## 2025-05-08 DIAGNOSIS — E11.42 TYPE 2 DIABETES MELLITUS WITH DIABETIC POLYNEUROPATHY, WITH LONG-TERM CURRENT USE OF INSULIN: Primary | ICD-10-CM

## 2025-05-08 NOTE — PROGRESS NOTES
"Chief Complaint  Diabetes    Subjective        Janna Flowers presents to [facility]  History of Present Illness  The patient is a 75-year-old female who presents for evaluation of type 2 diabetes with diabetic polyneuropathy, essential hypertension, and muscle spasm.    She has been experiencing intermittent pain under her rib cage for several years, which she describes as muscular in nature. The pain is characterized by a sensation of muscle tightening and soreness, particularly when pressure is applied. She reports that the pain is alleviated upon standing and stretching. She engages in daily activities such as bending and lifting, and also spends time in the sun near her swimming pool.    She has been unable to obtain her generic Tresiba medication from her usual pharmacy, [facility], due to unavailability. She was previously prescribed 5 pens of generic Lantus, but was only able to procure 3 pens. She has been informed that her prescription is ready at another location, which she plans to visit. She has one pen of generic Tresiba remaining, which she administers once daily. She has been using sensors to monitor her blood glucose levels, which have not decreased as significantly as she had anticipated. Her goal is to achieve an A1c level below 7. She has observed fluctuations in her blood glucose levels, with readings of 165 at bedtime and 6.2 at 4:00 AM. She reports a reading of 121 last night, after which she consumed an apple and peanut butter, resulting in a decrease to 111. She has been consuming food when her blood glucose levels decrease. She is currently on a regimen of 40 units of insulin.    She is scheduled to receive her RSV vaccine at [facility]. She is also due for a mammogram and DEXA scan on the same day.    She reports no respiratory distress.       Objective   Vital Signs:  /74   Pulse 93   Resp 16   Ht 154.9 cm (61\")   Wt 66.2 kg (146 lb)   SpO2 99%  " " BMI 27.59 kg/m²   Estimated body mass index is 27.59 kg/m² as calculated from the following:    Height as of this encounter: 154.9 cm (61\").    Weight as of this encounter: 66.2 kg (146 lb).             Physical Exam  Constitutional:       Appearance: Normal appearance. She is well-developed and normal weight.   HENT:      Head: Normocephalic and atraumatic.      Right Ear: Tympanic membrane, ear canal and external ear normal.      Left Ear: Tympanic membrane, ear canal and external ear normal.      Nose: Nose normal.      Mouth/Throat:      Mouth: Mucous membranes are moist.      Pharynx: Oropharynx is clear. No oropharyngeal exudate.   Eyes:      Extraocular Movements: Extraocular movements intact.      Conjunctiva/sclera: Conjunctivae normal.      Pupils: Pupils are equal, round, and reactive to light.   Cardiovascular:      Rate and Rhythm: Normal rate and regular rhythm.      Pulses: Normal pulses.      Heart sounds: Normal heart sounds.   Pulmonary:      Effort: Pulmonary effort is normal.      Breath sounds: Normal breath sounds.   Abdominal:      General: Bowel sounds are normal.      Palpations: Abdomen is soft.   Musculoskeletal:         General: Normal range of motion.      Cervical back: Normal range of motion and neck supple.   Skin:     General: Skin is warm and dry.   Neurological:      General: No focal deficit present.      Mental Status: She is alert and oriented to person, place, and time. Mental status is at baseline.   Psychiatric:         Mood and Affect: Mood normal.         Behavior: Behavior normal.         Thought Content: Thought content normal.         Judgment: Judgment normal.        Result Review :          Results  Labs   - A1c: 7.8%                Assessment & Plan  1. Type 2 diabetes with diabetic polyneuropathy.  Her A1c levels have been slightly elevated over the last couple of years, necessitating adjustments in her insulin regimen. She is currently on Lantus insulin, 40 units " daily, and this dose appears to be effective.  The current dose of Lantus insulin will be maintained at 40 units daily.  A repeat A1c test is scheduled for approximately 4 months from now.  A1c tests will be conducted every 4 months until stable and accurate control is achieved, ideally with an A1c level between 6.5% and 7%. At that point, the frequency of testing may be reduced to maintain control.    2. Essential hypertension.  She is under treatment for hypertension.  Her blood pressure reading today was 124/74.  She will continue her current medication regimen.    3. Muscle spasm.  She reports muscle spasm and soreness under her rib cage, which has been ongoing for a couple of years.  The pain is likely due to an injury or strain of the abdominal muscles attached to the rib cage.  She is advised to continue stretching exercises to alleviate the muscle spasm.    4. Health maintenance.  She is advised to get her RSV shot at the pharmacy, as it is not available at the clinic.  She is also scheduled for a mammogram and DEXA scan on the same day.    Follow-up  The patient will follow up in 4 months.            Follow Up     Return in about 4 months (around 9/8/2025), or if symptoms worsen or fail to improve, for Recheck.  Patient was given instructions and counseling regarding her condition or for health maintenance advice. Please see specific information pulled into the AVS if appropriate.     Patient or patient representative verbalized consent for the use of Ambient Listening during the visit with  Donnie Ng MD for chart documentation. 5/8/2025  11:43 EDT  Answers submitted by the patient for this visit:  Diabetes Questionnaire (Submitted on 5/1/2025)  Chief Complaint: Diabetes problem  Diabetes type: type 2  MedicAlert ID: Yes  Disease duration: 27 Years  Treatment compliance: all of the time  Symptom course: stable  Home blood tests: 3-4 x per day  High score: >200  Below 70: every several  days  blurred vision: No  foot paresthesias: Yes  foot ulcerations: No  polydipsia: No  polyuria: No  weight loss: No  blackouts: No  hospitalization: No  nocturnal hypoglycemia: Yes  required assistance: No  required glucagon: No  confusion: No  headaches: No  speech difficulty: No  sweats: Yes  tremors: Yes  Dose schedule: pre-lunch  Given by: patient  Injection sites: abdominal wall  Current diet: generally healthy  Meal planning: carbohydrate counting  Exercise: daily  Eye exam current: Yes  Sees podiatrist: Yes  Additional information: Using Dexacom G7 sensor

## 2025-05-14 DIAGNOSIS — Z79.4 TYPE 2 DIABETES MELLITUS WITH DIABETIC POLYNEUROPATHY, WITH LONG-TERM CURRENT USE OF INSULIN: ICD-10-CM

## 2025-05-14 DIAGNOSIS — E11.42 TYPE 2 DIABETES MELLITUS WITH DIABETIC POLYNEUROPATHY, WITH LONG-TERM CURRENT USE OF INSULIN: ICD-10-CM

## 2025-05-20 DIAGNOSIS — Z00.00 MEDICARE ANNUAL WELLNESS VISIT, SUBSEQUENT: ICD-10-CM

## 2025-05-20 DIAGNOSIS — E11.42 TYPE 2 DIABETES MELLITUS WITH DIABETIC POLYNEUROPATHY, WITH LONG-TERM CURRENT USE OF INSULIN: ICD-10-CM

## 2025-05-20 DIAGNOSIS — Z79.4 TYPE 2 DIABETES MELLITUS WITH DIABETIC POLYNEUROPATHY, WITH LONG-TERM CURRENT USE OF INSULIN: ICD-10-CM

## 2025-05-20 DIAGNOSIS — E78.2 MIXED HYPERLIPIDEMIA: ICD-10-CM

## 2025-05-20 DIAGNOSIS — M19.90 OSTEOARTHRITIS, UNSPECIFIED OSTEOARTHRITIS TYPE, UNSPECIFIED SITE: ICD-10-CM

## 2025-05-20 DIAGNOSIS — I10 ESSENTIAL HYPERTENSION: ICD-10-CM

## 2025-05-20 DIAGNOSIS — G63 POLYNEUROPATHY ASSOCIATED WITH UNDERLYING DISEASE: ICD-10-CM

## 2025-05-20 DIAGNOSIS — Z12.31 ENCOUNTER FOR SCREENING MAMMOGRAM FOR MALIGNANT NEOPLASM OF BREAST: ICD-10-CM

## 2025-07-15 RX ORDER — INSULIN GLARGINE-YFGN 100 [IU]/ML
INJECTION, SOLUTION SUBCUTANEOUS
Qty: 15 ML | Refills: 1 | Status: SHIPPED | OUTPATIENT
Start: 2025-07-15

## 2025-08-11 RX ORDER — PEN NEEDLE, DIABETIC 31 GX5/16"
NEEDLE, DISPOSABLE MISCELLANEOUS
Qty: 100 EACH | Refills: 1 | Status: SHIPPED | OUTPATIENT
Start: 2025-08-11

## 2025-08-24 DIAGNOSIS — Z79.4 TYPE 2 DIABETES MELLITUS WITH DIABETIC POLYNEUROPATHY, WITH LONG-TERM CURRENT USE OF INSULIN: ICD-10-CM

## 2025-08-24 DIAGNOSIS — E11.42 TYPE 2 DIABETES MELLITUS WITH DIABETIC POLYNEUROPATHY, WITH LONG-TERM CURRENT USE OF INSULIN: ICD-10-CM
